# Patient Record
Sex: MALE | NOT HISPANIC OR LATINO | Employment: OTHER | URBAN - METROPOLITAN AREA
[De-identification: names, ages, dates, MRNs, and addresses within clinical notes are randomized per-mention and may not be internally consistent; named-entity substitution may affect disease eponyms.]

---

## 2017-01-02 ENCOUNTER — OFFICE VISIT (OUTPATIENT)
Dept: FAMILY MEDICINE | Facility: CLINIC | Age: 57
End: 2017-01-02
Payer: COMMERCIAL

## 2017-01-02 VITALS
HEART RATE: 56 BPM | BODY MASS INDEX: 26.68 KG/M2 | DIASTOLIC BLOOD PRESSURE: 64 MMHG | SYSTOLIC BLOOD PRESSURE: 97 MMHG | WEIGHT: 170.4 LBS

## 2017-01-02 DIAGNOSIS — M10.071 ACUTE IDIOPATHIC GOUT OF RIGHT FOOT: Primary | ICD-10-CM

## 2017-01-02 LAB — URATE SERPL-MCNC: 4.6 MG/DL (ref 3.5–7.2)

## 2017-01-02 PROCEDURE — 84550 ASSAY OF BLOOD/URIC ACID: CPT | Performed by: INTERNAL MEDICINE

## 2017-01-02 PROCEDURE — 36415 COLL VENOUS BLD VENIPUNCTURE: CPT | Performed by: INTERNAL MEDICINE

## 2017-01-02 PROCEDURE — 99000 SPECIMEN HANDLING OFFICE-LAB: CPT | Performed by: INTERNAL MEDICINE

## 2017-01-02 PROCEDURE — 99214 OFFICE O/P EST MOD 30 MIN: CPT | Performed by: INTERNAL MEDICINE

## 2017-01-02 NOTE — NURSING NOTE
"Chief Complaint   Patient presents with     Arthritis     gout in big toe on right foot. Pain comes and goes but has been getting worse, ongoing for the last couple years. Recovery of stroke (7months) and is having a hard time completing physical therapy due to toe pain.       Initial BP 97/64 mmHg  Pulse 56  Wt 170 lb 6.4 oz (77.293 kg) Estimated body mass index is 26.68 kg/(m^2) as calculated from the following:    Height as of 11/8/16: 5' 7\" (1.702 m).    Weight as of this encounter: 170 lb 6.4 oz (77.293 kg).  BP completed using cuff size: large  Ladonna Schwsamanthaek CMA    "

## 2017-01-02 NOTE — PROGRESS NOTES
SUBJECTIVE:                                                    Sen Watkins is a 56 year old male who presents to clinic today for the following health issues:    Chief Complaint   Patient presents with     Arthritis     gout in big toe on right foot. Pain comes and goes, ongoing for the last couple years.    gout: he is not currently having an episode of gout. He thinks episodes are triggered by soda use.  History of gout x 4 years.  He cannot say how many episodes of gout he has per year but reports more than 3/year.  Episodes typically last 2-3 days.  He treats at home with rest, apple cider vinegar.  Doesn't use any meds b/c he is sensitive to meds.      Current Outpatient Prescriptions   Medication Sig Dispense Refill     atorvastatin (LIPITOR) 40 MG tablet Take 1 tablet (40 mg) by mouth daily 90 tablet 1     aspirin EC 81 MG EC tablet Take 1 tablet (81 mg) by mouth daily       L-ARGININE PO        VITAMIN K PO        SPIRULINA PO        Dacia Root (DACIA PO)        Ascorbic Acid (VITAMIN C PO) Take 250 mg by mouth       Selenium (SELENIMIN-200 PO)        Multiple Vitamins-Iron (STRESS B COMPLEX/IRON) TABS Take 1 tablet by mouth daily 100 tablet 3     DiphenhydrAMINE HCl (BENADRYL PO) Take 25 mg by mouth every 6 hours as needed for itching       albuterol (PROAIR HFA, PROVENTIL HFA, VENTOLIN HFA) 108 (90 BASE) MCG/ACT inhaler Inhale 2 puffs into the lungs every 4 hours as needed for shortness of breath / dyspnea. 3 Inhaler 3     Problem list, Medication list, Allergies, and Medical/Social/Surgical histories reviewed in EPIC and updated as appropriate.    ROS:  Constitutional, HEENT, cardiovascular, pulmonary, gi and gu systems are negative, except as otherwise noted.    OBJECTIVE:                                                    BP 97/64 mmHg  Pulse 56  Wt 170 lb 6.4 oz (77.293 kg)  Body mass index is 26.68 kg/(m^2).  GENERAL APPEARANCE: healthy, alert and no distress  MS: no erythema, redness,  pain, tophi at right great toe    Diagnostic Test Results:  No results found for this or any previous visit (from the past 24 hour(s)).     ASSESSMENT/PLAN:                                                        ICD-10-CM    1. Acute idiopathic gout of right foot M10.071 Uric acid     1. We will check uric acid level today  2. If the uric acid level is > 6, then we should start Allopurinol a gout preventative medication.  3. The most common side effect of allopurinol is a flare-up of gout, as the body gets used to the medication.  We typically use Naproxen 500 mg once daily x 1 month to prevent flare-up of gout. (will not use colchicine due to cost)  4. Repeat Uric Acid level in 2 months, and see your primary care provider to increase the allopurinol, if needed.        Michelle Cardenas, DO  [facility]

## 2017-01-02 NOTE — PATIENT INSTRUCTIONS
1. We will check uric acid level today  2. If the uric acid level is > 6, then we should start Allopurinol a gout preventative medication.  3. The most common side effect of allopurinol is a flare-up of gout, as the body gets used to the medication.  We typically use Naproxen 500 mg once daily x 1 month to prevent flare-up of gout.  4. Repeat Uric Acid level in 2 months, and see your primary care provider to increase the allopurinol, if needed.

## 2017-01-02 NOTE — MR AVS SNAPSHOT
After Visit Summary   1/2/2017    Sen Watkins    MRN: [id number]           Patient Information     Date Of Birth          1960        Visit Information        Provider Department      1/2/2017 10:40 AM Michelle Cardenas, DO [facility]        Today's Diagnoses     Acute idiopathic gout of right foot    -  1       Care Instructions    1. We will check uric acid level today  2. If the uric acid level is > 6, then we should start Allopurinol a gout preventative medication.  3. The most common side effect of allopurinol is a flare-up of gout, as the body gets used to the medication.  We typically use Naproxen 500 mg once daily x 1 month to prevent flare-up of gout.  4. Repeat Uric Acid level in 2 months, and see your primary care provider to increase the allopurinol, if needed.        Follow-ups after your visit        Your next 10 appointments already scheduled     Jan 04, 2017 12:30 PM   Treatment with Nelson Garcia PT   [facility] ([facility])    [address] [place] [address] [phone]            Jan 18, 2017 12:30 PM   Treatment with Nelson Garcia, PT   [facility] ([facility])    [address] [place] [id number] [phone]              Who to contact     If you have questions or need follow up information about today's clinic visit or your schedule please contact [facility] directly at [phone].  Normal or non-critical lab and imaging results will be communicated to you by MyChart, letter or phone within 4 business days after the clinic has received the results. If you do not hear from us within 7 days, please contact the clinic through MyChart or phone. If you have a critical or abnormal lab result, we will notify you by phone as soon as possible.  Submit refill requests through [employer] or call your pharmacy and they will forward the  "refill request to us. Please allow 3 business days for your refill to be completed.          Additional Information About Your Visit        [employer] Information     StowThat lets you send messages to your doctor, view your test results, renew your prescriptions, schedule appointments and more. To sign up, go to [email or web address] . Click on \"Log in\" on the left side of the screen, which will take you to the Welcome page. Then click on \"Sign up Now\" on the right side of the page.     You will be asked to enter the access code listed below, as well as some personal information. Please follow the directions to create your username and password.     Your access code is: [email or web address]  Expires: 2017  5:30 AM     Your access code will  in 90 days. If you need help or a new code, please call your [facility] or [phone].        Your Vitals Were     Pulse                   56            Blood Pressure from Last 3 Encounters:   17 97/64   16 110/72   16 122/86    Weight from Last 3 Encounters:   17 170 lb 6.4 oz (77.293 kg)   16 163 lb (73.936 kg)   16 166 lb 12.8 oz (75.66 kg)              We Performed the Following     Uric acid        Primary Care Provider Office Phone # Fax #    MYRA Craig [facility] [phone]       [address] [address]        Thank you!     Thank you for choosing [facility]  for your care. Our goal is always to provide you with excellent care. Hearing back from our patients is one way we can continue to improve our services. Please take a few minutes to complete the written survey that you may receive in the mail after your visit with us. Thank you!             Your Updated Medication List - Protect others around you: Learn how to safely use, store and throw away your medicines at www.disposemymeds.org.          This list is accurate as of: 17 10:53 AM.  Always use " your most recent med list.                   Brand Name Dispense Instructions for use    albuterol 108 (90 BASE) MCG/ACT Inhaler    PROAIR HFA/PROVENTIL HFA/VENTOLIN HFA    3 Inhaler    Inhale 2 puffs into the lungs every 4 hours as needed for shortness of breath / dyspnea.       aspirin 81 MG EC tablet      Take 1 tablet (81 mg) by mouth daily       atorvastatin 40 MG tablet    LIPITOR    90 tablet    Take 1 tablet (40 mg) by mouth daily       BENADRYL PO      Take 25 mg by mouth every 6 hours as needed for itching       L-ARGININE PO          DACIA PO          SELENIMIN-200 PO          SPIRULINA PO          STRESS B COMPLEX/IRON Tabs     100 tablet    Take 1 tablet by mouth daily       VITAMIN C PO      Take 250 mg by mouth       VITAMIN K PO

## 2017-01-04 ENCOUNTER — TELEPHONE (OUTPATIENT)
Dept: FAMILY MEDICINE | Facility: CLINIC | Age: 57
End: 2017-01-04

## 2017-01-04 ENCOUNTER — HOSPITAL ENCOUNTER (OUTPATIENT)
Dept: PHYSICAL THERAPY | Facility: CLINIC | Age: 57
Setting detail: THERAPIES SERIES
End: 2017-01-04
Attending: PHYSICAL MEDICINE & REHABILITATION
Payer: COMMERCIAL

## 2017-01-04 DIAGNOSIS — M1A.09X0 IDIOPATHIC CHRONIC GOUT OF MULTIPLE SITES WITHOUT TOPHUS: Primary | ICD-10-CM

## 2017-01-04 DIAGNOSIS — I63.81 THALAMIC INFARCT, ACUTE (H): ICD-10-CM

## 2017-01-04 DIAGNOSIS — I63.9 OCCIPITAL STROKE (H): Primary | ICD-10-CM

## 2017-01-04 PROCEDURE — 97110 THERAPEUTIC EXERCISES: CPT | Mod: GP

## 2017-01-04 PROCEDURE — 40000718 ZZHC STATISTIC PT DEPARTMENT ORTHO VISIT

## 2017-01-04 RX ORDER — ATORVASTATIN CALCIUM 40 MG/1
40 TABLET, FILM COATED ORAL DAILY
Qty: 90 TABLET | Refills: 1 | Status: SHIPPED | OUTPATIENT
Start: 2017-01-04 | End: 2017-07-10

## 2017-01-04 NOTE — TELEPHONE ENCOUNTER
Patient states he has been researching on the internet and found that he could have low uric acid and still have gout flares. He is requesting a referral for rheumatologist and is also is requesting to know if there is a urine test that would help diagnosis gout as well (calcium build up). I tried to explain what he is describing is likely the make up of his kidney stones but he states he knows that his gout is caused by his kidney stones.     1. He needs referral for his appt on 1/17- this is pended in   2. He is requesting a standing order for a urine test to go with the uric acid order? I am not sure what this would    Evelyn Barrera RN

## 2017-01-04 NOTE — TELEPHONE ENCOUNTER
It is fine to see Dr. Dejesus if he wishes.  As we discussed at the time of visit, there is questionable utility in checking uric acid when he was just getting over an episode.  I agree that checking uric acid at another date can be helpful.  We can certainly check UA, but not sure what else this would add.    If wants to start the preventative med, he doesn't have to see Dr. Dejesus for that, I can certainly start it.  We did discuss this in the visit.

## 2017-01-04 NOTE — TELEPHONE ENCOUNTER
Reason for Call: Request for an order or referral:    Order or referral being requested: referral    Date needed: as soon as possible    Has the patient been seen by the PCP for this problem? YES    Additional comments: pt calling in stating he will be seeing Dr Dejesus in [place] in two weeks. He needs a referral. He was also wondering if there is another flare up if there is a urine test he can get done.    Phone number Patient can be reached at:  Home number on file [phone] (home)    Best Time:  any    Can we leave a detailed message on this number?  YES    Call taken on 1/4/2017 at 2:27 PM by Gayathri Landers

## 2017-01-04 NOTE — TELEPHONE ENCOUNTER
Atorvastatin     Last Written Prescription Date: 07/8/16  Last Fill Quantity: 90, # refills: 1  Last Office Visit with [facility], [facility] or [facility] prescribing provider: 1/2/17       CHOL      178   6/3/2016  HDL       64   6/3/2016  LDL      103   6/3/2016  TRIG       54   6/3/2016  CHOLHDLRATIO      3.0   4/25/2011

## 2017-01-05 RX ORDER — NAPROXEN 500 MG/1
500 TABLET ORAL DAILY
Qty: 30 TABLET | Refills: 0 | Status: SHIPPED | OUTPATIENT
Start: 2017-01-05 | End: 2017-02-06

## 2017-01-05 RX ORDER — ALLOPURINOL 100 MG/1
100 TABLET ORAL DAILY
Qty: 90 TABLET | Refills: 1 | Status: SHIPPED | OUTPATIENT
Start: 2017-01-05 | End: 2017-07-10

## 2017-01-05 NOTE — TELEPHONE ENCOUNTER
"S; see note below was seen recently and requested referral to rheum.     B:  I reached him and  Advised per Dr Cardenas's note below:    \"well, they told me that they wouldn't do the preventative medicine when they called me about the labs\"   (see result notes below)     Notes Recorded by Sierra Judd CMA on 1/3/2017 at 3:40 PM  Spoke with patient regarding results with provider's instructions.  ------  Notes Recorded by Michelle Cardenas DO on 1/2/2017 at 3:27 PM  Uric acid is very low, would not recommend starting Allopurinol at this time.  It could be reconsidered if he continues to have frequent flare up of gout.  He should be seen at the time of next episode of gout, to ensure it truly is gout and not something else.    \"I still have pain in my foot, and a lump, it is like a crystallization buildup on my foot. \"  Has had foot pain for a couple of years. \"I didn't have insurance to be seen, I had an xray in [facility] and they saw gout then, \"  \"I couldn't afford to take off work, and I still can't afford all this. \"  \"If she will order the prevention medicine I wouldn't need to see Dr Dejesus, but they told me when they called she wouldn't order it, \" (as per result notes on uric acid )     A; believes he has gout even though the uric was low, and is interested in preventative medication  R: note to Dr Cardenas, what to recommend?   Rowan Toney RNC      "

## 2017-01-05 NOTE — TELEPHONE ENCOUNTER
Ok to start allopurinol 100 mg once daily.  Start naproxen for gout flare prevention, as allopurinol increase risk of gout within first few weeks of starting.

## 2017-01-17 ENCOUNTER — OFFICE VISIT (OUTPATIENT)
Dept: RHEUMATOLOGY | Facility: CLINIC | Age: 57
End: 2017-01-17
Payer: COMMERCIAL

## 2017-01-17 VITALS
DIASTOLIC BLOOD PRESSURE: 76 MMHG | SYSTOLIC BLOOD PRESSURE: 117 MMHG | WEIGHT: 175 LBS | HEART RATE: 55 BPM | BODY MASS INDEX: 27.4 KG/M2

## 2017-01-17 DIAGNOSIS — M19.91 PRIMARY OSTEOARTHRITIS, UNSPECIFIED SITE: Primary | ICD-10-CM

## 2017-01-17 PROCEDURE — 99202 OFFICE O/P NEW SF 15 MIN: CPT | Performed by: INTERNAL MEDICINE

## 2017-01-17 NOTE — MR AVS SNAPSHOT
"              After Visit Summary   1/17/2017    Sen Watkins    MRN: [id number]           Patient Information     Date Of Birth          1960        Visit Information        Provider Department      1/17/2017 1:45 PM Nakul Dejesus MD [facility]        Today's Diagnoses     Primary osteoarthritis, unspecified site    -  1        Follow-ups after your visit        Your next 10 appointments already scheduled     Jan 18, 2017 12:30 PM   Treatment with Nelson Garcia PT   [facility] ([facility])    [address] [place] [id number] [phone]              Who to contact     If you have questions or need follow up information about today's clinic visit or your schedule please contact [facility] directly at [phone].  Normal or non-critical lab and imaging results will be communicated to you by MyChart, letter or phone within 4 business days after the clinic has received the results. If you do not hear from us within 7 days, please contact the clinic through MyChart or phone. If you have a critical or abnormal lab result, we will notify you by phone as soon as possible.  Submit refill requests through [employer] or call your pharmacy and they will forward the refill request to us. Please allow 3 business days for your refill to be completed.          Additional Information About Your Visit        MyChart Information     [employer] lets you send messages to your doctor, view your test results, renew your prescriptions, schedule appointments and more. To sign up, go to [email or web address] . Click on \"Log in\" on the left side of the screen, which will take you to the Welcome page. Then click on \"Sign up Now\" on the right side of the page.     You will be asked to enter the access code listed below, as well as some personal information. Please follow the directions to create your username and " password.     Your access code is: [email or web address]  Expires: 2017  5:30 AM     Your access code will  in 90 days. If you need help or a new code, please call your [place] clinic or [phone].        Care EveryWhere ID     This is your Care EveryWhere ID. This could be used by other organizations to access your [place] medical records  [id number]        Your Vitals Were     Pulse                   55            Blood Pressure from Last 3 Encounters:   17 117/76   17 97/64   16 110/72    Weight from Last 3 Encounters:   17 79.379 kg (175 lb)   17 77.293 kg (170 lb 6.4 oz)   16 73.936 kg (163 lb)              Today, you had the following     No orders found for display       Primary Care Provider Office Phone # Fax #    MYRA Craig [facility] [phone]       [place] [address] [place] [id number]        Thank you!     Thank you for choosing [facility]  for your care. Our goal is always to provide you with excellent care. Hearing back from our patients is one way we can continue to improve our services. Please take a few minutes to complete the written survey that you may receive in the mail after your visit with us. Thank you!             Your Updated Medication List - Protect others around you: Learn how to safely use, store and throw away your medicines at www.disposemymeds.org.          This list is accurate as of: 17  3:12 PM.  Always use your most recent med list.                   Brand Name Dispense Instructions for use    albuterol 108 (90 BASE) MCG/ACT Inhaler    PROAIR HFA/PROVENTIL HFA/VENTOLIN HFA    3 Inhaler    Inhale 2 puffs into the lungs every 4 hours as needed for shortness of breath / dyspnea.       allopurinol 100 MG tablet    ZYLOPRIM    90 tablet    Take 1 tablet (100 mg) by mouth daily       aspirin 81 MG EC tablet      Take 1 tablet (81 mg) by mouth daily       atorvastatin 40  MG tablet    LIPITOR    90 tablet    Take 1 tablet (40 mg) by mouth daily       BENADRYL PO      Take 25 mg by mouth every 6 hours as needed for itching       L-ARGININE PO          DACIA PO          naproxen 500 MG tablet    NAPROSYN    30 tablet    Take 1 tablet (500 mg) by mouth daily       SELENIMIN-200 PO          SPIRULINA PO          STRESS B COMPLEX/IRON Tabs     100 tablet    Take 1 tablet by mouth daily       VITAMIN C PO      Take 250 mg by mouth       VITAMIN K PO

## 2017-01-17 NOTE — PROGRESS NOTES
"Chief Complaint   Patient presents with     Consult     ref. Dr. Cardenas    Gout ?      Initial /76 mmHg  Pulse 55  Wt 79.379 kg (175 lb) Estimated body mass index is 27.4 kg/(m^2) as calculated from the following:    Height as of 11/8/16: 1.702 m (5' 7\").    Weight as of this encounter: 79.379 kg (175 lb).      Patient prefers to be contacted via at Home.   Okay to leave detailed message: Yes  Patient uses MyChart: No    Chelle RAMÍREZ LPN      "

## 2017-01-18 ENCOUNTER — HOSPITAL ENCOUNTER (OUTPATIENT)
Dept: PHYSICAL THERAPY | Facility: CLINIC | Age: 57
Setting detail: THERAPIES SERIES
End: 2017-01-18
Attending: PHYSICAL MEDICINE & REHABILITATION
Payer: COMMERCIAL

## 2017-01-18 PROCEDURE — 97110 THERAPEUTIC EXERCISES: CPT | Mod: GP

## 2017-01-18 PROCEDURE — 40000719 ZZHC STATISTIC PT DEPARTMENT NEURO VISIT

## 2017-01-18 NOTE — PROGRESS NOTES
CHIEF COMPLAINT:  Gout.      HISTORY OF PRESENT ILLNESS:  Sen Watkins is a 56-year-old male who presents for evaluation of what essentially is gout.  I am not exactly certain where the diagnosis first came from, the patient is fairly convinced that he has gout.  He is complaining of pain and swelling in the right first MTP joint that is really worse more with prolonged standing or when he attempts to roller skate and such activities.  He says that at times he can become quite swollen but does not appear the pain ever resolves completely.  Most importantly, he recently was given some naproxen which does seem to have helped significantly and uric acid level was checked and was found to be at 4.6.  The patient has had kidney stones, is not clear that they were ever actually urate related.        The patient has no other significant joint pain, swelling, or stiffness.  He does not have a rash or psoriasis.  He does not have a diagnosis of inflammatory bowel disease or chronic diarrhea.  He also does not have problems on his left foot.      PAST MEDICAL HISTORY:  Acute ischemic stroke, intermittent asthma.      MEDICATIONS:   1.  Lipitor 40 mg daily.   2.  Albuterol p.r.n.   3.  Aspirin 81 mg daily.   4.  Naproxen 500 mg daily.   5.  Multivitamin.      DRUG ALLERGIES:  Codeine.      SOCIAL HISTORY:  Former smoker, quit in 2012, does not drink.      FAMILY HISTORY:  Nobody else in the family with gout.      REVIEW OF SYSTEMS:  As noted above.      PHYSICAL EXAMINATION:   VITAL SIGNS:  Blood pressure 117/76, pulse 76, pulse 55, weight 175.     HEENT:  Head is normocephalic, atraumatic.  Sclerae are clear and moist.  Oropharynx without lesions.   NECK:  Supple, without lymphadenopathy.   MUSCULOSKELETAL:  Joint exam there is no obvious tophi in the upper and lower extremities.  There is no synovitis of the wrists, MCPs, or PIPs.  There is no synovitis of the elbows, shoulders, knees or ankles.  What he has at the right  first MTP joint is a bunion and he is developing hallux rigidus, this is not a tophi as far as I can tell, nor does he appear to have anything that would suggest he has active gout.      IMPRESSION:  Osteoarthritis of the right first MTP joint.      RECOMMENDATIONS:     1.  At this point, I discussed that really what I am seeing is osteoarthritis, is not convincing to me that this is gout, especially given his low uric acid level, although if it was drawn at the time of an attack it could be artificially suppressed.  Discussed treatment for osteoarthritis would be wearing inserts, perhaps getting an injection, now and then surgeries, last resort through the use of NSAIDs.  If the Naprosyn is helping I will continue.  I cautioned this is probably going to hurt the more he uses it and with prolonged standing, running, roller blading, etc.   2.  At this point, I would not treat him for gout as I do not see anything that suggests that he actually has gout, if he does have an acute attack that is characteristic of podagra he needs to let me know so I can see him at that point and attempt an aspiration for micro crystal analysis.         ABEL BARKSDALE MD             D: 2017 15:42   T: 2017 06:34   MT: ELIECER#[id number]      Name:     BRONWYN OSORIO   MRN:      [id number]        Account:      [id number]   :      1960           Visit Date:   2017      Document: [id number]

## 2017-01-18 NOTE — DISCHARGE SUMMARY
Outpatient Physical Therapy Discharge Note     Patient: Sen Watkins  : 1960    Beginning/End Dates of Reporting Period:  8/10/16 to 2017    Referring Provider: Magda Goldberg MD      Therapy Diagnosis: Fall risk, gait imairment        Client Self Report: Urologist appt went very well, diet has really helped. Still has R arm pain with stress. Able to rest more. Pt states improvement with skilled PT at 75%. Pt will join [facility] in one month.     Objective Measurements:  Objective Measure: Coordination  Details: Heel/shin test: WNL  Objective Measure: ABC  Details: 80.1% (67.3% at last progress note)  Objective Measure: Hip MMT   Details: IR: R=5, L=5, ER: R=45, L=45; abd: R=4+, L=4+  Objective Measure: TUG  Details: Classic=9.2sec, 9.2 sec  Objective Measure: FGA  Details:  (at last PN )            Outcome Measures (most recent score):  See ABC above    Goals:  Goal Identifier Balance   Goal Description Following PT interventions, pt will perform Functional TUG in 13 sec to be able to decrease fall risk with walking balance   Target Date 16   Date Met  08/15/16   Progress:     Goal Identifier transfers   Goal Description Following PT interventions, pt will increase speed with less pain on RLE heel/shin test to be able to car transfer with less difficulty   Target Date 16   Date Met  17   Progress:     Goal Identifier walk   Goal Description Following PT interventions, pt will increase R hip IR/abd MMT to grade 4/5 to be able to independently progress HEP with walking >4 blocks   Target Date 16   Date Met  16   Progress:     Goal Identifier truck drive   Goal Description Following PT interventions, 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 Pt has met all goals, increased strength, improved balance, decreased fall risk, improved overall body function s/p stoke to occipital lobe and thalamus 6 months prior. Pt is now ready for independently progress HEP and initiate personal strength training goals at local gym. Pt is ready for DC to HEP at this time    Plan:  Discharge from therapy.    Discharge:    Reason for Discharge: Patient has met all goals.    Equipment Issued: T-Bamds    Discharge Plan: Patient to continue home program.

## 2017-02-02 ENCOUNTER — OFFICE VISIT (OUTPATIENT)
Dept: FAMILY MEDICINE | Facility: CLINIC | Age: 57
End: 2017-02-02
Payer: COMMERCIAL

## 2017-02-02 VITALS
OXYGEN SATURATION: 99 % | WEIGHT: 173.2 LBS | BODY MASS INDEX: 27.12 KG/M2 | DIASTOLIC BLOOD PRESSURE: 62 MMHG | TEMPERATURE: 97.3 F | HEART RATE: 62 BPM | SYSTOLIC BLOOD PRESSURE: 122 MMHG

## 2017-02-02 DIAGNOSIS — R73.9 ELEVATED BLOOD SUGAR: ICD-10-CM

## 2017-02-02 DIAGNOSIS — M19.071 PRIMARY OSTEOARTHRITIS OF RIGHT FOOT: ICD-10-CM

## 2017-02-02 DIAGNOSIS — I63.81 THALAMIC INFARCT, ACUTE (H): ICD-10-CM

## 2017-02-02 DIAGNOSIS — I63.9 OCCIPITAL STROKE (H): Primary | ICD-10-CM

## 2017-02-02 LAB
ALBUMIN SERPL-MCNC: 3.8 G/DL (ref 3.4–5)
ALP SERPL-CCNC: 133 U/L (ref 40–150)
ALT SERPL W P-5'-P-CCNC: 58 U/L (ref 0–70)
ANION GAP SERPL CALCULATED.3IONS-SCNC: 6 MMOL/L (ref 3–14)
AST SERPL W P-5'-P-CCNC: 38 U/L (ref 0–45)
BILIRUB SERPL-MCNC: 0.4 MG/DL (ref 0.2–1.3)
BUN SERPL-MCNC: 24 MG/DL (ref 7–30)
CALCIUM SERPL-MCNC: 8.6 MG/DL (ref 8.5–10.1)
CHLORIDE SERPL-SCNC: 110 MMOL/L (ref 94–109)
CHOLEST SERPL-MCNC: 129 MG/DL
CO2 SERPL-SCNC: 27 MMOL/L (ref 20–32)
CREAT SERPL-MCNC: 0.91 MG/DL (ref 0.66–1.25)
ERYTHROCYTE [DISTWIDTH] IN BLOOD BY AUTOMATED COUNT: 14.4 % (ref 10–15)
GFR SERPL CREATININE-BSD FRML MDRD: 86 ML/MIN/1.7M2
GLUCOSE SERPL-MCNC: 95 MG/DL (ref 70–99)
HBA1C MFR BLD: 5.7 % (ref 4.3–6)
HCT VFR BLD AUTO: 43.7 % (ref 40–53)
HDLC SERPL-MCNC: 67 MG/DL
HGB BLD-MCNC: 15 G/DL (ref 13.3–17.7)
LDLC SERPL CALC-MCNC: 38 MG/DL
MCH RBC QN AUTO: 30.9 PG (ref 26.5–33)
MCHC RBC AUTO-ENTMCNC: 34.3 G/DL (ref 31.5–36.5)
MCV RBC AUTO: 90 FL (ref 78–100)
NONHDLC SERPL-MCNC: 62 MG/DL
PLATELET # BLD AUTO: 258 10E9/L (ref 150–450)
POTASSIUM SERPL-SCNC: 4.6 MMOL/L (ref 3.4–5.3)
PROT SERPL-MCNC: 7.4 G/DL (ref 6.8–8.8)
RBC # BLD AUTO: 4.85 10E12/L (ref 4.4–5.9)
SODIUM SERPL-SCNC: 143 MMOL/L (ref 133–144)
TRIGL SERPL-MCNC: 118 MG/DL
WBC # BLD AUTO: 9.4 10E9/L (ref 4–11)

## 2017-02-02 PROCEDURE — 85027 COMPLETE CBC AUTOMATED: CPT | Performed by: NURSE PRACTITIONER

## 2017-02-02 PROCEDURE — 80053 COMPREHEN METABOLIC PANEL: CPT | Performed by: NURSE PRACTITIONER

## 2017-02-02 PROCEDURE — 36415 COLL VENOUS BLD VENIPUNCTURE: CPT | Performed by: NURSE PRACTITIONER

## 2017-02-02 PROCEDURE — 99214 OFFICE O/P EST MOD 30 MIN: CPT | Performed by: NURSE PRACTITIONER

## 2017-02-02 PROCEDURE — 83036 HEMOGLOBIN GLYCOSYLATED A1C: CPT | Performed by: NURSE PRACTITIONER

## 2017-02-02 PROCEDURE — 80061 LIPID PANEL: CPT | Performed by: NURSE PRACTITIONER

## 2017-02-02 NOTE — NURSING NOTE
"Initial /62 mmHg  Pulse 62  Temp(Src) 97.3  F (36.3  C) (Tympanic)  Wt 173 lb 3.2 oz (78.563 kg)  SpO2 99% Estimated body mass index is 27.12 kg/(m^2) as calculated from the following:    Height as of 11/8/16: 5' 7\" (1.702 m).    Weight as of this encounter: 173 lb 3.2 oz (78.563 kg). .      "

## 2017-02-02 NOTE — PATIENT INSTRUCTIONS
Understanding Asthma  Asthma causes swelling and narrowing of the airways in your lungs. No one is exactly sure what causes asthma. It is believed to be a combination of inherited and environmental factors.  Healthy lungs  Inside the lungs there are branching airways made of stretchy tissue. Each airway is wrapped with bands of muscle. The airways are more narrow as they go deeper into the lungs. The smallest airways end in clusters of tiny balloon-like air sacs (alveoli). These clusters are surrounded by blood vessels. When you inhale (breathe in), air enters the lungs. It travels down through the airways until it reaches the air sacs. When you exhale (breathe out), air travels up through the airways and out of the lungs. The airways produce mucus that traps particles you breathe in. Normally, the mucus is then swept out of the lungs, by tiny hairs (cilia) that line the airways, to be swallowed or coughed up.  What the lungs do  The air you inhale contains oxygen. When oxygen reaches the air sacs, it passes into the blood vessels surrounding the sacs. Your blood then delivers oxygen to all of your cells. Carbon dioxide is removed from the body in a similar way, as you exhale.  When you have asthma       Tightened muscle    Swollen lining    Increased mucus   People with asthma have very sensitive airways. This means the airways react to certain things called triggers (such as pollen, dust, or smoke) and become swollen and narrowed. Inflammation makes the airways swollen and narrowed. This is a chronic (long-lasting or recurring) problem. The airways may not always be narrowed enough to notice breathing problems.  Symptoms of chronic inflammation:     Coughing    Chest tightness    Shortness of breath    Wheezing (a whistling noise, especially when breathing out)    Low energy or feeling tired  Over time, chronic mild inflammation can lead to permanent scarring of airways and loss of lung function.  Moderate  flare-ups  When sensitive airways are irritated by a trigger, the muscles around the airways tighten. The lining of the airways swells. Thick, sticky mucus increases and clogs the airways. All of this makes you work harder to keep breathing.  Symptoms of moderate flare-ups:    Coughing, especially at night    Getting tired or out of breath easily    Wheezing    Chest tightness    Faster breathing when at rest  Severe flare-ups   Severe flare-ups are life-threatening. They can cause brain damage or death. In a severe flare-up, the muscle tightening, swelling, and mucus production are even worse. Breathing is extremely difficult. Your body can't get enough oxygen and can't remove carbon dioxide. Waste gas is trapped in the alveoli, and gas exchange can t occur. The body is not getting enough oxygen. Without oxygen, body tissues, especially brain tissue, begin to die. If this goes on for long, it can lead to brain damage or death.  Call 911, or have someone call for you, if you have any of these symptoms and they are not relieved right away by taking your quick-relief medication as prescribed:    Severe difficulty breathing    Being too short of breath to talk or walk    Lips or fingers turning blue    Feeling lightheaded or dizzy, as though you are about to pass out    Peak flow less than 50% of your personal best, if you use peak flow monitoring  Because asthma is a long-term condition, it is important to work with your health care provider to manage it. If you smoke, get help to quit. Know your triggers and figure out how to avoid them. And, it is very important that you take your medications as instructed. That means taking them, even when you feel good.    1366-1730 [employer]. [address] [place] [address], [place] [address]. All rights reserved. This information is not intended as a substitute for professional medical care. Always follow your healthcare professional's instructions.      Atrovent HFA  Inhaler  Medicine: Ipratropium ([id number])  What it does  The medicine in this inhaler relaxes the muscles around the airway and make breathing easier.  Use every day to prevent symptoms, even if you are feeling well. It can be used with a fast-acting inhaler for quick relief. Do not use alone for fast relief.     Test spray (priming)  Spray away from face 2 times if it is new or you have not used it for 3 days. Do not spray into eyes.  How to use this inhaler    Wash and dry your hands well.    Remove the mouthpiece cover. Make sure there aren't any loose parts inside the mouthpiece.    Sit up straight or stand up.    Hold the inhaler so the mouthpiece is at the bottom and the canister is sticking straight up. Fully exhale (breathe out) through your mouth.    Place the mouthpiece between your lips. Make sure that your tongue is flat under the mouthpiece and does not block the opening.    Seal your lips around the mouthpiece.    Close your eyes (to make sure you do not spray medicine into your eyes).    Push the canister all the way down into the plastic sleeve as you slowly take a deep breath through your mouth for 5 seconds.    Hold your breath for 10 seconds. If you cannot hold your breath for 10 seconds, then hold your breath for as long as you can.    Wait about a minute and repeat steps 5 to 10.    Replace the mouthpiece cover after each use. Store in a cool, dry place.  Cleaning  Each week remove the canister and wash the plastic sleeve in warm, running water for 30 seconds. Let air dry.  How to tell if the inhaler is empty  Each inhaler contains 200 puffs. The counter is on the back of the inhaler. At count 40, the color starts to change from green to red. At 20, it will be totally red to remind you to refill. It is empty at 0 (zero).  If you have questions about the use of your inhaler, please ask your pharmacist or provider.  For more information and video demos, go to [email or web address][email or web address]/inhaler.  For  informational purposes only. Not to replace the advice of your health care provider.  Copyright   2013 [facility]. All rights reserved. [employer] [id number] - REV 03/16.    Osteoarthritis: Common Sites  Osteoarthritis (OA) is sometimes called degenerative joint disease or wear-and-tear arthritis. It's the most common type of arthritis. In OA, the cartilage wears away. Cartilage covers the ends of bones and acts as a cushion. If enough cartilage wears away, bone rubs against bone. The joint changes in OA cause pain, stiffness, and trouble moving. OA may occur in any joint. Some joint that are commonly affected are the spin, neck,  hips, knees, fingers and toes.     Neck  Joints between small bones in the neck may wear out. Pain may travel to the shoulder or the base of the skull.  Lumbar Spine  Bony spurs may form on the joints between the vertebrae (spinal bones). And disks (cushions of cartilage between vertebrae) may wear down. Pain may affect the lower back or leg.  Hip  Cartilage damage can occur in the large  ball and socket  joint that connects the pelvis and thighbone. Pain may travel to the groin, buttocks, or knee.  Knee  The cartilage in the knee joint may wear down. Weakness or instability in the knee joint may make walking or climbing stairs difficult.  Fingers  Finger joints may become enlarged and knobby. Grasping objects may be hard, especially if the joint at the base of the thumb is affected.  Toes  Toes may be affected. Arthritis may cause a bunion, a bump at the base of the big toe. Standing or walking may be painful.    9486-6054 [employer]. [address] [place] [address]. All rights reserved. This information is not intended as a substitute for professional medical care. Always follow your healthcare professional's instructions.        Osteoarthritis: Coping with Pain  There are many ways to control your pain. You re making a good start by learning about  osteoarthritis and its treatments. Knowing more about this condition helps you work with your health care provider to find answers to problems. Keeping a positive outlook can help you manage pain from day to day. And making time each day to relax and enjoy yourself may help you control osteoarthritis pain, instead of letting it control you. Try these methods to help you cope with, and even reduce, your pain.  Take control  Relaxing may help relieve muscle aches that result from joint pain. To relax, try these techniques:    Breathe slowly and calmly and think of a peaceful scene.    Meditate by focusing your mind on one word, object, or idea.  Getting plenty of sleep can help reduce pain and let you function better. If pain is making it hard for you to sleep, ask your doctor about ways to control pain and ensure a good night s sleep. Cutting back on caffeine and alcohol can help you sleep better. So can going to bed and getting up at about the same time every day.  Use distraction  Getting your mind off the pain may seem hard to do. But it can actually help reduce pain. When you are in pain, try one of these ways of distracting yourself:    Watch a funny movie with a friend.    Listen to music you enjoy.    Read a novel.    Talk with friends or family.    Go to a museum, park, or other favorite attraction.    Arrange to do a regular activity, such as volunteer work.    0869-1061 [employer]. [address] [place] [address]. All rights reserved. This information is not intended as a substitute for professional medical care. Always follow your healthcare professional's instructions.        Stroke--Self-Care  Performing your routine tasks may be difficult after you ve had a stroke (brain attack). But many people can learn ways to manage their daily activities. In fact, daily activities may help you to regain muscle strength and bring back function to affected limbs.  Bathing and dressing  By  "learning a few new ways of doing things, most people who have had a stroke can bathe and dress themselves. You may want to try the following:    Test water temperature with a hand or foot that was not affected by the stroke.    Use grab bars, a shower seat, a hand-held shower, and a long-handled brush.    Dress while sitting, starting with the affected side or limb.    Put on shirts that pull over the head, and pants or skirts with elastic waistbands.    Use zippers with loops attached to them.    Visit the hair salon weekly, or change to a \"wash and wear\" hairstyle to avoid using blow dryers and curling irons.    Use electric lindsey instead of razors to avoid injuries.    Review grooming with your occupational therapist.  Managing bladder and bowel problems  After your stroke, you may not be able to control your bladder and bowels. Nurses will work closely with you to set up a new routine.    You may be taken to the toilet on a schedule -- perhaps every 2 hours to 3 hours. Making a bathroom stop before going out may also work well.    A time may be set to empty the bowel. This will help train your bladder and bowels to go at specific intervals.    Absorbent briefs or a condom catheter (a small bag that fits over the penis) may be used.    You can use adult diapers if you need to.    Drink fluids (especially caffeine and alcohol) in the daytime and limit them in the evening to avoid having to use the bathroom at night.    7250-9228 [employer]. [address] [place] [address], [place] [address]. All rights reserved. This information is not intended as a substitute for professional medical care. Always follow your healthcare professional's instructions.        "

## 2017-02-02 NOTE — MR AVS SNAPSHOT
After Visit Summary   2/2/2017    Sen Watkins    MRN: [id number]           Patient Information     Date Of Birth          1960        Visit Information        Provider Department      2/2/2017 2:40 PM Maria Dolores Krishnamurthy APRN [facility]        Today's Diagnoses     Occipital stroke (H)    -  1     Thalamic infarct, acute (H)         Primary osteoarthritis of right foot         Elevated blood sugar           Care Instructions      Understanding Asthma  Asthma causes swelling and narrowing of the airways in your lungs. No one is exactly sure what causes asthma. It is believed to be a combination of inherited and environmental factors.  Healthy lungs  Inside the lungs there are branching airways made of stretchy tissue. Each airway is wrapped with bands of muscle. The airways are more narrow as they go deeper into the lungs. The smallest airways end in clusters of tiny balloon-like air sacs (alveoli). These clusters are surrounded by blood vessels. When you inhale (breathe in), air enters the lungs. It travels down through the airways until it reaches the air sacs. When you exhale (breathe out), air travels up through the airways and out of the lungs. The airways produce mucus that traps particles you breathe in. Normally, the mucus is then swept out of the lungs, by tiny hairs (cilia) that line the airways, to be swallowed or coughed up.  What the lungs do  The air you inhale contains oxygen. When oxygen reaches the air sacs, it passes into the blood vessels surrounding the sacs. Your blood then delivers oxygen to all of your cells. Carbon dioxide is removed from the body in a similar way, as you exhale.  When you have asthma       Tightened muscle    Swollen lining    Increased mucus   People with asthma have very sensitive airways. This means the airways react to certain things called triggers (such as pollen, dust, or smoke) and become swollen and narrowed.  Inflammation makes the airways swollen and narrowed. This is a chronic (long-lasting or recurring) problem. The airways may not always be narrowed enough to notice breathing problems.  Symptoms of chronic inflammation:     Coughing    Chest tightness    Shortness of breath    Wheezing (a whistling noise, especially when breathing out)    Low energy or feeling tired  Over time, chronic mild inflammation can lead to permanent scarring of airways and loss of lung function.  Moderate flare-ups  When sensitive airways are irritated by a trigger, the muscles around the airways tighten. The lining of the airways swells. Thick, sticky mucus increases and clogs the airways. All of this makes you work harder to keep breathing.  Symptoms of moderate flare-ups:    Coughing, especially at night    Getting tired or out of breath easily    Wheezing    Chest tightness    Faster breathing when at rest  Severe flare-ups   Severe flare-ups are life-threatening. They can cause brain damage or death. In a severe flare-up, the muscle tightening, swelling, and mucus production are even worse. Breathing is extremely difficult. Your body can't get enough oxygen and can't remove carbon dioxide. Waste gas is trapped in the alveoli, and gas exchange can t occur. The body is not getting enough oxygen. Without oxygen, body tissues, especially brain tissue, begin to die. If this goes on for long, it can lead to brain damage or death.  Call 911, or have someone call for you, if you have any of these symptoms and they are not relieved right away by taking your quick-relief medication as prescribed:    Severe difficulty breathing    Being too short of breath to talk or walk    Lips or fingers turning blue    Feeling lightheaded or dizzy, as though you are about to pass out    Peak flow less than 50% of your personal best, if you use peak flow monitoring  Because asthma is a long-term condition, it is important to work with your health care provider  to manage it. If you smoke, get help to quit. Know your triggers and figure out how to avoid them. And, it is very important that you take your medications as instructed. That means taking them, even when you feel good.    2552-9651 [employer]. [address] [place] [address]. All rights reserved. This information is not intended as a substitute for professional medical care. Always follow your healthcare professional's instructions.      Atrovent HFA Inhaler  Medicine: Ipratropium ([id number])  What it does  The medicine in this inhaler relaxes the muscles around the airway and make breathing easier.  Use every day to prevent symptoms, even if you are feeling well. It can be used with a fast-acting inhaler for quick relief. Do not use alone for fast relief.     Test spray (priming)  Spray away from face 2 times if it is new or you have not used it for 3 days. Do not spray into eyes.  How to use this inhaler    Wash and dry your hands well.    Remove the mouthpiece cover. Make sure there aren't any loose parts inside the mouthpiece.    Sit up straight or stand up.    Hold the inhaler so the mouthpiece is at the bottom and the canister is sticking straight up. Fully exhale (breathe out) through your mouth.    Place the mouthpiece between your lips. Make sure that your tongue is flat under the mouthpiece and does not block the opening.    Seal your lips around the mouthpiece.    Close your eyes (to make sure you do not spray medicine into your eyes).    Push the canister all the way down into the plastic sleeve as you slowly take a deep breath through your mouth for 5 seconds.    Hold your breath for 10 seconds. If you cannot hold your breath for 10 seconds, then hold your breath for as long as you can.    Wait about a minute and repeat steps 5 to 10.    Replace the mouthpiece cover after each use. Store in a cool, dry place.  Cleaning  Each week remove the canister and wash the plastic  sleeve in warm, running water for 30 seconds. Let air dry.  How to tell if the inhaler is empty  Each inhaler contains 200 puffs. The counter is on the back of the inhaler. At count 40, the color starts to change from green to red. At 20, it will be totally red to remind you to refill. It is empty at 0 (zero).  If you have questions about the use of your inhaler, please ask your pharmacist or provider.  For more information and video demos, go to [employer]/inhaler.  For informational purposes only. Not to replace the advice of your health care provider.  Copyright   2013 [employer]. All rights reserved. [employer] [id number] - REV 03/16.    Osteoarthritis: Common Sites  Osteoarthritis (OA) is sometimes called degenerative joint disease or wear-and-tear arthritis. It's the most common type of arthritis. In OA, the cartilage wears away. Cartilage covers the ends of bones and acts as a cushion. If enough cartilage wears away, bone rubs against bone. The joint changes in OA cause pain, stiffness, and trouble moving. OA may occur in any joint. Some joint that are commonly affected are the spin, neck,  hips, knees, fingers and toes.     Neck  Joints between small bones in the neck may wear out. Pain may travel to the shoulder or the base of the skull.  Lumbar Spine  Bony spurs may form on the joints between the vertebrae (spinal bones). And disks (cushions of cartilage between vertebrae) may wear down. Pain may affect the lower back or leg.  Hip  Cartilage damage can occur in the large  ball and socket  joint that connects the pelvis and thighbone. Pain may travel to the groin, buttocks, or knee.  Knee  The cartilage in the knee joint may wear down. Weakness or instability in the knee joint may make walking or climbing stairs difficult.  Fingers  Finger joints may become enlarged and knobby. Grasping objects may be hard, especially if the joint at the base of the thumb is affected.  Toes  Toes may be  affected. Arthritis may cause a bunion, a bump at the base of the big toe. Standing or walking may be painful.    8724-2978 [employer]. [address] [place] [address], [place] [address]. All rights reserved. This information is not intended as a substitute for professional medical care. Always follow your healthcare professional's instructions.        Osteoarthritis: Coping with Pain  There are many ways to control your pain. You re making a good start by learning about osteoarthritis and its treatments. Knowing more about this condition helps you work with your health care provider to find answers to problems. Keeping a positive outlook can help you manage pain from day to day. And making time each day to relax and enjoy yourself may help you control osteoarthritis pain, instead of letting it control you. Try these methods to help you cope with, and even reduce, your pain.  Take control  Relaxing may help relieve muscle aches that result from joint pain. To relax, try these techniques:    Breathe slowly and calmly and think of a peaceful scene.    Meditate by focusing your mind on one word, object, or idea.  Getting plenty of sleep can help reduce pain and let you function better. If pain is making it hard for you to sleep, ask your doctor about ways to control pain and ensure a good night s sleep. Cutting back on caffeine and alcohol can help you sleep better. So can going to bed and getting up at about the same time every day.  Use distraction  Getting your mind off the pain may seem hard to do. But it can actually help reduce pain. When you are in pain, try one of these ways of distracting yourself:    Watch a funny movie with a friend.    Listen to music you enjoy.    Read a novel.    Talk with friends or family.    Go to a museum, park, or other favorite attraction.    Arrange to do a regular activity, such as volunteer work.    4908-4101 [employer]. [address] [place] [address], [place], [place]  "[address]. All rights reserved. This information is not intended as a substitute for professional medical care. Always follow your healthcare professional's instructions.        Stroke--Self-Care  Performing your routine tasks may be difficult after you ve had a stroke (brain attack). But many people can learn ways to manage their daily activities. In fact, daily activities may help you to regain muscle strength and bring back function to affected limbs.  Bathing and dressing  By learning a few new ways of doing things, most people who have had a stroke can bathe and dress themselves. You may want to try the following:    Test water temperature with a hand or foot that was not affected by the stroke.    Use grab bars, a shower seat, a hand-held shower, and a long-handled brush.    Dress while sitting, starting with the affected side or limb.    Put on shirts that pull over the head, and pants or skirts with elastic waistbands.    Use zippers with loops attached to them.    Visit the hair salon weekly, or change to a \"wash and wear\" hairstyle to avoid using blow dryers and curling irons.    Use electric lindsey instead of razors to avoid injuries.    Review grooming with your occupational therapist.  Managing bladder and bowel problems  After your stroke, you may not be able to control your bladder and bowels. Nurses will work closely with you to set up a new routine.    You may be taken to the toilet on a schedule -- perhaps every 2 hours to 3 hours. Making a bathroom stop before going out may also work well.    A time may be set to empty the bowel. This will help train your bladder and bowels to go at specific intervals.    Absorbent briefs or a condom catheter (a small bag that fits over the penis) may be used.    You can use adult diapers if you need to.    Drink fluids (especially caffeine and alcohol) in the daytime and limit them in the evening to avoid having to use the bathroom at night.    1582-6960 The " "[employer]. [address] [place] [address]. All rights reserved. This information is not intended as a substitute for professional medical care. Always follow your healthcare professional's instructions.              Follow-ups after your visit        Who to contact     If you have questions or need follow up information about today's clinic visit or your schedule please contact [facility] directly at [phone].  Normal or non-critical lab and imaging results will be communicated to you by [employer], letter or phone within 4 business days after the clinic has received the results. If you do not hear from us within 7 days, please contact the clinic through [employer] or phone. If you have a critical or abnormal lab result, we will notify you by phone as soon as possible.  Submit refill requests through [employer] or call your pharmacy and they will forward the refill request to us. Please allow 3 business days for your refill to be completed.          Additional Information About Your Visit        [employer] Information     CONWE[employer]ER lets you send messages to your doctor, view your test results, renew your prescriptions, schedule appointments and more. To sign up, go to [email or web address] . Click on \"Log in\" on the left side of the screen, which will take you to the Welcome page. Then click on \"Sign up Now\" on the right side of the page.     You will be asked to enter the access code listed below, as well as some personal information. Please follow the directions to create your username and password.     Your access code is: [email or web address]  Expires: 5/3/2017  3:15 PM     Your access code will  in 90 days. If you need help or a new code, please call your [facility] clinic or [phone].        Care EveryWhere ID     This is your Care EveryWhere ID. This could be used by other organizations to access your [facility] medical records  [id number]        Your Vitals Were     Pulse " Temperature Pulse Oximetry             62 97.3  F (36.3  C) (Tympanic) 99%          Blood Pressure from Last 3 Encounters:   02/02/17 122/62   01/17/17 117/76   01/02/17 97/64    Weight from Last 3 Encounters:   02/02/17 173 lb 3.2 oz (78.563 kg)   01/17/17 175 lb (79.379 kg)   01/02/17 170 lb 6.4 oz (77.293 kg)              We Performed the Following     Asthma Action Plan (AAP)     CBC with platelets     Comprehensive metabolic panel     Hemoglobin A1c     Lipid Profile with reflex to direct LDL        Primary Care Provider Office Phone # Fax #    Maria Dolores Paredes Raghu, MYRA [facility] [phone]       [facility] [address] [place] [address]        Thank you!     Thank you for choosing [facility]  for your care. Our goal is always to provide you with excellent care. Hearing back from our patients is one way we can continue to improve our services. Please take a few minutes to complete the written survey that you may receive in the mail after your visit with us. Thank you!             Your Updated Medication List - Protect others around you: Learn how to safely use, store and throw away your medicines at www.disposemymeds.org.          This list is accurate as of: 2/2/17  3:15 PM.  Always use your most recent med list.                   Brand Name Dispense Instructions for use    albuterol 108 (90 BASE) MCG/ACT Inhaler    PROAIR HFA/PROVENTIL HFA/VENTOLIN HFA    3 Inhaler    Inhale 2 puffs into the lungs every 4 hours as needed for shortness of breath / dyspnea.       allopurinol 100 MG tablet    ZYLOPRIM    90 tablet    Take 1 tablet (100 mg) by mouth daily       aspirin 81 MG EC tablet      Take 1 tablet (81 mg) by mouth daily       atorvastatin 40 MG tablet    LIPITOR    90 tablet    Take 1 tablet (40 mg) by mouth daily       BENADRYL PO      Take 25 mg by mouth every 6 hours as needed for itching       L-ARGININE PO          DACIA PO          naproxen 500 MG tablet     NAPROSYN    30 tablet    Take 1 tablet (500 mg) by mouth daily       SELENIMIN-200 PO          SPIRULINA PO          STRESS B COMPLEX/IRON Tabs     100 tablet    Take 1 tablet by mouth daily       VITAMIN C PO      Take 250 mg by mouth       VITAMIN K PO

## 2017-02-02 NOTE — PROGRESS NOTES
SUBJECTIVE:                                                    Sen Watkins is a 56 year old male who presents to clinic today for the following health issues:      LIPID Follow-Up      Rate your low fat/cholesterol diet?: good    Taking statin?  Yes, no muscle aches from statin    Other lipid medications/supplements?:  none       Amount of exercise or physical activity: None    Problems taking medications regularly: No    Medication side effects: none    Diet: regular (no restrictions)      RIGHT SIDE IS STILL PAINFUL AND WEAK  WORKING 1 day a week for 3-4 hours return home exhausted for 3 days.  Taking naproxyn and allopurinol for gout.  Feels this combination is making right great toe pain better and swelling go down.   Feels bump is starting to resolve.   Got on disability now. Working some to have something to look forward to.   -------------------------------------    Problem list and histories reviewed & adjusted, as indicated.  Additional history: as documented      BP Readings from Last 3 Encounters:   02/02/17 122/62   01/17/17 117/76   01/02/17 97/64    Wt Readings from Last 3 Encounters:   02/02/17 173 lb 3.2 oz (78.563 kg)   01/17/17 175 lb (79.379 kg)   01/02/17 170 lb 6.4 oz (77.293 kg)                  Labs reviewed in EPIC  Problem list, Medication list, Allergies, and Medical/Social/Surgical histories reviewed in [facility] and updated as appropriate.    ROS:   ROS: 10 point ROS neg other than the symptoms noted above in the HPI.      OBJECTIVE:                                                    /62 mmHg  Pulse 62  Temp(Src) 97.3  F (36.3  C) (Tympanic)  Wt 173 lb 3.2 oz (78.563 kg)  SpO2 99%  Body mass index is 27.12 kg/(m^2).   GENERAL: healthy, alert, well nourished, well hydrated, no distress  HENT: ear canals- normal; TMs- normal; Nose- normal; Mouth- no ulcers, no lesions  NECK: no tenderness, no adenopathy, no asymmetry, no masses, no stiffness; thyroid- normal to  palpation  RESP: lungs clear to auscultation - no rales, no rhonchi, no wheezes  CV: regular rates and rhythm, normal S1 S2, no S3 or S4 and no murmur, no click or rub -  ABDOMEN: soft, no tenderness, no  hepatosplenomegaly, no masses, normal bowel sounds  MS: extremities- no gross deformities noted, no edema pain with palpation over the lateral ankle. No ligament laxity. Pedal pulses 2+    Diagnostic test results:  Results for orders placed or performed in visit on 02/02/17   Lipid Profile with reflex to direct LDL   Result Value Ref Range    Cholesterol 129 <200 mg/dL    Triglycerides 118 <150 mg/dL    HDL Cholesterol 67 >39 mg/dL    LDL Cholesterol Calculated 38 <100 mg/dL    Non HDL Cholesterol 62 <130 mg/dL   CBC with platelets   Result Value Ref Range    WBC 9.4 4.0 - 11.0 10e9/L    RBC Count 4.85 4.4 - 5.9 10e12/L    Hemoglobin 15.0 13.3 - 17.7 g/dL    Hematocrit 43.7 40.0 - 53.0 %    MCV 90 78 - 100 fl    MCH 30.9 26.5 - 33.0 pg    MCHC 34.3 31.5 - 36.5 g/dL    RDW 14.4 10.0 - 15.0 %    Platelet Count 258 150 - 450 10e9/L   Comprehensive metabolic panel   Result Value Ref Range    Sodium 143 133 - 144 mmol/L    Potassium 4.6 3.4 - 5.3 mmol/L    Chloride 110 (H) 94 - 109 mmol/L    Carbon Dioxide 27 20 - 32 mmol/L    Anion Gap 6 3 - 14 mmol/L    Glucose 95 70 - 99 mg/dL    Urea Nitrogen 24 7 - 30 mg/dL    Creatinine 0.91 0.66 - 1.25 mg/dL    GFR Estimate 86 >60 mL/min/1.7m2    GFR Estimate If Black >90   GFR Calc   >60 mL/min/1.7m2    Calcium 8.6 8.5 - 10.1 mg/dL    Bilirubin Total 0.4 0.2 - 1.3 mg/dL    Albumin 3.8 3.4 - 5.0 g/dL    Protein Total 7.4 6.8 - 8.8 g/dL    Alkaline Phosphatase 133 40 - 150 U/L    ALT 58 0 - 70 U/L    AST 38 0 - 45 U/L   Hemoglobin A1c   Result Value Ref Range    Hemoglobin A1C 5.7 4.3 - 6.0 %     No results found for this or any previous visit (from the past 744 hour(s)).       ASSESSMENT/PLAN:                                                    1. Occipital stroke  (H)  Follow-up with neurology as planned   Prevention strategies reviewed  - Lipid Profile with reflex to direct LDL  - CBC with platelets  - Comprehensive metabolic panel    2. Thalamic infarct, acute (H)  Follow-up with neurology as planned    3. Primary osteoarthritis of right foot  Symptomatic care strategies reviewed    4. Elevated blood sugar  Repeat fasting  - Hemoglobin A1c      Follow up with Provider - Call or return to the clinic with any worsening of symptoms or no resolution. Patient/Parent verbalized understanding and is in agreement. Medication side effects reviewed.   Current Outpatient Prescriptions   Medication Sig Dispense Refill     L-ARGININE PO        VITAMIN K PO        SPIRULINA PO        Dacia Root (DACIA PO)        Ascorbic Acid (VITAMIN C PO) Take 250 mg by mouth       Selenium (SELENIMIN-200 PO)        Multiple Vitamins-Iron (STRESS B COMPLEX/IRON) TABS Take 1 tablet by mouth daily 100 tablet 3     aspirin EC 81 MG EC tablet Take 1 tablet (81 mg) by mouth daily       DiphenhydrAMINE HCl (BENADRYL PO) Take 25 mg by mouth every 6 hours as needed for itching       allopurinol (ZYLOPRIM) 100 MG tablet Take 1 tablet (100 mg) by mouth daily 90 tablet 1     atorvastatin (LIPITOR) 40 MG tablet Take 1 tablet (40 mg) by mouth daily 90 tablet 1     albuterol (PROAIR HFA, PROVENTIL HFA, VENTOLIN HFA) 108 (90 BASE) MCG/ACT inhaler Inhale 2 puffs into the lungs every 4 hours as needed for shortness of breath / dyspnea. 3 Inhaler 3        See Patient Instructions    MYRA [facility]

## 2017-02-06 DIAGNOSIS — M1A.09X0 IDIOPATHIC CHRONIC GOUT OF MULTIPLE SITES WITHOUT TOPHUS: Primary | ICD-10-CM

## 2017-02-06 NOTE — TELEPHONE ENCOUNTER
Per epic was last taking for gout.  Called patient he felt he was to take the naproxen for 2 months after starting the allopurinol.  Denies any symptoms of gout.  Please advise.    Brenda Lobato RN

## 2017-02-06 NOTE — TELEPHONE ENCOUNTER
Patient is requesting a refill of Naproxen which is not on the current med list.  Last Filled 01/05/17  Last Qty 30  Last Office Visit 02/02/17    Thanks,  Carin Dunlap  Certified Pharmacy Technician  [facility]  [phone]

## 2017-02-07 RX ORDER — NAPROXEN 500 MG/1
500 TABLET ORAL DAILY
Qty: 30 TABLET | Refills: 0 | Status: SHIPPED | OUTPATIENT
Start: 2017-02-07 | End: 2017-03-21

## 2017-02-08 ASSESSMENT — ASTHMA QUESTIONNAIRES: ACT_TOTALSCORE: 25

## 2017-03-17 DIAGNOSIS — M1A.09X0 IDIOPATHIC CHRONIC GOUT OF MULTIPLE SITES WITHOUT TOPHUS: ICD-10-CM

## 2017-03-17 RX ORDER — NAPROXEN 500 MG/1
500 TABLET ORAL DAILY
Qty: 30 TABLET | Refills: 0 | Status: CANCELLED | OUTPATIENT
Start: 2017-03-17

## 2017-03-21 ENCOUNTER — OFFICE VISIT (OUTPATIENT)
Dept: FAMILY MEDICINE | Facility: CLINIC | Age: 57
End: 2017-03-21
Payer: MEDICAID

## 2017-03-21 ENCOUNTER — HOSPITAL ENCOUNTER (OUTPATIENT)
Dept: ULTRASOUND IMAGING | Facility: CLINIC | Age: 57
Discharge: HOME | End: 2017-03-21
Attending: NURSE PRACTITIONER | Admitting: NURSE PRACTITIONER
Payer: MEDICAID

## 2017-03-21 VITALS
HEART RATE: 67 BPM | WEIGHT: 163.4 LBS | OXYGEN SATURATION: 97 % | BODY MASS INDEX: 25.65 KG/M2 | HEIGHT: 67 IN | TEMPERATURE: 98.3 F | RESPIRATION RATE: 24 BRPM | DIASTOLIC BLOOD PRESSURE: 64 MMHG | SYSTOLIC BLOOD PRESSURE: 122 MMHG

## 2017-03-21 DIAGNOSIS — M79.89 SWELLING OF RIGHT UPPER EXTREMITY: ICD-10-CM

## 2017-03-21 DIAGNOSIS — G89.4 CHRONIC PAIN SYNDROME: Primary | ICD-10-CM

## 2017-03-21 DIAGNOSIS — F32.89 OTHER DEPRESSION: ICD-10-CM

## 2017-03-21 DIAGNOSIS — Z71.3 NUTRITIONAL COUNSELING: ICD-10-CM

## 2017-03-21 PROCEDURE — 93971 EXTREMITY STUDY: CPT | Mod: RT

## 2017-03-21 PROCEDURE — 99214 OFFICE O/P EST MOD 30 MIN: CPT | Performed by: NURSE PRACTITIONER

## 2017-03-21 ASSESSMENT — PAIN SCALES - GENERAL: PAINLEVEL: SEVERE PAIN (7)

## 2017-03-21 NOTE — NURSING NOTE
"Chief Complaint   Patient presents with     Musculoskeletal Problem       Initial There were no vitals taken for this visit. Estimated body mass index is 27.13 kg/(m^2) as calculated from the following:    Height as of 11/8/16: 5' 7\" (1.702 m).    Weight as of 2/2/17: 173 lb 3.2 oz (78.6 kg).  Medication Reconciliation: complete  "

## 2017-03-21 NOTE — MR AVS SNAPSHOT
After Visit Summary   3/21/2017    Sen Watkins    MRN: [id number]           Patient Information     Date Of Birth          1960        Visit Information        Provider Department      3/21/2017 2:20 PM Nika Barbosa APRN [facility]        Today's Diagnoses     Chronic pain syndrome    -  1    Nutritional counseling        Other depression        Swelling of right upper extremity          Care Instructions    [facility] will call you for intake.    You can take the Diclofenac up to three times a day as needed.    Do not take Ibuprofen, Naproxen or other NSAIDS like Aleve as you're taking the Diclofenac.    I'd like to have you get a Doppler ultrasound of your right arm.        Follow-ups after your visit        Additional Services     MENTAL HEALTH REFERRAL       Your provider has referred you to: [facility] Providers Intake Scheduling [phone]   [email or web address]    All scheduling is subject to the client's specific insurance plan & benefits, provider/location availability, and provider clinical specialities.  Please arrive 15 minutes early for your first appointment and bring your completed paperwork.    Please be aware that coverage of these services is subject to the terms and limitations of your health insurance plan.  Call member services at your health plan with any benefit or coverage questions.            NUTRITION REFERRAL       Your provider has referred you to: [facility] [place]: [facility] (on call location) - [place] [phone]   [email or web address]    Please be aware that coverage of these services is subject to the terms and limitations of your health insurance plan.  Call member services at your health plan with any benefit or coverage questions.      Please bring the following with you to your appointment:    (1) This referral request  (2) Any documents given to you regarding  "this referral  (3) Any specific questions you have about diet and/or food choices                  Future tests that were ordered for you today     Open Future Orders        Priority Expected Expires Ordered    US Lower Extremity Venous Mapping Right Routine  3/21/2018 3/21/2017            Who to contact     If you have questions or need follow up information about today's clinic visit or your schedule please contact [facility] directly at [phone].  Normal or non-critical lab and imaging results will be communicated to you by Moneyspyderhart, letter or phone within 4 business days after the clinic has received the results. If you do not hear from us within 7 days, please contact the clinic through Moneyspyderhart or phone. If you have a critical or abnormal lab result, we will notify you by phone as soon as possible.  Submit refill requests through Imaginatik or call your pharmacy and they will forward the refill request to us. Please allow 3 business days for your refill to be completed.          Additional Information About Your Visit        MoneyspyderharUserVoice Information     Imaginatik lets you send messages to your doctor, view your test results, renew your prescriptions, schedule appointments and more. To sign up, go to [email or web address] . Click on \"Log in\" on the left side of the screen, which will take you to the Welcome page. Then click on \"Sign up Now\" on the right side of the page.     You will be asked to enter the access code listed below, as well as some personal information. Please follow the directions to create your username and password.     Your access code is: [email or web address]  Expires: 5/3/2017  4:15 PM     Your access code will  in 90 days. If you need help or a new code, please call your [place] clinic or [phone].        Care EveryWhere ID     This is your Care EveryWhere ID. This could be used by other organizations to access your [place] medical records  [id number]        Your Vitals " "Were     Pulse Temperature Respirations Height Pulse Oximetry BMI (Body Mass Index)    67 98.3  F (36.8  C) (Tympanic) 24 5' 6.5\" (1.689 m) 97% 25.98 kg/m2       Blood Pressure from Last 3 Encounters:   03/21/17 122/64   02/02/17 122/62   01/17/17 117/76    Weight from Last 3 Encounters:   03/21/17 163 lb 6.4 oz (74.1 kg)   02/02/17 173 lb 3.2 oz (78.6 kg)   01/17/17 175 lb (79.4 kg)              We Performed the Following     MENTAL HEALTH REFERRAL     NUTRITION REFERRAL          Today's Medication Changes          These changes are accurate as of: 3/21/17  3:45 PM.  If you have any questions, ask your nurse or doctor.               Start taking these medicines.        Dose/Directions    diclofenac 50 MG EC tablet   Commonly known as:  VOLTAREN   Used for:  Chronic pain syndrome   Started by:  Nika Barbosa APRN CNP        Dose:  50 mg   Take 1 tablet (50 mg) by mouth 3 times daily as needed for moderate pain   Quantity:  60 tablet   Refills:  1         Stop taking these medicines if you haven't already. Please contact your care team if you have questions.     DACIA PO   Stopped by:  Nika Barbosa APRN CNP           SPIRULINA PO   Stopped by:  Nika Barbosa APRN CNP                Where to get your medicines      These medications were sent to [facility] [address]  [address] [facility] [address]     Phone:  [phone]     diclofenac 50 MG EC tablet                Primary Care Provider Office Phone # Fax #    MYRA Craig [phone]       [facility] [address] [facility] [address]        Thank you!     Thank you for choosing [facility]  for your care. Our goal is always to provide you with excellent care. Hearing back from our patients is one way we can continue to improve our services. Please take a few minutes to complete the written survey that you may receive in the mail after " your visit with us. Thank you!             Your Updated Medication List - Protect others around you: Learn how to safely use, store and throw away your medicines at www.disposemymeds.org.          This list is accurate as of: 3/21/17  3:45 PM.  Always use your most recent med list.                   Brand Name Dispense Instructions for use    albuterol 108 (90 BASE) MCG/ACT Inhaler    PROAIR HFA/PROVENTIL HFA/VENTOLIN HFA    3 Inhaler    Inhale 2 puffs into the lungs every 4 hours as needed for shortness of breath / dyspnea.       allopurinol 100 MG tablet    ZYLOPRIM    90 tablet    Take 1 tablet (100 mg) by mouth daily       aspirin 81 MG EC tablet      Take 1 tablet (81 mg) by mouth daily       atorvastatin 40 MG tablet    LIPITOR    90 tablet    Take 1 tablet (40 mg) by mouth daily       BENADRYL PO      Take 25 mg by mouth every 6 hours as needed for itching       diclofenac 50 MG EC tablet    VOLTAREN    60 tablet    Take 1 tablet (50 mg) by mouth 3 times daily as needed for moderate pain       HERBALS          L-ARGININE PO          SELENIMIN-200 PO          STRESS B COMPLEX/IRON Tabs     100 tablet    Take 1 tablet by mouth daily       VITAMIN C PO      Take 250 mg by mouth       VITAMIN K PO

## 2017-03-21 NOTE — PROGRESS NOTES
SUBJECTIVE:                                                    Sen Watkins is a 56 year old male who presents to clinic today for the following health issues:      Musculoskeletal problem/pain      Duration: worse 3 days but bothersome for 2 weeks    Description  Location: right shoulder, arm, right upper chest and right side abdomen,     Intensity:  severe    Accompanying signs and symptoms: radiation of pain to right arm With some swelling    History  Previous similar problem: no   Did have a stroke 8 months ago  Previous evaluation:  none    Precipitating or alleviating factors:  Trauma or overuse: no   Aggravating factors include: writing and testing is worse    Therapies tried and outcome: nothing     - Past week and a half. No injury. Raking a few months ago.   - Hasn't worked out due to pain. Lots of weakness on right.   - No need for Naproxen.   - Was participating in Physical therapy Following CVA of approximately  8 months ago. Review of notes indicates that he has had right arm pain since the time of the stroke. Physical therapy discharge note From January Indicates that he still has right arm pain with stress.  physical therapy doesn't want him walking more than 4 blocks as feels worse the next day.   - physical therapy gave him home program. Not been able to do exercises due ot home situation- hard to find a place.     Depression:  Crying more.   Inactivity is depressing.   History of depression. Tough after break up of 13 year marriage.  Anger flares easily.   Doesn't want to bother family or worry them.   Doesn't like being inactive.   Was on antidepressant in the past. Bad experience.       Has had headaches as well- taking ASA. Not totally focussed.     Requests to see nutritionist    Problem list and histories reviewed & adjusted, as indicated.  Additional history: as documented      Reviewed and updated as needed this visit by clinical staff       Reviewed and updated as needed this  "visit by Provider         ROS:  Constitutional, HEENT, cardiovascular, pulmonary, gi and gu systems are negative, except as otherwise noted.    OBJECTIVE:                                                    /64 (BP Location: Left arm, Patient Position: Chair, Cuff Size: Adult Regular)  Pulse 67  Temp 98.3  F (36.8  C) (Tympanic)  Resp 24  Ht 5' 6.5\" (1.689 m)  Wt 163 lb 6.4 oz (74.1 kg)  SpO2 97%  BMI 25.98 kg/m2  Body mass index is 25.98 kg/(m^2).  GENERAL: healthy, alert and no distress  EYES: Eyes grossly normal to inspection and conjunctivae and sclerae normal  NECK: no adenopathy, no asymmetry, masses, or scars and thyroid normal to palpation  RESP: lungs clear to auscultation - no rales, rhonchi or wheezes  CV: regular rate and rhythm, normal S1 S2, no S3 or S4, no murmur, click or rub, no peripheral edema and peripheral pulses strong  MS: no gross musculoskeletal defects noted, no edema  BACK: no CVA tenderness, no paralumbar tenderness  PSYCH: mentation appears normal and affect flat    Diagnostic Test Results:       ASSESSMENT/PLAN:                                                      1. Chronic pain syndrome  Most likely related to his CVA.  Will try additional therapy and diclofenac  - diclofenac (VOLTAREN) 50 MG EC tablet; Take 1 tablet (50 mg) by mouth 3 times daily as needed for moderate pain  Dispense: 60 tablet; Refill: 1  - PHYSICAL THERAPY REFERRAL    2. Nutritional counseling  At patient's request, will refer to dietitian.  He is interested in overall better nutrition  - NUTRITION REFERRAL    3. Other depression  Not interested in starting an  Antidepressant at this time.  He is agreeable to counseling  - MENTAL HEALTH REFERRAL    4. Swelling of right upper extremity  Will rule out DVT.  If ultrasound negative, then will refer back to physical therapy  - PHYSICAL THERAPY REFERRAL  - US Extremity Upper Venous rt; Future    Patient Instructions   [facility] will call you " for intake.    You can take the Diclofenac up to three times a day as needed.    Do not take Ibuprofen, Naproxen or other NSAIDS like Aleve as you're taking the Diclofenac.    I'd like to have you get a Doppler ultrasound of your right arm.      Nika Barbosa, NP, APRN [facility]  Swelling and pain in right upper arm

## 2017-03-21 NOTE — PATIENT INSTRUCTIONS
[facility] will call you for intake.    You can take the Diclofenac up to three times a day as needed.    Do not take Ibuprofen, Naproxen or other NSAIDS like Aleve as you're taking the Diclofenac.    I'd like to have you get a Doppler ultrasound of your right arm.

## 2017-03-23 NOTE — TELEPHONE ENCOUNTER
Per OV note from yesterday patient is not taking the naproxen any longer.  Patient was started on diclofenac at OV yesterday, so should not take additional naproxen.    Brenda Lobato RN

## 2017-04-27 DIAGNOSIS — G89.4 CHRONIC PAIN SYNDROME: ICD-10-CM

## 2017-04-27 NOTE — TELEPHONE ENCOUNTER
v      Last Written Prescription Date: 3/21/17  Last Quantity: 60, # refills: 1  Last Office Visit with [facility], [facility] or [facility] prescribing provider: 3/21/17       Creatinine   Date Value Ref Range Status   02/02/2017 0.91 0.66 - 1.25 mg/dL Final     Lab Results   Component Value Date    AST 38 02/02/2017     Lab Results   Component Value Date    ALT 58 02/02/2017     BP Readings from Last 3 Encounters:   03/21/17 122/64   02/02/17 122/62   01/17/17 117/76

## 2017-04-28 NOTE — TELEPHONE ENCOUNTER
If he is continuing to have significant pain, he should return to clinic for a visit. Nika Barbosa RNC, NP  [facility]

## 2017-05-01 ENCOUNTER — OFFICE VISIT (OUTPATIENT)
Dept: FAMILY MEDICINE | Facility: CLINIC | Age: 57
End: 2017-05-01
Payer: COMMERCIAL

## 2017-05-01 VITALS
SYSTOLIC BLOOD PRESSURE: 120 MMHG | TEMPERATURE: 97.2 F | BODY MASS INDEX: 27 KG/M2 | WEIGHT: 172 LBS | DIASTOLIC BLOOD PRESSURE: 78 MMHG | HEART RATE: 54 BPM | HEIGHT: 67 IN

## 2017-05-01 DIAGNOSIS — K21.00 GASTROESOPHAGEAL REFLUX DISEASE WITH ESOPHAGITIS: ICD-10-CM

## 2017-05-01 DIAGNOSIS — R68.83 CHILLS (WITHOUT FEVER): ICD-10-CM

## 2017-05-01 DIAGNOSIS — M10.071 ACUTE IDIOPATHIC GOUT OF RIGHT FOOT: Primary | ICD-10-CM

## 2017-05-01 DIAGNOSIS — R10.11 ABDOMINAL PAIN, RIGHT UPPER QUADRANT: ICD-10-CM

## 2017-05-01 LAB
ALBUMIN SERPL-MCNC: 3.7 G/DL (ref 3.4–5)
ALP SERPL-CCNC: 137 U/L (ref 40–150)
ALT SERPL W P-5'-P-CCNC: 59 U/L (ref 0–70)
AMYLASE SERPL-CCNC: 60 U/L (ref 30–110)
AST SERPL W P-5'-P-CCNC: 26 U/L (ref 0–45)
BILIRUB DIRECT SERPL-MCNC: 0.1 MG/DL (ref 0–0.2)
BILIRUB SERPL-MCNC: 0.6 MG/DL (ref 0.2–1.3)
LIPASE SERPL-CCNC: 133 U/L (ref 73–393)
PROT SERPL-MCNC: 7.2 G/DL (ref 6.8–8.8)
TSH SERPL DL<=0.005 MIU/L-ACNC: 1.71 MU/L (ref 0.4–4)

## 2017-05-01 PROCEDURE — 83690 ASSAY OF LIPASE: CPT | Performed by: NURSE PRACTITIONER

## 2017-05-01 PROCEDURE — 36415 COLL VENOUS BLD VENIPUNCTURE: CPT | Performed by: NURSE PRACTITIONER

## 2017-05-01 PROCEDURE — 82150 ASSAY OF AMYLASE: CPT | Performed by: NURSE PRACTITIONER

## 2017-05-01 PROCEDURE — 84443 ASSAY THYROID STIM HORMONE: CPT | Performed by: NURSE PRACTITIONER

## 2017-05-01 PROCEDURE — 99214 OFFICE O/P EST MOD 30 MIN: CPT | Performed by: NURSE PRACTITIONER

## 2017-05-01 PROCEDURE — 80076 HEPATIC FUNCTION PANEL: CPT | Performed by: NURSE PRACTITIONER

## 2017-05-01 NOTE — NURSING NOTE
"Chief Complaint   Patient presents with     Abdominal Pain       Initial /78 (Cuff Size: Adult Regular)  Pulse 54  Temp 97.2  F (36.2  C) (Tympanic)  Ht 5' 6.5\" (1.689 m)  Wt 172 lb (78 kg)  BMI 27.35 kg/m2 Estimated body mass index is 27.35 kg/(m^2) as calculated from the following:    Height as of this encounter: 5' 6.5\" (1.689 m).    Weight as of this encounter: 172 lb (78 kg).  Medication Reconciliation: complete    "

## 2017-05-01 NOTE — PROGRESS NOTES
SUBJECTIVE:                                                    Sen Watkins is a 56 year old male who presents to clinic today for the following health issues:      ABDOMINAL PAIN     Onset: 10 months     Description:   Character: Sharp and throbbing   Location: right upper quadrant  Radiation: None    Intensity: moderate    Progression of Symptoms:  worsening    Accompanying Signs & Symptoms:  Fever/Chills?: YES- occasionally   Gas/Bloating: no   Nausea: YES  Vomitting: YES  Diarrhea?: no   Constipation:no   Dysuria or Hematuria: no   Swelling in the area    History:   Trauma: no   Previous similar pain: YES - gall stones in past   Previous tests done: none    Precipitating factors:   Does the pain change with:     Food: no - lactose intolerance     BM: no     Urination: no     Alleviating factors:  None     Therapies Tried and outcome: vinegar     LMP:  not applicable     RUQ pain   Taking pain meds with the pain. Diclofenac gives him relief.   Helps the foot and side pain. H/o gout  Not as emotional as before.   Fever and chills when this is flared.     GERD relieved with vinegar most often.   Takes 1 tsp 3 times in an hour if needed.   Uses this rememdy 1-2 times a month    No abdominal surgery. RUQ pain   Intermittent chills  H/o kidney stone.   No problems with urination or stooling    Nauseated and vomitting throughout his whole life.  Vomits 3-4 times a week.   Oily greasy food makes this worse.   Feels like he is never full.   No constipation. Drinking a lot of water  No etoh use.               -------------------------------------    Problem list and histories reviewed & adjusted, as indicated.  Additional history: as documented    Patient Active Problem List   Diagnosis     CARDIOVASCULAR SCREENING; LDL GOAL LESS THAN 130     Intermittent asthma     Nephrolithiasis     Advanced directives, counseling/discussion     Acute ischemic stroke (H)     Acute idiopathic gout of right foot     Screening  "for prostate cancer     Occipital stroke (H)     Thalamic infarct, acute (H)     Situational anxiety     Osteoarthritis of right foot, unspecified osteoarthritis type     History reviewed. No pertinent surgical history.    Social History   Substance Use Topics     Smoking status: Former Smoker     Types: Cigarettes     Quit date: 4/26/2012     Smokeless tobacco: Former User     Alcohol use No     Family History   Problem Relation Age of Onset     Coronary Artery Disease Mother      CEREBROVASCULAR DISEASE Brother      Psychotic Disorder Sister      Psychotic Disorder Brother            Reviewed and updated as needed this visit by clinical staff       Reviewed and updated as needed this visit by Provider         ROS:   ROS: 10 point ROS neg other than the symptoms noted above in the HPI.      OBJECTIVE:                                                    /78 (Cuff Size: Adult Regular)  Pulse 54  Temp 97.2  F (36.2  C) (Tympanic)  Ht 5' 6.5\" (1.689 m)  Wt 172 lb (78 kg)  BMI 27.35 kg/m2  Body mass index is 27.35 kg/(m^2).   GENERAL: healthy, alert, well nourished, well hydrated, no distress  HENT: ear canals- normal; TMs- normal; Nose- normal; Mouth- no ulcers, no lesions  NECK: no tenderness, no adenopathy, no asymmetry, no masses, no stiffness; thyroid- normal to palpation  RESP: lungs clear to auscultation - no rales, no rhonchi, no wheezes  CV: regular rates and rhythm, normal S1 S2, no S3 or S4 and no murmur, no click or rub -  ABDOMEN: soft, no tenderness, no  hepatosplenomegaly, no masses, normal bowel sounds  MS: extremities- no gross deformities noted,  Mild erythema and  Edema right great toe     Diagnostic test results:  Results for orders placed or performed in visit on 05/01/17   Amylase   Result Value Ref Range    Amylase 60 30 - 110 U/L   Lipase   Result Value Ref Range    Lipase 133 73 - 393 U/L   TSH with free T4 reflex   Result Value Ref Range    TSH 1.71 0.40 - 4.00 mU/L   Hepatic panel " (Albumin, ALT, AST, Bili, Alk Phos, TP)   Result Value Ref Range    Bilirubin Direct 0.1 0.0 - 0.2 mg/dL    Bilirubin Total 0.6 0.2 - 1.3 mg/dL    Albumin 3.7 3.4 - 5.0 g/dL    Protein Total 7.2 6.8 - 8.8 g/dL    Alkaline Phosphatase 137 40 - 150 U/L    ALT 59 0 - 70 U/L    AST 26 0 - 45 U/L        ASSESSMENT/PLAN:                                                    1. Acute idiopathic gout of right foot  Continue   - diclofenac (VOLTAREN) 50 MG EC tablet; Take 1 tablet (50 mg) by mouth 3 times daily as needed for moderate pain  Dispense: 90 tablet; Refill: 3  Continue allpurinol.     2. Gastroesophageal reflux disease with esophagitis  Symptomatic care strategies reviewed.   To add in ES tums before meals and at HS as needed  Zantac 300 mg at HS with ongoing symptoms.     3. Abdominal pain, right upper quadrant  - US Abdomen Complete; Future  - Amylase  - Lipase  - Hepatic panel (Albumin, ALT, AST, Bili, Alk Phos, TP)    4. Chills (without fever)  - TSH with free T4 reflex      Follow up with Provider - Neurology s/p stroke as planned.  Call or return to the clinic with any worsening of symptoms or no resolution. Patient/Parent verbalized understanding and is in agreement. Medication side effects reviewed.   Current Outpatient Prescriptions   Medication Sig Dispense Refill     diclofenac (VOLTAREN) 50 MG EC tablet Take 1 tablet (50 mg) by mouth 3 times daily as needed for moderate pain 90 tablet 3     HERBALS        allopurinol (ZYLOPRIM) 100 MG tablet Take 1 tablet (100 mg) by mouth daily 90 tablet 1     atorvastatin (LIPITOR) 40 MG tablet Take 1 tablet (40 mg) by mouth daily 90 tablet 1     Multiple Vitamins-Iron (STRESS B COMPLEX/IRON) TABS Take 1 tablet by mouth daily 100 tablet 3     aspirin EC 81 MG EC tablet Take 1 tablet (81 mg) by mouth daily       DiphenhydrAMINE HCl (BENADRYL PO) Take 25 mg by mouth every 6 hours as needed for itching       albuterol (PROAIR HFA, PROVENTIL HFA, VENTOLIN HFA) 108 (90 BASE)  MCG/ACT inhaler Inhale 2 puffs into the lungs every 4 hours as needed for shortness of breath / dyspnea. 3 Inhaler 3        See Patient Instructions    MYRA Craig [facility]

## 2017-05-01 NOTE — MR AVS SNAPSHOT
After Visit Summary   5/1/2017    Sen Watkins    MRN: [id number]           Patient Information     Date Of Birth          1960        Visit Information        Provider Department      5/1/2017 12:40 PM Maria Dolores Krishnamurthy APRN [facility]        Today's Diagnoses     Acute idiopathic gout of right foot    -  1    Gastroesophageal reflux disease with esophagitis        Abdominal pain, right upper quadrant        Chills (without fever)          Care Instructions      * Abdominal Pain,Uncertain Cause [Male]  Based on your visit today, the exact cause of your abdominal (stomach) pain is not clear. Your exam and tests do not indicate a dangerous cause at this time. However, the signs of a serious problem may take more time to appear. Although your exam was reassuring today, sometimes early in the course of many conditions, exam and lab tests can appear normal. Therefore, it is important for you to watch for any new symptoms or worsening of your condition.  Causes:  It may not be obvious what caused your symptoms. Pay attention to things that do seem to make your symptoms worse or better and discuss this with your doctor when you follow up.  Diagnosis:  The evaluation of abdominal pain in the emergency department may only require an exam by the doctor or it may include blood, urine or imaging studies, depending on many factors. Sometimes exams and tests can identify a cause but in many cases, a clear cause is not found. Further testing at follow up visits may help to suggest a clear diagnosis.  Home Care:    Rest as much as possible until your next exam.    Try to avoid any medications (unless otherwise directed by your doctor), foods, activities, or other factors that you may have contributed to your symptoms.    Try to eat foods that you know that you have tolerated well in the past. Certain diets may be recommended for some conditions that cause abdominal pain.  However, since the cause of your symptoms may not be clear, discuss your diet more with your primary care provider or specialist for further recommendations.     Eating several small meals per day as opposed to 2 or 3 larger meals may help.    Monitor closely for anything that may make your symptoms worse or better. Pay close attention to symptoms below that may indicate worsening of your condition.  Follow Up And Precautions:  See your doctor or this facility as instructed (or sooner, if your symptoms are not improving). In some cases, you may need more testing.  Contact Your Doctor Or Seek Medical Attention  if any of the following occur:    Pain is becoming worse    You are unable to take your medications because of too much vomiting    Swelling of the abdomen    Fever of 101 F (38.3 C) or higher, or as directed by your health care provider    Blood in vomit or bowel movements (dark red or black color)    Jaundice (yellow color of eyes and skin)    New onset of weakness, dizziness or fainting    New onset of chest, arm, back, neck or jaw pain    5988-8172 [place] [address]. All rights reserved. This information is not intended as a substitute for professional medical care. Always follow your healthcare professional's instructions.        Gout Diet  Gout is a painful condition caused by an excess of uric acid, a waste product made by the body. Uric acid forms crystals that collect in the joints. This brings on symptoms of joint pain and swelling. This is called a gout attack. Often, medications and diet changes are combined to manage gout. Below are some guidelines for changing your diet to help you manage gout and prevent attacks. Your health care provider will help you determine the best eating plan for you.     Limiting or avoiding certain foods can help prevent gout attacks.   Eating to manage gout  Weight loss for those who are overweight may help reduce gout  attacks.  Eat less of these foods  Eating too many foods containing purines may raise the levels of uric acid in your body. This raises your risk for a gout attack. Try to limit these foods and drinks:    Alcohol, such as beer and red wine. You may be told to avoid alcohol completely.    Soft drinks that contain sugar or high fructose corn syrup    Certain fish, including anchovies, sardines, fish eggs, and herring    Certain meats, such as red meat, hot dogs, luncheon meats, and turkey    Organ meats, such as liver, kidneys, and sweetbreads    Legumes, such as dried beans and peas    Mushrooms, spinach, asparagus, and cauliflower    Other high fat foods such as gravy, whole milk, and high fat cheeses  Eat more of these foods  Other foods may be helpful for people with gout. Add some of these foods to your diet:    Dark berries, such as blueberries, blackberries, and cherries. These contain chemicals that may lower uric acid.    Tofu, a source of protein made from soy. Studies have shown that it may be a better choice than meat for people with gout.    Omega fatty acids. These are found in some fatty fish such as salmon, certain oils (flax, olive, or nut), and nuts themselves. Omega fatty acids may help prevent inflammation due to gout.    Dairy products that are low-fat or fat-free, such as cheese and yogurt    Complex carbohydrate foods, including whole grains, brown rice, oats, and beans    Coffee, in moderation  Follow-up care  Follow up with your health care provider, or as advised.  When to seek medical advice  Call your health care provider right away if any of these occur:    Return of gout symptoms, usually at night:    Severe pain, swelling, and heat in a joint, especially the base of the big toe    Affected joint is hard to move    Skin of the affected joint is purple or red    Fever of 100.4 F (38 C) or higher    Pain that doesn't get better even with prescribed medicine     3054-8135 [employer]. [address] [place] [address], [place] [address]. All rights reserved. This information is not intended as a substitute for professional medical care. Always follow your healthcare professional's instructions.        Preventing Kidney Stones  If you ve had a kidney stone, you may worry that you ll have another. Removing or passing your stone doesn t prevent future stones. With your doctor s help, though, you can reduce your risk of forming new stones. Follow up with your doctor to help detect new stones. You may need follow-up every 3 months to a year for a lifetime.    Drink lots of water  Staying well-hydrated is the best way to reduce your risk of future stones. Drink 8 12-ounce glasses of water daily. Have 2 with each meal and 2 between meals. Try keeping a pitcher of water nearby during the day and at night.  Take medications if needed  Medications, including vitamins and minerals, may be prescribed for certain types of stones. You may want to write your doses and medication times on a calendar. Some medications decrease stone-forming chemicals in your blood. Others help prevent those chemicals from crystallizing in urine. Still others help keep a normal acid balance in your urine.  Follow your prescribed diet  Your doctor will tell you which foods contain the chemicals you should avoid. Your doctor may also suggest talking to a dietitian. He or she can help you plan meals you ll enjoy. These meals won t put you at risk for future stones. You may be told to limit certain foods, depending on which type of stones you ve had. You should limit the amount of salt in your food to about 2 grams a day. This will help prevent most types of kidney stones. Make sure you get an adequate amount of calcium in your diet.  For calcium oxalate stones: Limit animal protein, such as meat, eggs, and fish. Limit grapefruit juice and alcohol. Limit high-oxalate foods (such as cola, tea, chocolate, spinach, rhubarb, wheat  "bran, and peanuts).  For uric acid stones: Limit high-purine foods, such as mushrooms, peas, beans, anchovies, meat, poultry, shellfish, and organ meats. These foods increase uric acid production.  For cystine stones: Limit high-methionine foods (fish is the most common, but eggs and meats, also). These foods increase production of cystine.    1368-7315 [employer]. [address] [place] [address]. All rights reserved. This information is not intended as a substitute for professional medical care. Always follow your healthcare professional's instructions.              Follow-ups after your visit        Future tests that were ordered for you today     Open Future Orders        Priority Expected Expires Ordered    US Abdomen Complete Routine 7/30/2017 5/1/2018 5/1/2017            Who to contact     If you have questions or need follow up information about today's clinic visit or your schedule please contact [facility] directly at [phone].  Normal or non-critical lab and imaging results will be communicated to you by [employer], letter or phone within 4 business days after the clinic has received the results. If you do not hear from us within 7 days, please contact the clinic through [employer] or phone. If you have a critical or abnormal lab result, we will notify you by phone as soon as possible.  Submit refill requests through [employer] or call your pharmacy and they will forward the refill request to us. Please allow 3 business days for your refill to be completed.          Additional Information About Your Visit        YouHelp Information     YouHelp lets you send messages to your doctor, view your test results, renew your prescriptions, schedule appointments and more. To sign up, go to [email or web address] . Click on \"Log in\" on the left side of the screen, which will take you to the Welcome page. Then click on \"Sign up Now\" on the right side of the page.     You will be " "asked to enter the access code listed below, as well as some personal information. Please follow the directions to create your username and password.     Your access code is: [email or web address]  Expires: 5/3/2017  4:15 PM     Your access code will  in 90 days. If you need help or a new code, please call your [facility] clinic or [phone].        Care EveryWhere ID     This is your Care EveryWhere ID. This could be used by other organizations to access your [facility] medical records  [id number]        Your Vitals Were     Pulse Temperature Height BMI (Body Mass Index)          54 97.2  F (36.2  C) (Tympanic) 5' 6.5\" (1.689 m) 27.35 kg/m2         Blood Pressure from Last 3 Encounters:   17 120/78   17 122/64   17 122/62    Weight from Last 3 Encounters:   17 172 lb (78 kg)   17 163 lb 6.4 oz (74.1 kg)   17 173 lb 3.2 oz (78.6 kg)              We Performed the Following     Amylase     Hepatic panel (Albumin, ALT, AST, Bili, Alk Phos, TP)     Lipase     TSH with free T4 reflex          Today's Medication Changes          These changes are accurate as of: 17  1:26 PM.  If you have any questions, ask your nurse or doctor.               Stop taking these medicines if you haven't already. Please contact your care team if you have questions.     L-ARGININE PO   Stopped by:  Maria Dolores Krishnamurthy APRN CNP           SELENIMIN-200 PO   Stopped by:  Maria Dolores Krishnamurthy APRN CNP           VITAMIN C PO   Stopped by:  Maria Dolores Krishnamurthy APRN CNP           VITAMIN K PO   Stopped by:  Maria Dolores Krishnamurthy APRN CNP                Where to get your medicines      These medications were sent to [facility] [place], [place] - [address]  [address] [facility] [address]     Phone:  [phone]     diclofenac 50 MG EC tablet                Primary Care Provider Office Phone # Fax #    MYRA Craig [phone]       [facility]" [facility] [address] [facility] [address]        Thank you!     Thank you for choosing [facility]  for your care. Our goal is always to provide you with excellent care. Hearing back from our patients is one way we can continue to improve our services. Please take a few minutes to complete the written survey that you may receive in the mail after your visit with us. Thank you!             Your Updated Medication List - Protect others around you: Learn how to safely use, store and throw away your medicines at www.disposemymeds.org.          This list is accurate as of: 5/1/17  1:26 PM.  Always use your most recent med list.                   Brand Name Dispense Instructions for use    albuterol 108 (90 BASE) MCG/ACT Inhaler    PROAIR HFA/PROVENTIL HFA/VENTOLIN HFA    3 Inhaler    Inhale 2 puffs into the lungs every 4 hours as needed for shortness of breath / dyspnea.       allopurinol 100 MG tablet    ZYLOPRIM    90 tablet    Take 1 tablet (100 mg) by mouth daily       aspirin 81 MG EC tablet      Take 1 tablet (81 mg) by mouth daily       atorvastatin 40 MG tablet    LIPITOR    90 tablet    Take 1 tablet (40 mg) by mouth daily       BENADRYL PO      Take 25 mg by mouth every 6 hours as needed for itching       diclofenac 50 MG EC tablet    VOLTAREN    90 tablet    Take 1 tablet (50 mg) by mouth 3 times daily as needed for moderate pain       HERBALS          STRESS B COMPLEX/IRON Tabs     100 tablet    Take 1 tablet by mouth daily

## 2017-05-01 NOTE — PATIENT INSTRUCTIONS
* Abdominal Pain,Uncertain Cause [Male]  Based on your visit today, the exact cause of your abdominal (stomach) pain is not clear. Your exam and tests do not indicate a dangerous cause at this time. However, the signs of a serious problem may take more time to appear. Although your exam was reassuring today, sometimes early in the course of many conditions, exam and lab tests can appear normal. Therefore, it is important for you to watch for any new symptoms or worsening of your condition.  Causes:  It may not be obvious what caused your symptoms. Pay attention to things that do seem to make your symptoms worse or better and discuss this with your doctor when you follow up.  Diagnosis:  The evaluation of abdominal pain in the emergency department may only require an exam by the doctor or it may include blood, urine or imaging studies, depending on many factors. Sometimes exams and tests can identify a cause but in many cases, a clear cause is not found. Further testing at follow up visits may help to suggest a clear diagnosis.  Home Care:    Rest as much as possible until your next exam.    Try to avoid any medications (unless otherwise directed by your doctor), foods, activities, or other factors that you may have contributed to your symptoms.    Try to eat foods that you know that you have tolerated well in the past. Certain diets may be recommended for some conditions that cause abdominal pain. However, since the cause of your symptoms may not be clear, discuss your diet more with your primary care provider or specialist for further recommendations.     Eating several small meals per day as opposed to 2 or 3 larger meals may help.    Monitor closely for anything that may make your symptoms worse or better. Pay close attention to symptoms below that may indicate worsening of your condition.  Follow Up And Precautions:  See your doctor or this facility as instructed (or sooner, if your symptoms are not  improving). In some cases, you may need more testing.  Contact Your Doctor Or Seek Medical Attention  if any of the following occur:    Pain is becoming worse    You are unable to take your medications because of too much vomiting    Swelling of the abdomen    Fever of 101 F (38.3 C) or higher, or as directed by your health care provider    Blood in vomit or bowel movements (dark red or black color)    Jaundice (yellow color of eyes and skin)    New onset of weakness, dizziness or fainting    New onset of chest, arm, back, neck or jaw pain    0607-8709 [address] [address]. All rights reserved. This information is not intended as a substitute for professional medical care. Always follow your healthcare professional's instructions.        Gout Diet  Gout is a painful condition caused by an excess of uric acid, a waste product made by the body. Uric acid forms crystals that collect in the joints. This brings on symptoms of joint pain and swelling. This is called a gout attack. Often, medications and diet changes are combined to manage gout. Below are some guidelines for changing your diet to help you manage gout and prevent attacks. Your health care provider will help you determine the best eating plan for you.     Limiting or avoiding certain foods can help prevent gout attacks.   Eating to manage gout  Weight loss for those who are overweight may help reduce gout attacks.  Eat less of these foods  Eating too many foods containing purines may raise the levels of uric acid in your body. This raises your risk for a gout attack. Try to limit these foods and drinks:    Alcohol, such as beer and red wine. You may be told to avoid alcohol completely.    Soft drinks that contain sugar or high fructose corn syrup    Certain fish, including anchovies, sardines, fish eggs, and herring    Certain meats, such as red meat, hot dogs, luncheon meats, and turkey    Organ meats, such as liver,  kidneys, and sweetbreads    Legumes, such as dried beans and peas    Mushrooms, spinach, asparagus, and cauliflower    Other high fat foods such as gravy, whole milk, and high fat cheeses  Eat more of these foods  Other foods may be helpful for people with gout. Add some of these foods to your diet:    Dark berries, such as blueberries, blackberries, and cherries. These contain chemicals that may lower uric acid.    Tofu, a source of protein made from soy. Studies have shown that it may be a better choice than meat for people with gout.    Omega fatty acids. These are found in some fatty fish such as salmon, certain oils (flax, olive, or nut), and nuts themselves. Omega fatty acids may help prevent inflammation due to gout.    Dairy products that are low-fat or fat-free, such as cheese and yogurt    Complex carbohydrate foods, including whole grains, brown rice, oats, and beans    Coffee, in moderation  Follow-up care  Follow up with your health care provider, or as advised.  When to seek medical advice  Call your health care provider right away if any of these occur:    Return of gout symptoms, usually at night:    Severe pain, swelling, and heat in a joint, especially the base of the big toe    Affected joint is hard to move    Skin of the affected joint is purple or red    Fever of 100.4 F (38 C) or higher    Pain that doesn't get better even with prescribed medicine     0746-0376 [employer]. [address] [place] [address]. All rights reserved. This information is not intended as a substitute for professional medical care. Always follow your healthcare professional's instructions.        Preventing Kidney Stones  If you ve had a kidney stone, you may worry that you ll have another. Removing or passing your stone doesn t prevent future stones. With your doctor s help, though, you can reduce your risk of forming new stones. Follow up with your doctor to help detect new stones. You may need  follow-up every 3 months to a year for a lifetime.    Drink lots of water  Staying well-hydrated is the best way to reduce your risk of future stones. Drink 8 12-ounce glasses of water daily. Have 2 with each meal and 2 between meals. Try keeping a pitcher of water nearby during the day and at night.  Take medications if needed  Medications, including vitamins and minerals, may be prescribed for certain types of stones. You may want to write your doses and medication times on a calendar. Some medications decrease stone-forming chemicals in your blood. Others help prevent those chemicals from crystallizing in urine. Still others help keep a normal acid balance in your urine.  Follow your prescribed diet  Your doctor will tell you which foods contain the chemicals you should avoid. Your doctor may also suggest talking to a dietitian. He or she can help you plan meals you ll enjoy. These meals won t put you at risk for future stones. You may be told to limit certain foods, depending on which type of stones you ve had. You should limit the amount of salt in your food to about 2 grams a day. This will help prevent most types of kidney stones. Make sure you get an adequate amount of calcium in your diet.  For calcium oxalate stones: Limit animal protein, such as meat, eggs, and fish. Limit grapefruit juice and alcohol. Limit high-oxalate foods (such as cola, tea, chocolate, spinach, rhubarb, wheat bran, and peanuts).  For uric acid stones: Limit high-purine foods, such as mushrooms, peas, beans, anchovies, meat, poultry, shellfish, and organ meats. These foods increase uric acid production.  For cystine stones: Limit high-methionine foods (fish is the most common, but eggs and meats, also). These foods increase production of cystine.    2841-8185 [employer]. [address] [place] [address], [place] [address]. All rights reserved. This information is not intended as a substitute for professional medical care. Always  follow your healthcare professional's instructions.

## 2017-05-01 NOTE — LETTER
[facility]  [address] [facility] [place] [address]  Phone: [phone]    May 3, 2017    Sen Jose Watkins  [address]   [place] [address]              Dear Mr. Watkins,      The results of your recent lab tests were within normal limits. Enclosed is a copy of these results.  If you have any further questions or problems, please contact our office.          Sincerely,      Maria Dolores Krishnamurthy FNP-BC / ac      Liver Panel       Latest Ref Rng & Units 5/1/2017   Bilirubin Direct      0.0 - 0.2 mg/dL 0.1   Bilirubin Total      0.2 - 1.3 mg/dL 0.6   Albumin      3.4 - 5.0 g/dL 3.7   Protein Total      6.8 - 8.8 g/dL 7.2   Alkaline Phosphatase      40 - 150 U/L 137   ALT      0 - 70 U/L 59   AST      0 - 45 U/L 26                     tww        Component      Latest Ref Rng & Units 5/1/2017   TSH      0.40 - 4.00 mU/L 1.71       Component      Latest Ref Rng & Units 5/1/2017   Lipase      73 - 393 U/L 133     Component      Latest Ref Rng & Units 5/1/2017   Amylase      30 - 110 U/L 60

## 2017-05-04 ENCOUNTER — HOSPITAL ENCOUNTER (OUTPATIENT)
Dept: ULTRASOUND IMAGING | Facility: CLINIC | Age: 57
Discharge: HOME | End: 2017-05-04
Attending: NURSE PRACTITIONER | Admitting: NURSE PRACTITIONER
Payer: COMMERCIAL

## 2017-05-04 DIAGNOSIS — R10.11 ABDOMINAL PAIN, RIGHT UPPER QUADRANT: ICD-10-CM

## 2017-05-04 PROCEDURE — 76700 US EXAM ABDOM COMPLETE: CPT

## 2017-07-10 DIAGNOSIS — M1A.09X0 IDIOPATHIC CHRONIC GOUT OF MULTIPLE SITES WITHOUT TOPHUS: ICD-10-CM

## 2017-07-10 NOTE — TELEPHONE ENCOUNTER
ALLOPURINOL 100MG TABS       Last Written Prescription Date: 1/5/2017  Last Fill Quantity: 90, # refills: 1  Last Office Visit with [facility] or [facility] prescribing provider:  5/1/2017        Uric Acid   Date Value Ref Range Status   01/02/2017 4.6 3.5 - 7.2 mg/dL Final   ]  Creatinine   Date Value Ref Range Status   02/02/2017 0.91 0.66 - 1.25 mg/dL Final   ]  Lab Results   Component Value Date    WBC 9.4 02/02/2017     Lab Results   Component Value Date    RBC 4.85 02/02/2017     Lab Results   Component Value Date    HGB 15.0 02/02/2017     Lab Results   Component Value Date    HCT 43.7 02/02/2017     No components found for: MCT  Lab Results   Component Value Date    MCV 90 02/02/2017     Lab Results   Component Value Date    MCH 30.9 02/02/2017     Lab Results   Component Value Date    MCHC 34.3 02/02/2017     Lab Results   Component Value Date    RDW 14.4 02/02/2017     Lab Results   Component Value Date     02/02/2017     Lab Results   Component Value Date    AST 26 05/01/2017     Lab Results   Component Value Date    ALT 59 05/01/2017

## 2017-07-11 RX ORDER — ALLOPURINOL 100 MG/1
TABLET ORAL
Qty: 90 TABLET | Refills: 1 | Status: SHIPPED | OUTPATIENT
Start: 2017-07-11 | End: 2018-01-11

## 2017-07-12 ENCOUNTER — NURSE TRIAGE (OUTPATIENT)
Dept: NURSING | Facility: CLINIC | Age: 57
End: 2017-07-12

## 2017-07-12 NOTE — TELEPHONE ENCOUNTER
"  Reason for Disposition    Numbness (i.e., loss of sensation) in hand or fingers     Not loss of sensation, but is a \"strange, hot sensation in both forearms\"    Additional Information    Negative: Shock suspected (e.g., cold/pale/clammy skin, too weak to stand, low BP, rapid pulse)    Negative: [1] Similar pain previously AND [2] it was from \"heart attack\"    Negative: [1] Similar pain previously AND [2] it was from \"angina\" AND [3] not relieved by nitroglycerin    Negative: Sounds like a life-threatening emergency to the triager    Negative: Followed an arm injury    Negative: Chest pain    Negative: Pain, redness, or swelling at intravenous (IV) site or along course of vein    Negative: Wound looks infected    Negative: Elbow pain is main symptom    Negative: Wrist pain is main symptom    Negative: Difficulty breathing or unusual sweating (e.g., sweating without exertion)    Negative: [1] Age > 40 AND [2] associated chest or jaw pain AND [3] pain lasts > 5 minutes    Negative: [1] Age > 40 AND [2] no obvious cause AND [3] pain even when not moving the arm    (Exception: pain is clearly made worse by moving arm or bending neck)    Negative: [1] SEVERE pain AND [2] not improved 2 hours after pain medicine    Negative: [1] Red area or streak AND [2] fever    Negative: [1] Swollen joint AND [2] fever    Negative: Patient sounds very sick or weak to the triager    Negative: [1] Red area or streak AND [2] large (> 2 in. or 5 cm)    Negative: Entire arm is swollen    Negative: [1] Cast on wrist or arm AND [2] now increased pain    Negative: Weakness (i.e., loss of strength) in hand or fingers     (Exception: not truly weak; hand feels weak because of pain)    Negative: [1] Arm pains with exertion (e.g., walking) AND [2] pain goes away on resting AND [3] not present now    Negative: [1] Painful rash AND [2] multiple small blisters grouped together (i.e., dermatomal distribution or \"band\" or \"stripe\")    Negative: Looks " "like a boil, infected sore, deep ulcer or other infected rash (spreading redness, pus)    Negative: [1] Localized rash is very painful AND [2] no fever    Negative: Localized pain, redness or hard lump along vein    Answer Assessment - Initial Assessment Questions  1. ONSET: \"When did the pain start?\"      3 weeks ago  2. LOCATION: \"Where is the pain located?\"      Bilateral forearms  3. PAIN: \"How bad is the pain?\" (Scale 1-10; or mild, moderate, severe)    - MILD (1-3): doesn't interfere with normal activities    - MODERATE (4-7): interferes with normal activities (e.g., work or school) or awakens from sleep    - SEVERE (8-10): excruciating pain, unable to do any normal activities, unable to hold a cup of water      No pain, he gets a \"hot sensation\"  4. WORK OR EXERCISE: \"Has there been any recent work or exercise that involved this part of the body?\"      no  5. CAUSE: \"What do you think is causing the arm pain?\"      Not sure  6. OTHER SYMPTOMS: \"Do you have any other symptoms?\" (e.g., neck pain, swelling, rash, fever, numbness, weakness)      no  7. PREGNANCY: \"Is there any chance you are pregnant?\" \"When was your last menstrual period?\"      no    Protocols used: ARM PAIN-ADULT-AH    "

## 2017-09-26 ENCOUNTER — OFFICE VISIT (OUTPATIENT)
Dept: FAMILY MEDICINE | Facility: CLINIC | Age: 57
End: 2017-09-26
Payer: COMMERCIAL

## 2017-09-26 VITALS
SYSTOLIC BLOOD PRESSURE: 120 MMHG | BODY MASS INDEX: 25.44 KG/M2 | TEMPERATURE: 97.9 F | DIASTOLIC BLOOD PRESSURE: 68 MMHG | WEIGHT: 160 LBS | OXYGEN SATURATION: 97 % | HEART RATE: 65 BPM

## 2017-09-26 DIAGNOSIS — K90.49 FOOD INTOLERANCE IN ADULT: Primary | ICD-10-CM

## 2017-09-26 DIAGNOSIS — J45.20 INTERMITTENT ASTHMA, UNCOMPLICATED: ICD-10-CM

## 2017-09-26 DIAGNOSIS — M10.071 ACUTE IDIOPATHIC GOUT OF RIGHT FOOT: ICD-10-CM

## 2017-09-26 PROCEDURE — 99214 OFFICE O/P EST MOD 30 MIN: CPT | Performed by: NURSE PRACTITIONER

## 2017-09-26 NOTE — MR AVS SNAPSHOT
After Visit Summary   9/26/2017    Sen Watkins    MRN: [id number]           Patient Information     Date Of Birth          1960        Visit Information        Provider Department      9/26/2017 10:20 AM Maria Dolores Krishnamurthy APRN [facility]        Today's Diagnoses     Food intolerance in adult    -  1    Acute idiopathic gout of right foot        Intermittent asthma, uncomplicated          Care Instructions    Asthma Medicines  Controllers and relievers  Controller medicines   Medicines that help control asthma and prevent symptoms are called controllers. They work over time--they will not relieve symptoms quickly. Some people take more than one controller.   Names of the controllers you take:   ________________________________________  ________________________________________  How do they work?  Controllers help prevent asthma attacks. They take down swelling, relax airway muscles and reduce mucus over time. This keeps your airways open. They also block your body's response to asthma triggers (things that cause asthma symptoms).  How do I take them?  Some controllers are taken by mouth. Others are breathed in through an inhaler.  Your doctor will tell you how to take your medicine. Be sure to take it every day, at the same times each day.  Reliever medicines   Medicines that relieve an asthma flare-up quickly are called relievers. Some people take more than one reliever.   Names of the relievers you use:   ________________________________________  ________________________________________  How do they work?  Relievers relax the muscles around the airways to open them up and make breathing easier. They bring fast relief from asthma symptoms.  How do I take them?  Relievers are breathed in through an inhaler or a nebulizer. Take them at the first sign of an asthma flare-up.  For informational purposes only. Not to replace the advice of your health care provider.    Copyright   2006 [facility]. All rights reserved. [employer] 606137 - REV 2/17.    Eating to Prevent Gout  Gout is a painful form of arthritis caused by an excess of uric acid. This is a waste product made by the body. It builds up in the body and forms crystals that collect in the joints, bringing on a gout attack. Alcohol and certain foods can trigger a gout attack. Below are some guidelines for changing your diet to help you manage gout. Your healthcare provider can work with you to determine the best eating plan for you. Know that diet is only one part of managing gout. Take your medicines as prescribed and follow the other guidelines your healthcare provider has given you.  Foods to limit  Eating too many foods containing purines may increase the levels of uric acid in your body and increase your risk for a gout attack. It may be best to limit these high-purine foods:    Alcohol (beer, red wine). You may be told to avoid alcohol completely.    Certain fish (anchovies, sardines, fish roes, herring, tuna, mussels, codfish, scallops, trout, and lon)    Certain meats (red meat, processed meat, feng, turkey, wild game, and goose)    Sauces and gravies made with meat    Organ meats (such as liver, kidneys, sweetbreads, and tripe)    Legumes (such as dried beans, peas)    Mushrooms, spinach, asparagus, and cauliflower    Yeast and yeast extract supplements  Foods to try  Some foods may be helpful for people with gout. You may want to try adding some of the following foods to your diet:    Dark berries: These include blueberries, blackberries, and cherries. These berries contain chemicals that may lower uric acid.    Tofu: Tofu, which is made from soy, is a good source of protein. Studies have shown that it may be a better choice than meat for people with gout.    Omega fatty acids: These acids are found in fatty fish (such as salmon), certain oils (such as flax, olive, or nut oils), or nuts. They  may help prevent inflammation due to gout.  The following guidelines are recommended by the American Medical Association for people with gout. Your diet should be:    High in fiber, whole grains, fruits, and vegetables.    Low in protein (15% of calories should come from protein. Choose lean sources such as soy, lean meats, and poultry).    Low in fat (no more than 30% of calories should come from fat, with only 10% coming from animal fat).   Date Last Reviewed: 6/17/2015 2000-2017 [employer]. [address] [place] [address], [place] [address]. All rights reserved. This information is not intended as a substitute for professional medical care. Always follow your healthcare professional's instructions.        Treating Gout Attacks     Raising the joint above the level of your heart can help reduce gout symptoms.     Gout is a disease that affects the joints. It is caused by excess uric acid in your blood stream that may lead to crystals forming in your joints. Left untreated, it can lead to painful foot and joint deformities and even kidney problems. But, by treating gout early, you can relieve pain and help prevent future problems. Gout can usually be treated with medication and proper diet. In severe cases, surgery may be needed.  Gout attacks are painful and often happen more than once. Taking medications may reduce pain and prevent attacks in the future. There are also some things you can do at home to relieve symptoms.  Medications for gout  Your healthcare provider may prescribe a daily medication to reduce levels of uric acid. Reducing your uric acid levels may help prevent gout attacks. Allopurinol is one commonly used medication taken daily to reduce uric acid levels. Other medications can help relieve pain and swelling during an acute attack. Medicines such as NSAIDs (nonsteroidal anti-inflammatory medicines), steroids, and colchicine may be prescribed for intermittent use to relieve an acute gout  attack. Be sure to take your medication as directed.  What you can do  Below are some things you can do at home to relieve gout symptoms. Your healthcare provider may have other tips.    Rest the painful joint as much as you can.    Raise the painful joint so it is at a level higher than your heart.    Use ice for 10 minutes every 1-2 hours as possible.  How can I prevent gout?  With a little effort, you may be able to prevent gout attacks in the future. Here are some things you can do:    Avoid foods high in purines    Certain meats (red meat, processed meat, turkey)    Organ meats (kidney, liver, sweetbread)    Shellfish (lobster, crab, shrimp, scallop, mussel)    Certain fish (anchovy, sardine, herring, mackerel)    Take any medications prescribed by your healthcare provider.    Lose weight if you need to.    Reduce high fructose corn syrup in meals and drinks.    Reduce or eliminate consumption of alcohol, particularly beer, but also red wine and spirits.    Control blood pressure, diabetes, and cholesterol.    Drink plenty of water to help flush uric acid from your body.  Date Last Reviewed: 2/1/2016 2000-2017 [employer]. [address] [place] [address]. All rights reserved. This information is not intended as a substitute for professional medical care. Always follow your healthcare professional's instructions.        Learning About Serving and Portion Sizes     A good rule of thumb: Devote half your plate to vegetables and green salad. Split the other half between protein and starchy carbohydrates. Fruit makes a good dessert.     Servings and portions. What s the difference? These terms can be very confusing. But learning to measure serving sizes can help you figure out how many carbohydrates ( carbs ) and other foods you eat each day. They are also powerful tools for managing your weight.  Servings and portions  Many different words are used to describe amounts of food. If your  health care provider uses a term you re not sure of, don t be afraid to ask. It helps to know the difference between servings and portions:    A serving size is a fixed size. Food producers use this term to describe their products. For example, the label on a cereal box could say that 1 cup of dry cereal = 1 serving.    A portion (also called a  helping ) is how much you eat or how much you put on your plate at a meal. For example, you might eat 2 cups of cereal at breakfast.  Using serving information  The portion you choose to eat (such as 2 cups of cereal) may be more than one serving as listed on the food label (such as 1 cup of cereal). That s why it helps to measure or weigh the food you eat. Because the food label values are based on servings, you ll need to know how many servings you eat at one sitting.     Ounces: 2 to 3 ounces is about the size of your palm.       1 Cup: 1 cup (or a medium-sized piece) is about the size of your fist.       1/2 Cup: 1/2 cup is about the size of your cupped hand.      One tablespoon is about the size of your thumb.  One teaspoon is about the size of the tip of your thumb.  Keeping track of serving sizes  When you re planning for a snack or a meal, keep servings in mind. If you don t have measuring cups or a scale handy, there are ways to  eyeball  serving sizes, such as comparing your food to the size of your hand (see pictures above).  Managing portion sizes  If your weight is a concern, reducing your portions can help. You can eat more than one serving of a food at once. But to keep from eating too much at one meal, learn how to manage your portions. A portion is the amount of each type of food on your plate. See the plate diagram for an example of balanced portions.  Date Last Reviewed: 3/1/2016    2009-3230 [employer]. [address] [place] [address], [place] [address]. All rights reserved. This information is not intended as a substitute for professional medical  "care. Always follow your healthcare professional's instructions.                Follow-ups after your visit        Additional Services     NUTRITION REFERRAL       Your provider has referred you to: [facility]: [facility] [phone]   [email or web address]    Please be aware that coverage of these services is subject to the terms and limitations of your health insurance plan.  Call member services at your health plan with any benefit or coverage questions.      Please bring the following with you to your appointment:    (1) This referral request  (2) Any documents given to you regarding this referral  (3) Any specific questions you have about diet and/or food choices                  Who to contact     If you have questions or need follow up information about today's clinic visit or your schedule please contact [facility] directly at [phone].  Normal or non-critical lab and imaging results will be communicated to you by MyChart, letter or phone within 4 business days after the clinic has received the results. If you do not hear from us within 7 days, please contact the clinic through MyChart or phone. If you have a critical or abnormal lab result, we will notify you by phone as soon as possible.  Submit refill requests through [employer] or call your pharmacy and they will forward the refill request to us. Please allow 3 business days for your refill to be completed.          Additional Information About Your Visit        MyChart Information     [employer] lets you send messages to your doctor, view your test results, renew your prescriptions, schedule appointments and more. To sign up, go to [email or web address] . Click on \"Log in\" on the left side of the screen, which will take you to the Welcome page. Then click on \"Sign up Now\" on the right side of the page.     You will be asked to enter the access code listed below, as well as some personal information. " Please follow the directions to create your username and password.     Your access code is: [email or web address]  Expires: 2017 11:19 AM     Your access code will  in 90 days. If you need help or a new code, please call your [place] clinic or [phone].        Care EveryWhere ID     This is your Care EveryWhere ID. This could be used by other organizations to access your [place] medical records  [id number]        Your Vitals Were     Pulse Temperature Pulse Oximetry BMI (Body Mass Index)          65 97.9  F (36.6  C) (Tympanic) 97% 25.44 kg/m2         Blood Pressure from Last 3 Encounters:   17 120/68   17 120/78   17 122/64    Weight from Last 3 Encounters:   17 160 lb (72.6 kg)   17 172 lb (78 kg)   17 163 lb 6.4 oz (74.1 kg)              We Performed the Following     NUTRITION REFERRAL          Today's Medication Changes          These changes are accurate as of: 17 11:21 AM.  If you have any questions, ask your nurse or doctor.               These medicines have changed or have updated prescriptions.        Dose/Directions    diclofenac 50 MG EC tablet   Commonly known as:  VOLTAREN   This may have changed:  See the new instructions.   Used for:  Acute idiopathic gout of right foot   Changed by:  Maria Dolores Krishnamurthy APRN CNP        TAKE ONE TABLET BY MOUTH THREE TIMES A DAY AS NEEDED FOR MODERATE PAIN   Quantity:  90 tablet   Refills:  3            Where to get your medicines      These medications were sent to [facility] [address]  [address] [facility] [address]     Phone:  [phone]     diclofenac 50 MG EC tablet                Primary Care Provider Office Phone # Fax #    MYRA Craig [phone]       [address] [place] [address]        Equal Access to Services     AGUEDA GASTELUM AH: Pepe conroy Soalexa, waaxda luqadaha, qaybta kaalsanaz padron  keven tamrarene charleschristopher frey ah. So [facility] [phone].    ATENCIÓN: Si beltranla berny, tiene a cordova disposición servicios gratuitos de asistencia lingüística. Tanisha breaux [phone].    We comply with applicable federal civil rights laws and [place] laws. We do not discriminate on the basis of race, color, national origin, age, disability sex, sexual orientation or gender identity.            Thank you!     Thank you for choosing [facility]  for your care. Our goal is always to provide you with excellent care. Hearing back from our patients is one way we can continue to improve our services. Please take a few minutes to complete the written survey that you may receive in the mail after your visit with us. Thank you!             Your Updated Medication List - Protect others around you: Learn how to safely use, store and throw away your medicines at www.disposemymeds.org.          This list is accurate as of: 9/26/17 11:21 AM.  Always use your most recent med list.                   Brand Name Dispense Instructions for use Diagnosis    albuterol 108 (90 BASE) MCG/ACT Inhaler    PROAIR HFA/PROVENTIL HFA/VENTOLIN HFA    3 Inhaler    Inhale 2 puffs into the lungs every 4 hours as needed for shortness of breath / dyspnea.    Intermittent asthma       allopurinol 100 MG tablet    ZYLOPRIM    90 tablet    TAKE ONE TABLET BY MOUTH EVERY DAY    Idiopathic chronic gout of multiple sites without tophus       aspirin 81 MG EC tablet      Take 1 tablet (81 mg) by mouth daily    Cerebrovascular accident (CVA), unspecified mechanism (H)       atorvastatin 40 MG tablet    LIPITOR    90 tablet    TAKE ONE TABLET BY MOUTH EVERY DAY    Occipital stroke (H), Thalamic infarct, acute (H)       BENADRYL PO      Take 25 mg by mouth every 6 hours as needed for itching        diclofenac 50 MG EC tablet    VOLTAREN    90 tablet    TAKE ONE TABLET BY MOUTH THREE TIMES A DAY AS NEEDED FOR MODERATE PAIN    Acute idiopathic gout of  right foot       HERBALS           STRESS B COMPLEX/IRON Tabs     100 tablet    Take 1 tablet by mouth daily

## 2017-09-26 NOTE — PROGRESS NOTES
SUBJECTIVE:   Sen Watkins is a 56 year old male who presents to clinic today for the following health issues:      Gout follow up.   Chronic muscle pain since previous stroke.        Type / Location of Pain: arm, shoulder and right side  Analgesia/pain control:       Recent changes:  improved      Overall control: adequate pain control  Activity level/function:      Daily activities:  Able to do light housework, cooking    Work:  Unable to work  Adverse effects:  No  Adherance    Taking medication as directed?  Yes    Participating in other treatments: not applicable  Risk Factors:    Sleep:  Good    Mood/anxiety:  When can't go outside and do things, mood gets worse     Recent family or social stressors:  none noted    Other aggravating factors: none  PHQ-9 SCORE 6/7/2012   Total Score 5     LADY-7 SCORE 6/7/2012   Total Score 4     Encounter-Level CSA:     There are no encounter-level csa.            Amount of exercise or physical activity: couple hours at a time at work     Problems taking medications regularly: No    Medication side effects: none  Diet: would like to talk about getting some diet information. Doesn't feel like getting enough nutritional supplement during the day     Right arm and shoulder pain and right anterior chest wall lower pain  Makes life bearable.   Still having some weakness on the right side.   Fine finger or writing becomes a challenge.   Can lift but fine finger dexterity is hard.   Hard to rake.   Not able to handle the hot weather yet due to increased fatigue.     Moods are better when eating healthy.   Never felt calm in his life. Diet has really improved.. Processed foods avoidance.   Avoids greasy foods. quiona felt nourished. Vomiting better with this.    2 previous nutritionist referrals generated this year. Patient has not had follow up yet as requested.       Taking magnesium, potassium and zinc in the AM.  Asthma markedly better. No recent flare.     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     Problem list and histories reviewed & adjusted, as indicated.  Additional history: as documented    Patient Active Problem List   Diagnosis     CARDIOVASCULAR SCREENING; LDL GOAL LESS THAN 130     Intermittent asthma     Nephrolithiasis     Advanced directives, counseling/discussion     Acute ischemic stroke (H)     Acute idiopathic gout of right foot     Screening for prostate cancer     Occipital stroke (H)     Thalamic infarct, acute (H)     Situational anxiety     Osteoarthritis of right foot, unspecified osteoarthritis type     History reviewed. No pertinent surgical history.    Social History   Substance Use Topics     Smoking status: Former Smoker     Types: Cigarettes     Quit date: 4/26/2012     Smokeless tobacco: Former User     Alcohol use No     Family History   Problem Relation Age of Onset     Coronary Artery Disease Mother      CEREBROVASCULAR DISEASE Brother      Psychotic Disorder Sister      Psychotic Disorder Brother          Labs reviewed in EPIC      Reviewed and updated as needed this visit by clinical staffTobacco  Allergies  Med Hx  Surg Hx  Fam Hx  Soc Hx      Reviewed and updated as needed this visit by Provider         ROS:   ROS: 10 point ROS neg other than the symptoms noted above in the HPI.      OBJECTIVE:                                                    /68 (BP Location: Left arm)  Pulse 65  Temp 97.9  F (36.6  C) (Tympanic)  Wt 160 lb (72.6 kg)  SpO2 97%  BMI 25.44 kg/m2  Body mass index is 25.44 kg/(m^2).   GENERAL: healthy, alert, well nourished, well hydrated, no distress  HENT: ear canals- normal; TMs- normal; Nose- normal; Mouth- no ulcers, no lesions  NECK: no tenderness, no adenopathy, no asymmetry, no masses, no stiffness; thyroid- normal to palpation  RESP: lungs clear to auscultation - no rales, no rhonchi, no wheezes  CV: regular rates and rhythm, normal S1 S2, no S3 or S4 and no murmur, no click or rub  -  ABDOMEN: soft, no tenderness, no  hepatosplenomegaly, no masses, normal bowel sounds  MS: extremities- no gross deformities noted, no edema  NEURO: strength and tone- normal, sensory exam- grossly normal, mentation- intact, speech- normal, reflexes- symmetric   PSYCH: Alert and oriented times 3; speech- coherent , normal rate and volume; able to articulate logical thoughts, able to abstract reason, no tangential thoughts, no hallucinations or delusions, affect- normal    Diagnostic test results:  Results for orders placed or performed during the hospital encounter of 05/04/17   US Abdomen Complete    Narrative    US ABDOMEN COMPLETE 5/4/2017 7:44 AM    HISTORY: Right upper quadrant pain, worse after eating greasy food.    TECHNIQUE: Routine assessed structures include the gallbladder, bile  ducts, liver, pancreas, kidneys, and spleen size. Also assessed are  the abdominal aorta and hepatic portion of the IVC.    COMPARISON: None.    FINDINGS: No gallstones, gallbladder wall thickening, or biliary  dilatation are demonstrated. The liver shows no focal finding.  Portions of the tail of the pancreas are obscured by overlying bowel  gas. The spleen is not enlarged. The kidneys are within normal limits.  The abdominal aorta is not aneurysmal.        Impression    IMPRESSION:  The abdominal ultrasound shows no gallstones or other  specific reason for pain.        RACHEL CHRISTINE MD          ASSESSMENT/PLAN:                                                    1. Acute idiopathic gout of right foot  Continue NSAID as needed for pain.  Continue allopurinol  Uric acid level due January 2018  - diclofenac (VOLTAREN) 50 MG EC tablet; TAKE ONE TABLET BY MOUTH THREE TIMES A DAY AS NEEDED FOR MODERATE PAIN  Dispense: 90 tablet; Refill: 3    2. Food intolerance in adult  Discussed various options.  Again requests referral to nutritionist.   Phone number given.   - NUTRITION REFERRAL    3. Intermittent asthma, uncomplicated  Stable  ACT reviewed.   albuteral 2 puffs Q 4 hours prn wheeze, cough, Shortness of breath.       Follow up with Provider - Call or return to the clinic with any worsening of symptoms or no resolution. Patient/Parent verbalized understanding and is in agreement. Medication side effects reviewed.   Current Outpatient Prescriptions   Medication Sig Dispense Refill     diclofenac (VOLTAREN) 50 MG EC tablet TAKE ONE TABLET BY MOUTH THREE TIMES A DAY AS NEEDED FOR MODERATE PAIN 90 tablet 3     allopurinol (ZYLOPRIM) 100 MG tablet TAKE ONE TABLET BY MOUTH EVERY DAY 90 tablet 1     atorvastatin (LIPITOR) 40 MG tablet TAKE ONE TABLET BY MOUTH EVERY DAY 90 tablet 2     HERBALS        Multiple Vitamins-Iron (STRESS B COMPLEX/IRON) TABS Take 1 tablet by mouth daily 100 tablet 3     aspirin EC 81 MG EC tablet Take 1 tablet (81 mg) by mouth daily       DiphenhydrAMINE HCl (BENADRYL PO) Take 25 mg by mouth every 6 hours as needed for itching       [DISCONTINUED] diclofenac (VOLTAREN) 50 MG EC tablet TAKE ONE TABLET BY MOUTH THREE TIMES A DAY AS NEEDED FOR MODERATE PAIN 90 tablet 0     albuterol (PROAIR HFA, PROVENTIL HFA, VENTOLIN HFA) 108 (90 BASE) MCG/ACT inhaler Inhale 2 puffs into the lungs every 4 hours as needed for shortness of breath / dyspnea. (Patient not taking: Reported on 9/26/2017) 3 Inhaler 3        See Patient Instructions    [facility]

## 2017-09-26 NOTE — PATIENT INSTRUCTIONS
Asthma Medicines  Controllers and relievers  Controller medicines   Medicines that help control asthma and prevent symptoms are called controllers. They work over time--they will not relieve symptoms quickly. Some people take more than one controller.   Names of the controllers you take:   ________________________________________  ________________________________________  How do they work?  Controllers help prevent asthma attacks. They take down swelling, relax airway muscles and reduce mucus over time. This keeps your airways open. They also block your body's response to asthma triggers (things that cause asthma symptoms).  How do I take them?  Some controllers are taken by mouth. Others are breathed in through an inhaler.  Your doctor will tell you how to take your medicine. Be sure to take it every day, at the same times each day.  Reliever medicines   Medicines that relieve an asthma flare-up quickly are called relievers. Some people take more than one reliever.   Names of the relievers you use:   ________________________________________  ________________________________________  How do they work?  Relievers relax the muscles around the airways to open them up and make breathing easier. They bring fast relief from asthma symptoms.  How do I take them?  Relievers are breathed in through an inhaler or a nebulizer. Take them at the first sign of an asthma flare-up.  For informational purposes only. Not to replace the advice of your health care provider.   Copyright   2006 [employer] [employer] [facility]. All rights reserved. CSID [id number] - REV 2/17.    Eating to Prevent Gout  Gout is a painful form of arthritis caused by an excess of uric acid. This is a waste product made by the body. It builds up in the body and forms crystals that collect in the joints, bringing on a gout attack. Alcohol and certain foods can trigger a gout attack. Below are some guidelines for changing your diet to help you manage gout. Your  healthcare provider can work with you to determine the best eating plan for you. Know that diet is only one part of managing gout. Take your medicines as prescribed and follow the other guidelines your healthcare provider has given you.  Foods to limit  Eating too many foods containing purines may increase the levels of uric acid in your body and increase your risk for a gout attack. It may be best to limit these high-purine foods:    Alcohol (beer, red wine). You may be told to avoid alcohol completely.    Certain fish (anchovies, sardines, fish roes, herring, tuna, mussels, codfish, scallops, trout, and lon)    Certain meats (red meat, processed meat, feng, turkey, wild game, and goose)    Sauces and gravies made with meat    Organ meats (such as liver, kidneys, sweetbreads, and tripe)    Legumes (such as dried beans, peas)    Mushrooms, spinach, asparagus, and cauliflower    Yeast and yeast extract supplements  Foods to try  Some foods may be helpful for people with gout. You may want to try adding some of the following foods to your diet:    Dark berries: These include blueberries, blackberries, and cherries. These berries contain chemicals that may lower uric acid.    Tofu: Tofu, which is made from soy, is a good source of protein. Studies have shown that it may be a better choice than meat for people with gout.    Omega fatty acids: These acids are found in fatty fish (such as salmon), certain oils (such as flax, olive, or nut oils), or nuts. They may help prevent inflammation due to gout.  The following guidelines are recommended by the American Medical Association for people with gout. Your diet should be:    High in fiber, whole grains, fruits, and vegetables.    Low in protein (15% of calories should come from protein. Choose lean sources such as soy, lean meats, and poultry).    Low in fat (no more than 30% of calories should come from fat, with only 10% coming from animal fat).   Date Last Reviewed:  6/17/2015 2000-2017 [employer]. [address] [place] [address], [place] [address]. All rights reserved. This information is not intended as a substitute for professional medical care. Always follow your healthcare professional's instructions.        Treating Gout Attacks     Raising the joint above the level of your heart can help reduce gout symptoms.     Gout is a disease that affects the joints. It is caused by excess uric acid in your blood stream that may lead to crystals forming in your joints. Left untreated, it can lead to painful foot and joint deformities and even kidney problems. But, by treating gout early, you can relieve pain and help prevent future problems. Gout can usually be treated with medication and proper diet. In severe cases, surgery may be needed.  Gout attacks are painful and often happen more than once. Taking medications may reduce pain and prevent attacks in the future. There are also some things you can do at home to relieve symptoms.  Medications for gout  Your healthcare provider may prescribe a daily medication to reduce levels of uric acid. Reducing your uric acid levels may help prevent gout attacks. Allopurinol is one commonly used medication taken daily to reduce uric acid levels. Other medications can help relieve pain and swelling during an acute attack. Medicines such as NSAIDs (nonsteroidal anti-inflammatory medicines), steroids, and colchicine may be prescribed for intermittent use to relieve an acute gout attack. Be sure to take your medication as directed.  What you can do  Below are some things you can do at home to relieve gout symptoms. Your healthcare provider may have other tips.    Rest the painful joint as much as you can.    Raise the painful joint so it is at a level higher than your heart.    Use ice for 10 minutes every 1-2 hours as possible.  How can I prevent gout?  With a little effort, you may be able to prevent gout attacks in the future. Here are  some things you can do:    Avoid foods high in purines    Certain meats (red meat, processed meat, turkey)    Organ meats (kidney, liver, sweetbread)    Shellfish (lobster, crab, shrimp, scallop, mussel)    Certain fish (anchovy, sardine, herring, mackerel)    Take any medications prescribed by your healthcare provider.    Lose weight if you need to.    Reduce high fructose corn syrup in meals and drinks.    Reduce or eliminate consumption of alcohol, particularly beer, but also red wine and spirits.    Control blood pressure, diabetes, and cholesterol.    Drink plenty of water to help flush uric acid from your body.  Date Last Reviewed: 2/1/2016 2000-2017 [employer]. [address] [place] [address], [place] [address]. All rights reserved. This information is not intended as a substitute for professional medical care. Always follow your healthcare professional's instructions.        Learning About Serving and Portion Sizes     A good rule of thumb: Devote half your plate to vegetables and green salad. Split the other half between protein and starchy carbohydrates. Fruit makes a good dessert.     Servings and portions. What s the difference? These terms can be very confusing. But learning to measure serving sizes can help you figure out how many carbohydrates ( carbs ) and other foods you eat each day. They are also powerful tools for managing your weight.  Servings and portions  Many different words are used to describe amounts of food. If your health care provider uses a term you re not sure of, don t be afraid to ask. It helps to know the difference between servings and portions:    A serving size is a fixed size. Food producers use this term to describe their products. For example, the label on a cereal box could say that 1 cup of dry cereal = 1 serving.    A portion (also called a  helping ) is how much you eat or how much you put on your plate at a meal. For example, you might eat 2 cups of cereal at  breakfast.  Using serving information  The portion you choose to eat (such as 2 cups of cereal) may be more than one serving as listed on the food label (such as 1 cup of cereal). That s why it helps to measure or weigh the food you eat. Because the food label values are based on servings, you ll need to know how many servings you eat at one sitting.     Ounces: 2 to 3 ounces is about the size of your palm.       1 Cup: 1 cup (or a medium-sized piece) is about the size of your fist.       1/2 Cup: 1/2 cup is about the size of your cupped hand.      One tablespoon is about the size of your thumb.  One teaspoon is about the size of the tip of your thumb.  Keeping track of serving sizes  When you re planning for a snack or a meal, keep servings in mind. If you don t have measuring cups or a scale handy, there are ways to  eyeball  serving sizes, such as comparing your food to the size of your hand (see pictures above).  Managing portion sizes  If your weight is a concern, reducing your portions can help. You can eat more than one serving of a food at once. But to keep from eating too much at one meal, learn how to manage your portions. A portion is the amount of each type of food on your plate. See the plate diagram for an example of balanced portions.  Date Last Reviewed: 3/1/2016    3397-6874 [employer]. [address] [place] [address], [place] [address]. All rights reserved. This information is not intended as a substitute for professional medical care. Always follow your healthcare professional's instructions.

## 2017-09-26 NOTE — NURSING NOTE
"Chief Complaint   Patient presents with     Pain     med refill        Initial /68 (BP Location: Left arm)  Pulse 65  Temp 97.9  F (36.6  C) (Tympanic)  Wt 160 lb (72.6 kg)  SpO2 97%  BMI 25.44 kg/m2 Estimated body mass index is 25.44 kg/(m^2) as calculated from the following:    Height as of 5/1/17: 5' 6.5\" (1.689 m).    Weight as of this encounter: 160 lb (72.6 kg).  Medication Reconciliation: complete    "

## 2017-10-04 ASSESSMENT — ASTHMA QUESTIONNAIRES: ACT_TOTALSCORE: 25

## 2017-10-08 DIAGNOSIS — M10.071 ACUTE IDIOPATHIC GOUT OF RIGHT FOOT: ICD-10-CM

## 2017-10-09 NOTE — TELEPHONE ENCOUNTER
diclofenac (VOLTAREN) 50 MG EC tablet      Last Written Prescription Date: 09/26/2017  Last Quantity: 90 tablet, # refills: 3  Last Office Visit with G, P or [facility] prescribing provider: 09/26/2017       Creatinine   Date Value Ref Range Status   02/02/2017 0.91 0.66 - 1.25 mg/dL Final     Lab Results   Component Value Date    AST 26 05/01/2017     Lab Results   Component Value Date    ALT 59 05/01/2017     BP Readings from Last 3 Encounters:   09/26/17 120/68   05/01/17 120/78   03/21/17 122/64

## 2017-10-10 NOTE — TELEPHONE ENCOUNTER
diclofenac (VOLTAREN) 50 MG EC tablet 90 tablet 3 9/26/2017  No      Sig: TAKE ONE TABLET BY MOUTH THREE TIMES A DAY AS NEEDED FOR MODERATE PAIN     Class: E-Prescribe     Order: [id number]     E-Prescribing Status: Receipt confirmed by pharmacy (9/26/2017 11:14 AM CDT)     Filled on 9/26/2017 with 3 refills.   Should have plenty of refills yet.   Please contact the pharmacy to confirm.  Thanks Maria Dolores JEFFERSON-BC

## 2017-10-12 ENCOUNTER — HOSPITAL ENCOUNTER (EMERGENCY)
Facility: CLINIC | Age: 57
Discharge: HOME | End: 2017-10-12
Attending: EMERGENCY MEDICINE | Admitting: EMERGENCY MEDICINE
Payer: COMMERCIAL

## 2017-10-12 ENCOUNTER — TELEPHONE (OUTPATIENT)
Dept: FAMILY MEDICINE | Facility: CLINIC | Age: 57
End: 2017-10-12

## 2017-10-12 ENCOUNTER — APPOINTMENT (OUTPATIENT)
Dept: CT IMAGING | Facility: CLINIC | Age: 57
End: 2017-10-12
Attending: EMERGENCY MEDICINE
Payer: COMMERCIAL

## 2017-10-12 VITALS
SYSTOLIC BLOOD PRESSURE: 121 MMHG | TEMPERATURE: 98.5 F | WEIGHT: 162 LBS | BODY MASS INDEX: 25.76 KG/M2 | DIASTOLIC BLOOD PRESSURE: 85 MMHG | OXYGEN SATURATION: 99 % | HEART RATE: 60 BPM | RESPIRATION RATE: 16 BRPM

## 2017-10-12 DIAGNOSIS — F41.9 ANXIETY: ICD-10-CM

## 2017-10-12 DIAGNOSIS — R50.9 FEVER, UNSPECIFIED FEVER CAUSE: ICD-10-CM

## 2017-10-12 DIAGNOSIS — R10.11 ABDOMINAL PAIN, RIGHT UPPER QUADRANT: ICD-10-CM

## 2017-10-12 LAB
ALBUMIN SERPL-MCNC: 3.6 G/DL (ref 3.4–5)
ALP SERPL-CCNC: 136 U/L (ref 40–150)
ALT SERPL W P-5'-P-CCNC: 92 U/L (ref 0–70)
ANION GAP SERPL CALCULATED.3IONS-SCNC: 6 MMOL/L (ref 3–14)
AST SERPL W P-5'-P-CCNC: 51 U/L (ref 0–45)
BASOPHILS # BLD AUTO: 0 10E9/L (ref 0–0.2)
BASOPHILS NFR BLD AUTO: 0.5 %
BILIRUB SERPL-MCNC: 0.5 MG/DL (ref 0.2–1.3)
BUN SERPL-MCNC: 24 MG/DL (ref 7–30)
CALCIUM SERPL-MCNC: 8.7 MG/DL (ref 8.5–10.1)
CHLORIDE SERPL-SCNC: 109 MMOL/L (ref 94–109)
CO2 SERPL-SCNC: 26 MMOL/L (ref 20–32)
CREAT SERPL-MCNC: 0.95 MG/DL (ref 0.66–1.25)
DIFFERENTIAL METHOD BLD: NORMAL
EOSINOPHIL # BLD AUTO: 0.4 10E9/L (ref 0–0.7)
EOSINOPHIL NFR BLD AUTO: 4.7 %
ERYTHROCYTE [DISTWIDTH] IN BLOOD BY AUTOMATED COUNT: 14.9 % (ref 10–15)
GFR SERPL CREATININE-BSD FRML MDRD: 82 ML/MIN/1.7M2
GLUCOSE SERPL-MCNC: 83 MG/DL (ref 70–99)
HCT VFR BLD AUTO: 44.2 % (ref 40–53)
HGB BLD-MCNC: 15.1 G/DL (ref 13.3–17.7)
IMM GRANULOCYTES # BLD: 0 10E9/L (ref 0–0.4)
IMM GRANULOCYTES NFR BLD: 0.2 %
LIPASE SERPL-CCNC: 129 U/L (ref 73–393)
LYMPHOCYTES # BLD AUTO: 2.4 10E9/L (ref 0.8–5.3)
LYMPHOCYTES NFR BLD AUTO: 30 %
MCH RBC QN AUTO: 30.2 PG (ref 26.5–33)
MCHC RBC AUTO-ENTMCNC: 34.2 G/DL (ref 31.5–36.5)
MCV RBC AUTO: 88 FL (ref 78–100)
MONOCYTES # BLD AUTO: 0.8 10E9/L (ref 0–1.3)
MONOCYTES NFR BLD AUTO: 9.9 %
NEUTROPHILS # BLD AUTO: 4.4 10E9/L (ref 1.6–8.3)
NEUTROPHILS NFR BLD AUTO: 54.7 %
PLATELET # BLD AUTO: 250 10E9/L (ref 150–450)
POTASSIUM SERPL-SCNC: 4 MMOL/L (ref 3.4–5.3)
PROT SERPL-MCNC: 7.2 G/DL (ref 6.8–8.8)
RBC # BLD AUTO: 5 10E12/L (ref 4.4–5.9)
SODIUM SERPL-SCNC: 141 MMOL/L (ref 133–144)
WBC # BLD AUTO: 8.1 10E9/L (ref 4–11)

## 2017-10-12 PROCEDURE — 74177 CT ABD & PELVIS W/CONTRAST: CPT

## 2017-10-12 PROCEDURE — 25000125 ZZHC RX 250: Performed by: EMERGENCY MEDICINE

## 2017-10-12 PROCEDURE — 25000128 H RX IP 250 OP 636: Performed by: EMERGENCY MEDICINE

## 2017-10-12 PROCEDURE — 85025 COMPLETE CBC W/AUTO DIFF WBC: CPT | Performed by: EMERGENCY MEDICINE

## 2017-10-12 PROCEDURE — 80053 COMPREHEN METABOLIC PANEL: CPT | Performed by: EMERGENCY MEDICINE

## 2017-10-12 PROCEDURE — 83690 ASSAY OF LIPASE: CPT | Performed by: EMERGENCY MEDICINE

## 2017-10-12 PROCEDURE — 96374 THER/PROPH/DIAG INJ IV PUSH: CPT | Performed by: EMERGENCY MEDICINE

## 2017-10-12 PROCEDURE — 99285 EMERGENCY DEPT VISIT HI MDM: CPT | Mod: 25 | Performed by: EMERGENCY MEDICINE

## 2017-10-12 PROCEDURE — 99285 EMERGENCY DEPT VISIT HI MDM: CPT | Mod: Z6 | Performed by: EMERGENCY MEDICINE

## 2017-10-12 PROCEDURE — 96361 HYDRATE IV INFUSION ADD-ON: CPT | Performed by: EMERGENCY MEDICINE

## 2017-10-12 RX ORDER — KETOROLAC TROMETHAMINE 30 MG/ML
30 INJECTION, SOLUTION INTRAMUSCULAR; INTRAVENOUS ONCE
Status: COMPLETED | OUTPATIENT
Start: 2017-10-12 | End: 2017-10-12

## 2017-10-12 RX ORDER — SODIUM CHLORIDE 9 MG/ML
1000 INJECTION, SOLUTION INTRAVENOUS CONTINUOUS
Status: DISCONTINUED | OUTPATIENT
Start: 2017-10-12 | End: 2017-10-13 | Stop reason: HOSPADM

## 2017-10-12 RX ORDER — IOPAMIDOL 755 MG/ML
80 INJECTION, SOLUTION INTRAVASCULAR ONCE
Status: COMPLETED | OUTPATIENT
Start: 2017-10-12 | End: 2017-10-12

## 2017-10-12 RX ADMIN — KETOROLAC TROMETHAMINE 30 MG: 30 INJECTION, SOLUTION INTRAMUSCULAR at 20:57

## 2017-10-12 RX ADMIN — SODIUM CHLORIDE 1000 ML: 9 INJECTION, SOLUTION INTRAVENOUS at 20:57

## 2017-10-12 RX ADMIN — IOPAMIDOL 80 ML: 755 INJECTION, SOLUTION INTRAVENOUS at 21:11

## 2017-10-12 RX ADMIN — SODIUM CHLORIDE 60 ML: 9 INJECTION, SOLUTION INTRAVENOUS at 21:12

## 2017-10-12 NOTE — ED AVS SNAPSHOT
[facility] Emergency Department    [address] [address]    Phone:  [phone]    Fax:  [phone]                                       Sen Watkins   MRN: [id number]    Department:  [facility] Emergency Department   Date of Visit:  10/12/2017           After Visit Summary Signature Page     I have received my discharge instructions, and my questions have been answered. I have discussed any challenges I see with this plan with the nurse or doctor.    ..........................................................................................................................................  Patient/Patient Representative Signature      ..........................................................................................................................................  Patient Representative Print Name and Relationship to Patient    ..................................................               ................................................  Date                                            Time    ..........................................................................................................................................  Reviewed by Signature/Title    ...................................................              ..............................................  Date                                                            Time

## 2017-10-12 NOTE — TELEPHONE ENCOUNTER
Reason for call:  Patient reporting a symptom    Symptom or request: Nico says he has been having lower R side back pain for a couple days and abdominal pain that comes and goes. He has not checked temp but says he has felt warm and cold. No difficulty with urination and is drinking a lot of water. He wonders if he should be seen. He feels like he would like to get up and move around but this is preventing him from doing this.   Duration (how long have symptoms been present): A few days    Phone Number patient can be reached at:  Home number on file [phone] (home)    Best Time:  anytime    Can we leave a detailed message on this number:  YES    Call taken on 10/12/2017 at 2:14 PM by Bibiana Sen

## 2017-10-12 NOTE — TELEPHONE ENCOUNTER
Pt c/o headache, chills, lower right side/abdomin pain. States also feels very emotional the last few days.  Tearful on phone. Pt is afraid he is having another stroke. Advised to go to the emergency room to be evaluated. Pt verbalized understanding. Sharmin Royal RN

## 2017-10-12 NOTE — ED AVS SNAPSHOT
[facility]    [address] [facility] [address]    Phone:  [phone]    Fax:  [phone]                                       Sen Watkins   MRN: [id number]    Department:  [facility]   Date of Visit:  10/12/2017           Patient Information     Date Of Birth          1960        Your diagnoses for this visit were:     Abdominal pain, right upper quadrant     Anxiety     Fever, unspecified fever cause        You were seen by Lavelle Leyva MD.        Discharge Instructions       Labs and CT were normal.          Abdominal Pain    Abdominal pain is pain in the stomach or belly area. Everyone has this pain from time to time. In many cases it goes away on its own. But abdominal pain can sometimes be due to a serious problem, such as appendicitis. So it s important to know when to seek help.  Causes of abdominal pain  There are many possible causes of abdominal pain. Common causes in adults include:    Constipation, diarrhea, or gas    Stomach acid flowing back up into the esophagus (acid reflux or heartburn)    Severe acid reflux, called GERD (gastroesophageal reflux disease)    A sore in the lining of the stomach or small intestine (peptic ulcer)    Inflammation of the gallbladder, liver, or pancreas    Gallstones or kidney stones    Appendicitis     Intestinal blockage     An internal organ pushing through a muscle or other tissue (hernia)    Urinary tract infections    In women, menstrual cramps, fibroids, or endometriosis    Inflammation or infection of the intestines  Diagnosing the cause of abdominal pain  Your healthcare provider will do a physical exam help find the cause of your pain. If needed, tests will be ordered. Belly pain has many possible causes. So it can be hard to find the reason for your pain. Giving details about your pain can help. Tell your provider where and when you feel the pain, and what makes it better  or worse. Also let your provider know if you have other symptoms such as:    Fever    Tiredness    Upset stomach (nausea)    Vomiting    Changes in bathroom habits  Treating abdominal pain  Some causes of pain need emergency medical treatment right away. These include appendicitis or a bowel blockage. Other problems can be treated with rest, fluids, or medicines. Your healthcare provider can give you specific instructions for treatment or self-care based on what is causing your pain.  If you have vomiting or diarrhea, sip water or other clear fluids. When you are ready to eat solid foods again, start with small amounts of easy-to-digest, low-fat foods. These include apple sauce, toast, or crackers.   When to seek medical care  Call 911 or go to the hospital right away if you:    Can t pass stool and are vomiting    Are vomiting blood or have bloody diarrhea or black, tarry diarrhea    Have chest, neck, or shoulder pain    Feel like you might pass out    Have pain in your shoulder blades with nausea    Have sudden, severe belly pain    Have new, severe pain unlike any you have felt before    Have a belly that is rigid, hard, and tender to touch  Call your healthcare provider if you have:    Pain for more than 5 days    Bloating for more than 2 days    Diarrhea for more than 5 days    A fever of 100.4 F (38.0 C) or higher, or as directed by your provider    Pain that gets worse    Weight loss for no reason    Continued lack of appetite    Blood in your stool  How to prevent abdominal pain  Here are some tips to help prevent abdominal pain:    Eat smaller amounts of food at one time.    Avoid greasy, fried, or other high-fat foods.    Avoid foods that give you gas.    Exercise regularly.    Drink plenty of fluids.  To help prevent GERD symptoms:    Quit smoking.    Reduce alcohol and certain foods that increase stomach acid.    Avoid aspirin and over-the-counter pain and fever medicines (NSAIDS or nonsteroidal  anti-inflammatory drugs), if possible    Lose extra weight.    Finish eating at least 2 hours before you go to bed or lie down.    Raise the head of your bed.  Date Last Reviewed: 7/1/2016 2000-2017 [employer]. [address] [place] [address], [place] [address]. All rights reserved. This information is not intended as a substitute for professional medical care. Always follow your healthcare professional's instructions.          Future Appointments        Provider Department Dept Phone Center    10/17/2017 10:00 AM Carolina Metzger RD [facility] [phone] [facility]      24 Hour Appointment Hotline       To make an appointment at any [facility] clinic, call [phone] ([phone]). If you don't have a family doctor or clinic, we will help you find one. [facility] clinics are conveniently located to serve the needs of you and your family.             Review of your medicines      Our records show that you are taking the medicines listed below. If these are incorrect, please call your family doctor or clinic.        Dose / Directions Last dose taken    allopurinol 100 MG tablet   Commonly known as:  ZYLOPRIM   Quantity:  90 tablet        TAKE ONE TABLET BY MOUTH EVERY DAY   Refills:  1        aspirin 81 MG EC tablet   Dose:  81 mg        Take 1 tablet (81 mg) by mouth daily   Refills:  0        atorvastatin 40 MG tablet   Commonly known as:  LIPITOR   Quantity:  90 tablet        TAKE ONE TABLET BY MOUTH EVERY DAY   Refills:  2        diclofenac 50 MG EC tablet   Commonly known as:  VOLTAREN   Quantity:  90 tablet        TAKE ONE TABLET BY MOUTH THREE TIMES A DAY AS NEEDED FOR MODERATE PAIN   Refills:  0        HERBALS   Indication:  L-Avsinine        Refills:  0        STRESS B COMPLEX/IRON Tabs   Dose:  1 tablet   Quantity:  100 tablet        Take 1 tablet by mouth daily   Refills:  3                Procedures and tests performed during your visit     CBC with platelets  differential    CT Abdomen Pelvis w Contrast    Comprehensive metabolic panel    Give 20 ounces of water 15 minutes before CT of abdomen    Lipase    Peripheral IV: Standard      Orders Needing Specimen Collection     None      Pending Results     No orders found from 10/10/2017 to 10/13/2017.            Pending Culture Results     No orders found from 10/10/2017 to 10/13/2017.            Pending Results Instructions     If you had any lab results that were not finalized at the time of your Discharge, you can call the ED Lab Result RN at [phone]. You will be contacted by this team for any positive Lab results or changes in treatment. The nurses are available 7 days a week from 10A to 6:30P.  You can leave a message 24 hours per day and they will return your call.        Test Results From Your Hospital Stay        10/12/2017  9:14 PM      Component Results     Component Value Ref Range & Units Status    WBC 8.1 4.0 - 11.0 10e9/L Final    RBC Count 5.00 4.4 - 5.9 10e12/L Final    Hemoglobin 15.1 13.3 - 17.7 g/dL Final    Hematocrit 44.2 40.0 - 53.0 % Final    MCV 88 78 - 100 fl Final    MCH 30.2 26.5 - 33.0 pg Final    MCHC 34.2 31.5 - 36.5 g/dL Final    RDW 14.9 10.0 - 15.0 % Final    Platelet Count 250 150 - 450 10e9/L Final    Diff Method Automated Method  Final    % Neutrophils 54.7 % Final    % Lymphocytes 30.0 % Final    % Monocytes 9.9 % Final    % Eosinophils 4.7 % Final    % Basophils 0.5 % Final    % Immature Granulocytes 0.2 % Final    Absolute Neutrophil 4.4 1.6 - 8.3 10e9/L Final    Absolute Lymphocytes 2.4 0.8 - 5.3 10e9/L Final    Absolute Monocytes 0.8 0.0 - 1.3 10e9/L Final    Absolute Eosinophils 0.4 0.0 - 0.7 10e9/L Final    Absolute Basophils 0.0 0.0 - 0.2 10e9/L Final    Abs Immature Granulocytes 0.0 0 - 0.4 10e9/L Final         10/12/2017  9:28 PM      Component Results     Component Value Ref Range & Units Status    Sodium 141 133 - 144 mmol/L Final    Potassium 4.0 3.4 - 5.3 mmol/L Final     Chloride 109 94 - 109 mmol/L Final    Carbon Dioxide 26 20 - 32 mmol/L Final    Anion Gap 6 3 - 14 mmol/L Final    Glucose 83 70 - 99 mg/dL Final    Urea Nitrogen 24 7 - 30 mg/dL Final    Creatinine 0.95 0.66 - 1.25 mg/dL Final    GFR Estimate 82 >60 mL/min/1.7m2 Final    Non  GFR Calc    GFR Estimate If Black >90 >60 mL/min/1.7m2 Final    African American GFR Calc    Calcium 8.7 8.5 - 10.1 mg/dL Final    Bilirubin Total 0.5 0.2 - 1.3 mg/dL Final    Albumin 3.6 3.4 - 5.0 g/dL Final    Protein Total 7.2 6.8 - 8.8 g/dL Final    Alkaline Phosphatase 136 40 - 150 U/L Final    ALT 92 (H) 0 - 70 U/L Final    AST 51 (H) 0 - 45 U/L Final         10/12/2017  9:26 PM      Component Results     Component Value Ref Range & Units Status    Lipase 129 73 - 393 U/L Final         10/12/2017  9:31 PM      Narrative     CT ABDOMEN PELVIS WITH CONTRAST 10/12/2017 9:15 PM     HISTORY: RLQ abdominal pain.     COMPARISON: CT abdomen pelvis 12/16/2009.    TECHNIQUE: Axial images from the lung bases to the symphysis are  performed with additional coronal reformatted images. 80 mL of Isovue  370 are given intravenously.  Radiation dose for this scan was reduced  using automated exposure control, adjustment of the mA and/or kV  according to patient size, or iterative reconstruction technique.    FINDINGS: The lung bases are clear.    Abdomen: The liver, spleen, gallbladder, pancreas, adrenal glands and  kidneys are unremarkable. No hydronephrosis. No enlarged lymph nodes.  The bowel is normal in caliber without obstruction or diverticulitis.  Fecal debris is scattered throughout the colon possibly indicating  constipation. Appendix is normal.    Pelvis: The bladder and rectum are unremarkable. Rectum is distended  with fecal debris. No enlarged pelvic lymph nodes or free fluid. Bone  window examination is unremarkable. Only mild degenerative spine  changes are present.        Impression     IMPRESSION: No acute changes in  "the abdomen or pelvis to account for  patient's symptoms. No bowel obstruction, diverticulitis or  appendicitis. Possible constipation.     RASHID NELSON MD                Thank you for choosing [facility]       Thank you for choosing [facility] for your care. Our goal is always to provide you with excellent care. Hearing back from our patients is one way we can continue to improve our services. Please take a few minutes to complete the written survey that you may receive in the mail after you visit with us. Thank you!        EVERFANSharGI-View Information     [facility] lets you send messages to your doctor, view your test results, renew your prescriptions, schedule appointments and more. To sign up, go to [email or web address] . Click on \"Log in\" on the left side of the screen, which will take you to the Welcome page. Then click on \"Sign up Now\" on the right side of the page.     You will be asked to enter the access code listed below, as well as some personal information. Please follow the directions to create your username and password.     Your access code is: [email or web address]  Expires: 2017 11:19 AM     Your access code will  in 90 days. If you need help or a new code, please call your [facility] clinic or [phone].        Care EveryWhere ID     This is your Care EveryWhere ID. This could be used by other organizations to access your [facility] medical records  [id number]        Equal Access to Services     AGUEDA GASTELUM : Hadclifford Castorena, waaxda mona, qaybta kaalsanaz padron. So [facility] [phone].    ATENCIÓN: Si habla español, tiene a cordova disposición servicios gratuitos de asistencia lingüística. Tanisha al [phone].    We comply with applicable federal civil rights laws and [place] laws. We do not discriminate on the basis of race, color, national origin, age, disability, sex, sexual orientation, or gender identity.            After Visit Summary     "   This is your record. Keep this with you and show to your community pharmacist(s) and doctor(s) at your next visit.

## 2017-10-13 ASSESSMENT — ENCOUNTER SYMPTOMS
ABDOMINAL PAIN: 1
SHORTNESS OF BREATH: 0
FEVER: 0

## 2017-10-13 NOTE — ED NOTES
Pt resents to ED with complaint of lower right flank pain radiating to URQ. Denies nausea and vomiting and diarrhea. Pain is described as muscular. Denies urinary sx. Hx stroke. Reports fever today but does not know what his temp was. Reports some emotional stress the last few days and feels more depressed than usual the past week.

## 2017-10-13 NOTE — ED PROVIDER NOTES
"  History     Chief Complaint   Patient presents with     Abdominal Pain     RUQ pain that comes and goes. Pt has had a fever and cites \" I am very emotional right now, and I just don't know why\"      HPI  Senrosalinda Watkins is a 56 year old male who has past medical history significant for prior CVA, hyperlipidemia, presenting to the emergency Department with complaints of right upper quadrant abdominal pain that has been intermittent in nature, worsening over the past 1-2 days.  Patient states he has been under increased amounts of stress over the past few days, and has not been sleeping well.  He describes crampy right-sided flank, right lateral abdominal pain over the past 2-3 days.  He reports subjective fever, with no documented elevated temperature, which occurred during the day today.  He has had decreased appetite, and has not had anything to eat since breakfast.  No other ill contacts.  No changes in medications.  Denies any abdominal procedures.  Patient has had some difficulty performing activities since his stroke, and does have chronic right-sided abdominal pain that has been an issue for him recently as well.      Patient Active Problem List   Diagnosis     CARDIOVASCULAR SCREENING; LDL GOAL LESS THAN 130     Intermittent asthma     Nephrolithiasis     Advanced directives, counseling/discussion     Acute ischemic stroke (H)     Acute idiopathic gout of right foot     Screening for prostate cancer     Occipital stroke (H)     Thalamic infarct, acute (H)     Situational anxiety     Osteoarthritis of right foot, unspecified osteoarthritis type         I have reviewed the Medications, Allergies, Past Medical and Surgical History, and Social History in the Epic system.         Review of Systems   Constitutional: Negative for fever.   Respiratory: Negative for shortness of breath.    Cardiovascular: Negative for chest pain.   Gastrointestinal: Positive for abdominal pain.   All other systems reviewed and " are negative.      Physical Exam   BP: 131/82  Pulse: 60  Temp: 98.5  F (36.9  C)  Resp: 16  Weight: 73.5 kg (162 lb)  SpO2: 99 %       Physical Exam  /85  Pulse 60  Temp 98.5  F (36.9  C) (Oral)  Resp 16  Wt 73.5 kg (162 lb)  SpO2 99%  BMI 25.76 kg/m2  General: alert, interactive, in no apparent distress  Head: atraumatic  Nose: no rhinorrhea or epistaxis  Ears: no external auditory canal discharge or bleeding.    Eyes: Sclera nonicteric. Conjunctiva noninjected. PERRL, EOMI  Mouth: no tonsillar erythema, edema, or exudate  Neck: supple, no palp LAD  Lungs: CTAB  CV: RRR, S1/S2; peripheral pulses palpable and symmetric  Abdomen: soft, RUQ minimal ttp, nd, no guarding or rebound. Positive bowel sounds  Extremities: no cyanosis or edema  Skin: no rash or diaphoresis  Neuro: CN II-XII grossly intact, strength 5/5 in UE and LEs bilaterally, sensation intact to light touch in UE and LEs bilaterally;       ED Course     ED Course     Procedures               Critical Care time:  none               Labs Ordered and Resulted from Time of ED Arrival Up to the Time of Departure from the ED   COMPREHENSIVE METABOLIC PANEL - Abnormal; Notable for the following:        Result Value    ALT 92 (*)     AST 51 (*)     All other components within normal limits   CBC WITH PLATELETS DIFFERENTIAL   LIPASE   PERIPHERAL IV CATHETER   FREE WATER     Results for orders placed or performed during the hospital encounter of 10/12/17 (from the past 24 hour(s))   CBC with platelets differential   Result Value Ref Range    WBC 8.1 4.0 - 11.0 10e9/L    RBC Count 5.00 4.4 - 5.9 10e12/L    Hemoglobin 15.1 13.3 - 17.7 g/dL    Hematocrit 44.2 40.0 - 53.0 %    MCV 88 78 - 100 fl    MCH 30.2 26.5 - 33.0 pg    MCHC 34.2 31.5 - 36.5 g/dL    RDW 14.9 10.0 - 15.0 %    Platelet Count 250 150 - 450 10e9/L    Diff Method Automated Method     % Neutrophils 54.7 %    % Lymphocytes 30.0 %    % Monocytes 9.9 %    % Eosinophils 4.7 %    % Basophils 0.5  %    % Immature Granulocytes 0.2 %    Absolute Neutrophil 4.4 1.6 - 8.3 10e9/L    Absolute Lymphocytes 2.4 0.8 - 5.3 10e9/L    Absolute Monocytes 0.8 0.0 - 1.3 10e9/L    Absolute Eosinophils 0.4 0.0 - 0.7 10e9/L    Absolute Basophils 0.0 0.0 - 0.2 10e9/L    Abs Immature Granulocytes 0.0 0 - 0.4 10e9/L   Comprehensive metabolic panel   Result Value Ref Range    Sodium 141 133 - 144 mmol/L    Potassium 4.0 3.4 - 5.3 mmol/L    Chloride 109 94 - 109 mmol/L    Carbon Dioxide 26 20 - 32 mmol/L    Anion Gap 6 3 - 14 mmol/L    Glucose 83 70 - 99 mg/dL    Urea Nitrogen 24 7 - 30 mg/dL    Creatinine 0.95 0.66 - 1.25 mg/dL    GFR Estimate 82 >60 mL/min/1.7m2    GFR Estimate If Black >90 >60 mL/min/1.7m2    Calcium 8.7 8.5 - 10.1 mg/dL    Bilirubin Total 0.5 0.2 - 1.3 mg/dL    Albumin 3.6 3.4 - 5.0 g/dL    Protein Total 7.2 6.8 - 8.8 g/dL    Alkaline Phosphatase 136 40 - 150 U/L    ALT 92 (H) 0 - 70 U/L    AST 51 (H) 0 - 45 U/L   Lipase   Result Value Ref Range    Lipase 129 73 - 393 U/L   CT Abdomen Pelvis w Contrast    Narrative    CT ABDOMEN PELVIS WITH CONTRAST 10/12/2017 9:15 PM     HISTORY: RLQ abdominal pain.     COMPARISON: CT abdomen pelvis 12/16/2009.    TECHNIQUE: Axial images from the lung bases to the symphysis are  performed with additional coronal reformatted images. 80 mL of Isovue  370 are given intravenously.  Radiation dose for this scan was reduced  using automated exposure control, adjustment of the mA and/or kV  according to patient size, or iterative reconstruction technique.    FINDINGS: The lung bases are clear.    Abdomen: The liver, spleen, gallbladder, pancreas, adrenal glands and  kidneys are unremarkable. No hydronephrosis. No enlarged lymph nodes.  The bowel is normal in caliber without obstruction or diverticulitis.  Fecal debris is scattered throughout the colon possibly indicating  constipation. Appendix is normal.    Pelvis: The bladder and rectum are unremarkable. Rectum is distended  with  fecal debris. No enlarged pelvic lymph nodes or free fluid. Bone  window examination is unremarkable. Only mild degenerative spine  changes are present.      Impression    IMPRESSION: No acute changes in the abdomen or pelvis to account for  patient's symptoms. No bowel obstruction, diverticulitis or  appendicitis. Possible constipation.     RASHID NELSON MD          Assessments & Plan (with Medical Decision Making)  56 year old male , with past medical history significant for stroke, presenting to the emergency department with right-sided flank, and right lower quadrant abdominal pain.  Also has had right upper quadrant abdominal pain.  Differential includes renal colic, ureteral colic, hepatitis, cholecystitis, appendicitis, musculoskeletal strain, diverticulitis, viral illness.    IV established, labs drawn.  Patient has difficult historian.  Denies any alcohol use.  He has had pain over the past 1-2 days, with increasing amounts of nausea, and pain during the day today.  Therefore, CT scan is ordered.  CT scan is negative for appendicitis, or other acute cause of patient's symptoms.  Does have increased stool burning, which may be contributing.  Encouraged to follow up with primary care provider, and return if severe worsening of symptoms.       I have reviewed the nursing notes.    I have reviewed the findings, diagnosis, plan and need for follow up with the patient.       Discharge Medication List as of 10/12/2017  9:56 PM          Final diagnoses:   Abdominal pain, right upper quadrant   Anxiety   Fever, unspecified fever cause       10/12/2017   [facility] EMERGENCY DEPARTMENT     Lavelle Leyva MD  10/13/17 0045

## 2017-10-13 NOTE — DISCHARGE INSTRUCTIONS
Labs and CT were normal.          Abdominal Pain    Abdominal pain is pain in the stomach or belly area. Everyone has this pain from time to time. In many cases it goes away on its own. But abdominal pain can sometimes be due to a serious problem, such as appendicitis. So it s important to know when to seek help.  Causes of abdominal pain  There are many possible causes of abdominal pain. Common causes in adults include:    Constipation, diarrhea, or gas    Stomach acid flowing back up into the esophagus (acid reflux or heartburn)    Severe acid reflux, called GERD (gastroesophageal reflux disease)    A sore in the lining of the stomach or small intestine (peptic ulcer)    Inflammation of the gallbladder, liver, or pancreas    Gallstones or kidney stones    Appendicitis     Intestinal blockage     An internal organ pushing through a muscle or other tissue (hernia)    Urinary tract infections    In women, menstrual cramps, fibroids, or endometriosis    Inflammation or infection of the intestines  Diagnosing the cause of abdominal pain  Your healthcare provider will do a physical exam help find the cause of your pain. If needed, tests will be ordered. Belly pain has many possible causes. So it can be hard to find the reason for your pain. Giving details about your pain can help. Tell your provider where and when you feel the pain, and what makes it better or worse. Also let your provider know if you have other symptoms such as:    Fever    Tiredness    Upset stomach (nausea)    Vomiting    Changes in bathroom habits  Treating abdominal pain  Some causes of pain need emergency medical treatment right away. These include appendicitis or a bowel blockage. Other problems can be treated with rest, fluids, or medicines. Your healthcare provider can give you specific instructions for treatment or self-care based on what is causing your pain.  If you have vomiting or diarrhea, sip water or other clear fluids. When you are  ready to eat solid foods again, start with small amounts of easy-to-digest, low-fat foods. These include apple sauce, toast, or crackers.   When to seek medical care  Call 911 or go to the hospital right away if you:    Can t pass stool and are vomiting    Are vomiting blood or have bloody diarrhea or black, tarry diarrhea    Have chest, neck, or shoulder pain    Feel like you might pass out    Have pain in your shoulder blades with nausea    Have sudden, severe belly pain    Have new, severe pain unlike any you have felt before    Have a belly that is rigid, hard, and tender to touch  Call your healthcare provider if you have:    Pain for more than 5 days    Bloating for more than 2 days    Diarrhea for more than 5 days    A fever of 100.4 F (38.0 C) or higher, or as directed by your provider    Pain that gets worse    Weight loss for no reason    Continued lack of appetite    Blood in your stool  How to prevent abdominal pain  Here are some tips to help prevent abdominal pain:    Eat smaller amounts of food at one time.    Avoid greasy, fried, or other high-fat foods.    Avoid foods that give you gas.    Exercise regularly.    Drink plenty of fluids.  To help prevent GERD symptoms:    Quit smoking.    Reduce alcohol and certain foods that increase stomach acid.    Avoid aspirin and over-the-counter pain and fever medicines (NSAIDS or nonsteroidal anti-inflammatory drugs), if possible    Lose extra weight.    Finish eating at least 2 hours before you go to bed or lie down.    Raise the head of your bed.  Date Last Reviewed: 7/1/2016 2000-2017 [employer]. [address] [place] [address], [place] [address]. All rights reserved. This information is not intended as a substitute for professional medical care. Always follow your healthcare professional's instructions.

## 2017-12-09 DIAGNOSIS — M10.071 ACUTE IDIOPATHIC GOUT OF RIGHT FOOT: ICD-10-CM

## 2017-12-10 NOTE — TELEPHONE ENCOUNTER
Requested Prescriptions   Pending Prescriptions Disp Refills     diclofenac (VOLTAREN) 50 MG EC tablet       Last Written Prescription Date: 10/9/17  Last Fill Quantity: 90,  # refills: 0   Last Office Visit with G, P or [facility] prescribing provider: 9/26/17                                              90 tablet 0     Sig: TAKE ONE TABLET BY MOUTH THREE TIMES A DAY AS NEEDED FOR MODERATE PAIN    NSAID Medications Passed    12/9/2017 11:48 AM       Passed - Blood pressure under 140/90    BP Readings from Last 3 Encounters:   10/12/17 121/85   09/26/17 120/68   05/01/17 120/78                Passed - Normal ALT on file in past 12 months    Recent Labs   Lab Test  10/12/17   2100   ALT  92*            Passed - Normal AST on file in past 12 months    Recent Labs   Lab Test  10/12/17   2100   AST  51*            Passed - Recent or future visit with authorizing provider's specialty    Patient had office visit in the last year or has a visit in the next 30 days with authorizing provider.  See chart review.              Passed - Patient is age 6-64 years       Passed - Normal CBC on file in past 12 months    Recent Labs   Lab Test  10/12/17   2100   WBC  8.1   RBC  5.00   HGB  15.1   HCT  44.2   PLT  250            Passed - Normal serum creatinine on file in past 12 months    Recent Labs   Lab Test  10/12/17   2100   CR  0.95

## 2017-12-11 NOTE — TELEPHONE ENCOUNTER
Routing refill request to provider for review/approval because:  Labs out of range:  See below    Rosa M Joe RN

## 2018-01-11 DIAGNOSIS — M1A.09X0 IDIOPATHIC CHRONIC GOUT OF MULTIPLE SITES WITHOUT TOPHUS: ICD-10-CM

## 2018-01-11 DIAGNOSIS — M10.071 ACUTE IDIOPATHIC GOUT OF RIGHT FOOT: ICD-10-CM

## 2018-01-11 RX ORDER — ALLOPURINOL 100 MG/1
TABLET ORAL
Qty: 90 TABLET | Refills: 1 | Status: SHIPPED | OUTPATIENT
Start: 2018-01-11 | End: 2018-02-15

## 2018-01-11 NOTE — TELEPHONE ENCOUNTER
"Maria Dolores:  Please advise on allopurinol refill as pt's labs are outside of range.  Aleisha DUBON RN    Requested Prescriptions   Pending Prescriptions Disp Refills     allopurinol (ZYLOPRIM) 100 MG tablet [Pharmacy Med Name: ALLOPURINOL 100MG TABS] 90 tablet 1     Sig: TAKE ONE TABLET BY MOUTH EVERY DAY    Gout Agents Protocol Failed    1/11/2018  3:06 PM       Failed - Uric acid greater than or equal to 6 on file in past 12 months    Recent Labs   Lab Test  01/02/17   1059   URIC  4.6     If level is 6mg/dL or greater, ok to refill one time and refer to provider.          Failed - Recent or future visit with authorizing provider's specialty    Patient had office visit in the last year or has a visit in the next 30 days with authorizing provider.  See \"Patient Info\" tab in inbasket, or \"Choose Columns\" in Meds & Orders section of the refill encounter.              Passed - CBC on file in past 12 months    Recent Labs   Lab Test  10/12/17   2100   WBC  8.1   RBC  5.00   HGB  15.1   HCT  44.2   PLT  250            Passed - ALT on file in past 12 months    Recent Labs   Lab Test  10/12/17   2100   ALT  92*            Passed - Patient is age 18 or older       Passed - Normal serum creatinine on file in the past 12 months    Recent Labs   Lab Test  10/12/17   2100   CR  0.95               "

## 2018-02-05 ENCOUNTER — OFFICE VISIT (OUTPATIENT)
Dept: PODIATRY | Facility: CLINIC | Age: 58
End: 2018-02-05
Payer: COMMERCIAL

## 2018-02-05 VITALS — WEIGHT: 171.1 LBS | HEART RATE: 64 BPM | HEIGHT: 67 IN | BODY MASS INDEX: 26.85 KG/M2

## 2018-02-05 DIAGNOSIS — L60.0 INGROWING LEFT GREAT TOENAIL: Primary | ICD-10-CM

## 2018-02-05 PROCEDURE — 11750 EXCISION NAIL&NAIL MATRIX: CPT | Mod: TA | Performed by: PODIATRIST

## 2018-02-05 NOTE — NURSING NOTE
"Chief Complaint   Patient presents with     Procedure     Bilateral ingrown toenails, great toenails.        Initial Pulse 64  Ht 5' 6.5\" (1.689 m)  Wt 171 lb 1.6 oz (77.6 kg)  BMI 27.2 kg/m2 Estimated body mass index is 27.2 kg/(m^2) as calculated from the following:    Height as of this encounter: 5' 6.5\" (1.689 m).    Weight as of this encounter: 171 lb 1.6 oz (77.6 kg).  Medication Reconciliation: complete     Francia Jones MA        "

## 2018-02-05 NOTE — MR AVS SNAPSHOT
"              After Visit Summary   2/5/2018    Sen Watkins    MRN: [id number]           Patient Information     Date Of Birth          1960        Visit Information        Provider Department      2/5/2018 2:00 PM Nelson Johnson DPM [facility]        Today's Diagnoses     Ingrowing left great toenail    -  1      Care Instructions    Post toenail procedure instructions:    1.  Keep bandage on the rest of the day; take off in the morning.  2.  Soak the toe in warm water with Epsom's Salt or Dreft detergent for 20 min.  3.  Clean out any scab tissue from the treated area with a toothpick or Q-tip.  4.  Apply a topical antibiotic to the treated area and bandage with a Band-Aid.  5.  Repeat morning and night for two weeks            Follow-ups after your visit        Who to contact     If you have questions or need follow up information about today's clinic visit or your schedule please contact [facility] directly at [phone].  Normal or non-critical lab and imaging results will be communicated to you by [employer], letter or phone within 4 business days after the clinic has received the results. If you do not hear from us within 7 days, please contact the clinic through [employer] or phone. If you have a critical or abnormal lab result, we will notify you by phone as soon as possible.  Submit refill requests through [employer] or call your pharmacy and they will forward the refill request to us. Please allow 3 business days for your refill to be completed.          Additional Information About Your Visit        DGSE Information     DGSE lets you send messages to your doctor, view your test results, renew your prescriptions, schedule appointments and more. To sign up, go to [email or web address] . Click on \"Log in\" on the left side of the screen, which will take you to the Welcome page. Then click on \"Sign up Now\" on the right " "side of the page.     You will be asked to enter the access code listed below, as well as some personal information. Please follow the directions to create your username and password.     Your access code is: [email or web address]  Expires: 2018  2:51 PM     Your access code will  in 90 days. If you need help or a new code, please call your [place] clinic or [phone].        Care EveryWhere ID     This is your Care EveryWhere ID. This could be used by other organizations to access your [place] medical records  [id number]        Your Vitals Were     Pulse Height BMI (Body Mass Index)             64 5' 6.5\" (1.689 m) 27.2 kg/m2          Blood Pressure from Last 3 Encounters:   10/12/17 121/85   17 120/68   17 120/78    Weight from Last 3 Encounters:   18 171 lb 1.6 oz (77.6 kg)   10/12/17 162 lb (73.5 kg)   17 160 lb (72.6 kg)              We Performed the Following     REMOVAL NAIL/NAIL BED, PARTIAL OR COMPLETE        Primary Care Provider Office Phone # Fax #    Maria Doloresjustin MonkMYRA He [facility] [phone]       [address] [place] [id number]        Equal Access to Services     AGUEDA GASTELUM AH: Hadii aad ku hadasho Soomaali, waaxda luqadaha, qaybta kaalmada adeegyada, sanaz sharma haysilke frey . So [facility] [phone].    ATENCIÓN: Si habla español, tiene a cordova disposición servicios gratuitos de asistencia lingüística. Llame al [phone].    We comply with applicable federal civil rights laws and [place] laws. We do not discriminate on the basis of race, color, national origin, age, disability, sex, sexual orientation, or gender identity.            Thank you!     Thank you for choosing [facility]  for your care. Our goal is always to provide you with excellent care. Hearing back from our patients is one way we can continue to improve our services. Please take a few minutes to complete the written survey that you may " receive in the mail after your visit with us. Thank you!             Your Updated Medication List - Protect others around you: Learn how to safely use, store and throw away your medicines at www.disposemymeds.org.          This list is accurate as of 2/5/18  2:51 PM.  Always use your most recent med list.                   Brand Name Dispense Instructions for use Diagnosis    allopurinol 100 MG tablet    ZYLOPRIM    90 tablet    TAKE ONE TABLET BY MOUTH EVERY DAY    Idiopathic chronic gout of multiple sites without tophus       aspirin 81 MG EC tablet      Take 1 tablet (81 mg) by mouth daily    Cerebrovascular accident (CVA), unspecified mechanism (H)       atorvastatin 40 MG tablet    LIPITOR    90 tablet    TAKE ONE TABLET BY MOUTH EVERY DAY    Occipital stroke (H), Thalamic infarct, acute (H)       diclofenac 50 MG EC tablet    VOLTAREN    90 tablet    TAKE ONE TABLET BY MOUTH THREE TIMES A DAY AS NEEDED FOR MODERATE PAIN    Acute idiopathic gout of right foot       HERBALS           STRESS B COMPLEX/IRON Tabs     100 tablet    Take 1 tablet by mouth daily

## 2018-02-05 NOTE — LETTER
"    2/5/2018         RE: Sen Watkins  [address]   [facility] [address]        Dear Colleague,    Thank you for referring your patient, Sen Watkins, to the [facility] [place]. Please see a copy of my visit note below.    Sen Watkins is a 57 year old male who presents with a chief complain of a painful ingrown toenail to the left great toe.  The patient relates the ingrown nail was first noticed several weeks ago and has steadily become worse.  The patient relates the medial nail border is inflamed and sore to the touch.  The patient relates no bloody drainage.  The patient denies any redness extending up the big toe.  The patient has been treating the big toe by soaking it in salt water and antibiotics with no relief.      REVIEW OF SYSTEMS:  Constitutional, HEENT, cardiovascular, pulmonary, GI, , musculoskeletal, neuro, skin, endocrine and psych systems are negative, except as otherwise noted.     PAST MEDICAL HISTORY:   Past Medical History:   Diagnosis Date     Intermittent asthma 4/25/2011    dust, weather changes.  doesn't need/want maintenance inhaler.  long stretches with no use of any inhaler.  albuterol prn.   Came to me with the diagnosis.  Likes \"having albuterol around\", unsure on details on the actual diagnosis.        Nephrolithiasis 4/25/2011    has had 2 episode of stones.  \"calcium stones\"         PAST SURGICAL HISTORY: No past surgical history on file.     MEDICATIONS:   Current Outpatient Prescriptions:      allopurinol (ZYLOPRIM) 100 MG tablet, TAKE ONE TABLET BY MOUTH EVERY DAY, Disp: 90 tablet, Rfl: 1     atorvastatin (LIPITOR) 40 MG tablet, TAKE ONE TABLET BY MOUTH EVERY DAY, Disp: 90 tablet, Rfl: 2     HERBALS, , Disp: , Rfl:      Multiple Vitamins-Iron (STRESS B COMPLEX/IRON) TABS, Take 1 tablet by mouth daily, Disp: 100 tablet, Rfl: 3     aspirin EC 81 MG EC tablet, Take 1 tablet (81 mg) by mouth daily, Disp: , Rfl:      diclofenac " "(VOLTAREN) 50 MG EC tablet, TAKE ONE TABLET BY MOUTH THREE TIMES A DAY AS NEEDED FOR MODERATE PAIN, Disp: 90 tablet, Rfl: 0     ALLERGIES:    Allergies   Allergen Reactions     Bees      Codeine Nausea and Vomiting     Tylenol [Acetaminophen] GI Disturbance     vomitting         SOCIAL HISTORY:   Social History     Social History     Marital status:      Spouse name: N/A     Number of children: N/A     Years of education: N/A     Occupational History     Not on file.     Social History Main Topics     Smoking status: Former Smoker     Types: Cigarettes     Quit date: 4/26/2012     Smokeless tobacco: Former User     Alcohol use No     Drug use: No     Sexual activity: Yes     Partners: Female     Other Topics Concern     Parent/Sibling W/ Cabg, Mi Or Angioplasty Before 65f 55m? No     Social History Narrative        FAMILY HISTORY:   Family History   Problem Relation Age of Onset     Coronary Artery Disease Mother      CEREBROVASCULAR DISEASE Brother      Psychotic Disorder Sister      Psychotic Disorder Brother         EXAM:Vitals: Pulse 64  Ht 5' 6.5\" (1.689 m)  Wt 171 lb 1.6 oz (77.6 kg)  BMI 27.2 kg/m2  BMI= Body mass index is 27.2 kg/(m^2).  Weight management plan: Patient was referred to their PCP to discuss a diet and exercise plan.    General appearance: Patient is alert and fully cooperative with history & exam.  No sign of distress is noted during the visit.     Psychiatric: Affect is pleasant & appropriate.  Patient appears motivated to improve health.     Respiratory: Breathing is regular & unlabored while sitting.     HEENT: Hearing is intact to spoken word.  Speech is clear.  No gross evidence of visual impairment that would impact ambulation.     Dermatologic: Skin is intact to both lower extremities without significant lesions, rash or abrasion.  Noted paronychia.     Vascular: DP & PT pulses are intact & regular bilaterally.  No significant edema or varicosities noted.  CFT and skin " temperature is normal to both lower extremities.     Neurologic: Lower extremity sensation is intact to light touch.  No evidence of weakness or contracture in the lower extremities.  No evidence of neuropathy.     Musculoskeletal: Patient is ambulatory without assistive device or brace.  No gross ankle deformity noted.  No foot or ankle joint effusion is noted.    One notes an inflamed medial nail border of the left great toe.  There is noted erythema and edema located around the medial labial fold and does not extend past the interphalangeal joint.  There is no apparent purulent drainage noted.  There is pain on palpation to the proximal aspect of the medial nail border.      Assessment:  1.  Paranychia to the medial aspect of the left great toe.      Plan:  I have explained to Sen about the condition.  The potential causes and nature of an ingrown toenail were discussed with the patient.  We reviewed the natural history/prognosis of the condition and potential risks if no treatment is provided.      Treatment options discussed included conservative management (oral antibiotics, soaking of foot, adequate width shoes)  as well as surgical management (partial or total nail removal).  The pros and cons of both forms of treatment were reviewed.      After thorough discussion and answering all questions, the patient elected to proceed with surgery.    At this point, I recommended having the offending nail border permanently removed.  I have explained to the patient all of the possible risks, benefits, alternatives to the procedure.  The patient consented to the proposed procedure.  The left hallux was swabbed with alcohol.  Next, approximately 5 cc of 1% lidocaine plain was injected around the left hallux.  The left hallux was then prepped with Betadine ointment.  A tourniquet was applied to the left hallux.  The offending nail border was split using an English anvil back to the matrix.  Next, utilizing a straight  hemostat the offending nail border was avulsed with the attached matrix.  The wound bed was debrided on any remaining nail, matrix and skin.  Next, the offending nail border was treated with 89% phenol using microtip cotton applicators for 30 seconds.  The wound was then irrigated and dressed with Triple antibiotic ointment and a compressive dry sterile dressing.  The tourniquet was removed and a prompt hyperemic response was noted.  The patient tolerated the procedure well with no complications.  The patient was given post procedure instructions for the care of the wound.      Disclaimer: This note consists of symbols derived from keyboarding, dictation and/or voice recognition software. As a result, there may be errors in the script that have gone undetected. Please consider this when interpreting information found in this chart.      DARRELL Bae.P.JESSICA., F.A.C.F.A.S.            Again, thank you for allowing me to participate in the care of your patient.        Sincerely,        Nelson Johnson DPM

## 2018-02-05 NOTE — PROGRESS NOTES
"Sen Watkins is a 57 year old male who presents with a chief complain of a painful ingrown toenail to the left great toe.  The patient relates the ingrown nail was first noticed several weeks ago and has steadily become worse.  The patient relates the medial nail border is inflamed and sore to the touch.  The patient relates no bloody drainage.  The patient denies any redness extending up the big toe.  The patient has been treating the big toe by soaking it in salt water and antibiotics with no relief.      REVIEW OF SYSTEMS:  Constitutional, HEENT, cardiovascular, pulmonary, GI, , musculoskeletal, neuro, skin, endocrine and psych systems are negative, except as otherwise noted.     PAST MEDICAL HISTORY:   Past Medical History:   Diagnosis Date     Intermittent asthma 4/25/2011    dust, weather changes.  doesn't need/want maintenance inhaler.  long stretches with no use of any inhaler.  albuterol prn.   Came to me with the diagnosis.  Likes \"having albuterol around\", unsure on details on the actual diagnosis.        Nephrolithiasis 4/25/2011    has had 2 episode of stones.  \"calcium stones\"         PAST SURGICAL HISTORY: No past surgical history on file.     MEDICATIONS:   Current Outpatient Prescriptions:      allopurinol (ZYLOPRIM) 100 MG tablet, TAKE ONE TABLET BY MOUTH EVERY DAY, Disp: 90 tablet, Rfl: 1     atorvastatin (LIPITOR) 40 MG tablet, TAKE ONE TABLET BY MOUTH EVERY DAY, Disp: 90 tablet, Rfl: 2     HERBALS, , Disp: , Rfl:      Multiple Vitamins-Iron (STRESS B COMPLEX/IRON) TABS, Take 1 tablet by mouth daily, Disp: 100 tablet, Rfl: 3     aspirin EC 81 MG EC tablet, Take 1 tablet (81 mg) by mouth daily, Disp: , Rfl:      diclofenac (VOLTAREN) 50 MG EC tablet, TAKE ONE TABLET BY MOUTH THREE TIMES A DAY AS NEEDED FOR MODERATE PAIN, Disp: 90 tablet, Rfl: 0     ALLERGIES:    Allergies   Allergen Reactions     Bees      Codeine Nausea and Vomiting     Tylenol [Acetaminophen] GI Disturbance     " "vomitting         SOCIAL HISTORY:   Social History     Social History     Marital status:      Spouse name: N/A     Number of children: N/A     Years of education: N/A     Occupational History     Not on file.     Social History Main Topics     Smoking status: Former Smoker     Types: Cigarettes     Quit date: 4/26/2012     Smokeless tobacco: Former User     Alcohol use No     Drug use: No     Sexual activity: Yes     Partners: Female     Other Topics Concern     Parent/Sibling W/ Cabg, Mi Or Angioplasty Before 65f 55m? No     Social History Narrative        FAMILY HISTORY:   Family History   Problem Relation Age of Onset     Coronary Artery Disease Mother      CEREBROVASCULAR DISEASE Brother      Psychotic Disorder Sister      Psychotic Disorder Brother         EXAM:Vitals: Pulse 64  Ht 5' 6.5\" (1.689 m)  Wt 171 lb 1.6 oz (77.6 kg)  BMI 27.2 kg/m2  BMI= Body mass index is 27.2 kg/(m^2).  Weight management plan: Patient was referred to their PCP to discuss a diet and exercise plan.    General appearance: Patient is alert and fully cooperative with history & exam.  No sign of distress is noted during the visit.     Psychiatric: Affect is pleasant & appropriate.  Patient appears motivated to improve health.     Respiratory: Breathing is regular & unlabored while sitting.     HEENT: Hearing is intact to spoken word.  Speech is clear.  No gross evidence of visual impairment that would impact ambulation.     Dermatologic: Skin is intact to both lower extremities without significant lesions, rash or abrasion.  Noted paronychia.     Vascular: DP & PT pulses are intact & regular bilaterally.  No significant edema or varicosities noted.  CFT and skin temperature is normal to both lower extremities.     Neurologic: Lower extremity sensation is intact to light touch.  No evidence of weakness or contracture in the lower extremities.  No evidence of neuropathy.     Musculoskeletal: Patient is ambulatory without " assistive device or brace.  No gross ankle deformity noted.  No foot or ankle joint effusion is noted.    One notes an inflamed medial nail border of the left great toe.  There is noted erythema and edema located around the medial labial fold and does not extend past the interphalangeal joint.  There is no apparent purulent drainage noted.  There is pain on palpation to the proximal aspect of the medial nail border.      Assessment:  1.  Paranychia to the medial aspect of the left great toe.      Plan:  I have explained to Sen about the condition.  The potential causes and nature of an ingrown toenail were discussed with the patient.  We reviewed the natural history/prognosis of the condition and potential risks if no treatment is provided.      Treatment options discussed included conservative management (oral antibiotics, soaking of foot, adequate width shoes)  as well as surgical management (partial or total nail removal).  The pros and cons of both forms of treatment were reviewed.      After thorough discussion and answering all questions, the patient elected to proceed with surgery.    At this point, I recommended having the offending nail border permanently removed.  I have explained to the patient all of the possible risks, benefits, alternatives to the procedure.  The patient consented to the proposed procedure.  The left hallux was swabbed with alcohol.  Next, approximately 5 cc of 1% lidocaine plain was injected around the left hallux.  The left hallux was then prepped with Betadine ointment.  A tourniquet was applied to the left hallux.  The offending nail border was split using an English anvil back to the matrix.  Next, utilizing a straight hemostat the offending nail border was avulsed with the attached matrix.  The wound bed was debrided on any remaining nail, matrix and skin.  Next, the offending nail border was treated with 89% phenol using microtip cotton applicators for 30 seconds.  The  wound was then irrigated and dressed with Triple antibiotic ointment and a compressive dry sterile dressing.  The tourniquet was removed and a prompt hyperemic response was noted.  The patient tolerated the procedure well with no complications.  The patient was given post procedure instructions for the care of the wound.      Disclaimer: This note consists of symbols derived from keyboarding, dictation and/or voice recognition software. As a result, there may be errors in the script that have gone undetected. Please consider this when interpreting information found in this chart.      EMERSON Johnson D.P.M., FDAVID.F.A.S.

## 2018-02-12 DIAGNOSIS — M10.071 ACUTE IDIOPATHIC GOUT OF RIGHT FOOT: ICD-10-CM

## 2018-02-12 NOTE — TELEPHONE ENCOUNTER
"Patient reports he tried to stop taking the medication for a couple days. During this time he experience mood swings and pain. He will make an appointment coming up to speak with Maria Dolores Krishnamurthy about this.    Requested Prescriptions   Pending Prescriptions Disp Refills     diclofenac (VOLTAREN) 50 MG EC tablet [Pharmacy Med Name: DICLOFENAC SODIUM 50MG TBEC]  Last Written Prescription Date:  1-11-18  Last Fill Quantity: 90,  # refills: 0   Last office visit: 9/26/2017 with prescribing provider:     Future Office Visit:   90 tablet 0     Sig: TAKE ONE TABLET BY MOUTH THREE TIMES A DAY AS NEEDED FOR MODERATE PAIN    NSAID Medications Failed    2/12/2018  9:43 AM       Failed - Normal ALT on file in past 12 months    Recent Labs   Lab Test  10/12/17   2100   ALT  92*            Passed - Blood pressure under 140/90    BP Readings from Last 3 Encounters:   10/12/17 121/85   09/26/17 120/68   05/01/17 120/78                Passed - Normal AST on file in past 12 months    Recent Labs   Lab Test  10/12/17   2100   AST  51*            Passed - Recent or future visit with authorizing provider's specialty    Patient had office visit in the last year or has a visit in the next 30 days with authorizing provider.  See \"Patient Info\" tab in inbasket, or \"Choose Columns\" in Meds & Orders section of the refill encounter.            Passed - Patient is age 6-64 years       Passed - Normal CBC on file in past 12 months    Recent Labs   Lab Test  10/12/17   2100   WBC  8.1   RBC  5.00   HGB  15.1   HCT  44.2   PLT  250            Passed - Normal serum creatinine on file in past 12 months    Recent Labs   Lab Test  10/12/17   2100   CR  0.95                 "

## 2018-02-13 NOTE — TELEPHONE ENCOUNTER
Pt has 3 more pills to get through today. Pt is requesting this to be filled today.  Emi [place] Sec

## 2018-02-15 ENCOUNTER — OFFICE VISIT (OUTPATIENT)
Dept: FAMILY MEDICINE | Facility: CLINIC | Age: 58
End: 2018-02-15
Payer: COMMERCIAL

## 2018-02-15 VITALS
WEIGHT: 163 LBS | HEIGHT: 67 IN | TEMPERATURE: 97.3 F | SYSTOLIC BLOOD PRESSURE: 120 MMHG | DIASTOLIC BLOOD PRESSURE: 60 MMHG | RESPIRATION RATE: 14 BRPM | OXYGEN SATURATION: 99 % | HEART RATE: 60 BPM | BODY MASS INDEX: 25.58 KG/M2

## 2018-02-15 DIAGNOSIS — L60.0 INGROWING LEFT GREAT TOENAIL: ICD-10-CM

## 2018-02-15 DIAGNOSIS — Z11.59 ENCOUNTER FOR HEPATITIS C SCREENING TEST FOR LOW RISK PATIENT: ICD-10-CM

## 2018-02-15 DIAGNOSIS — M10.071 ACUTE IDIOPATHIC GOUT OF RIGHT FOOT: ICD-10-CM

## 2018-02-15 DIAGNOSIS — I63.9 OCCIPITAL STROKE (H): ICD-10-CM

## 2018-02-15 DIAGNOSIS — M1A.09X0 IDIOPATHIC CHRONIC GOUT OF MULTIPLE SITES WITHOUT TOPHUS: ICD-10-CM

## 2018-02-15 DIAGNOSIS — I63.81 THALAMIC INFARCT, ACUTE (H): ICD-10-CM

## 2018-02-15 DIAGNOSIS — I63.9 ACUTE ISCHEMIC STROKE (H): Primary | ICD-10-CM

## 2018-02-15 PROCEDURE — 99214 OFFICE O/P EST MOD 30 MIN: CPT | Performed by: NURSE PRACTITIONER

## 2018-02-15 RX ORDER — ATORVASTATIN CALCIUM 40 MG/1
40 TABLET, FILM COATED ORAL DAILY
Qty: 90 TABLET | Refills: 2 | Status: SHIPPED | OUTPATIENT
Start: 2018-02-15 | End: 2018-11-20

## 2018-02-15 RX ORDER — ALLOPURINOL 100 MG/1
100 TABLET ORAL DAILY
Qty: 90 TABLET | Refills: 1 | Status: SHIPPED | OUTPATIENT
Start: 2018-02-15 | End: 2018-09-19

## 2018-02-15 NOTE — LETTER
My Asthma Action Plan  Name: Sen Watkins   YOB: 1960  Date: 2/15/2018   My doctor: MYRA Craig CNP   My clinic: [facility] in [place]        My Control Medicine: None  My Rescue Medicine: none   My Asthma Severity: mild persistent  Avoid your asthma triggers: humidity               GREEN ZONE   Good Control    I feel good    No cough or wheeze    Can work, sleep and play without asthma symptoms       Take your asthma control medicine every day.     1. If exercise triggers your asthma, take your rescue medication    15 minutes before exercise or sports, and    During exercise if you have asthma symptoms  2. Spacer to use with inhaler: If you have a spacer, make sure to use it with your inhaler             YELLOW ZONE Getting Worse  I have ANY of these:    I do not feel good    Cough or wheeze    Chest feels tight    Wake up at night   1. Keep taking your Green Zone medications  2. Start taking your rescue medicine:    every 20 minutes for up to 1 hour. Then every 4 hours for 24-48 hours.  3. If you stay in the Yellow Zone for more than 12-24 hours, contact your doctor.  4. If you do not return to the Green Zone in 12-24 hours or you get worse, start taking your oral steroid medicine if prescribed by your provider.           RED ZONE Medical Alert - Get Help  I have ANY of these:    I feel awful    Medicine is not helping    Breathing getting harder    Trouble walking or talking    Nose opens wide to breathe       1. Take your rescue medicine NOW  2. If your provider has prescribed an oral steroid medicine, start taking it NOW  3. Call your doctor NOW  4. If you are still in the Red Zone after 20 minutes and you have not reached your doctor:    Take your rescue medicine again and    Call 911 or go to the emergency room right away    See your regular doctor within 2 weeks of an Emergency Room or Urgent Care visit for follow-up treatment.        Electronically signed by:  Sharmin Arriola, February 15, 2018    Annual Reminders:  Meet with Asthma Educator,  Flu Shot in the Fall, consider Pneumonia Vaccination for patients with asthma (aged 19 and older).    Pharmacy:    [id number] - [place], [place] - [address] [facility] [place] [address] [facility]                    Asthma Triggers  How To Control Things That Make Your Asthma Worse    Triggers are things that make your asthma worse.  Look at the list below to help you find your triggers and what you can do about them.  You can help prevent asthma flare-ups by staying away from your triggers.      Trigger                                                          What you can do   Cigarette Smoke  Tobacco smoke can make asthma worse. Do not allow smoking in your home, car or around you.  Be sure no one smokes at a child s day care or school.  If you smoke, ask your health care provider for ways to help you quit.  Ask family members to quit too.  Ask your health care provider for a referral to Quit Plan to help you quit smoking, or call 9-673-492-PLAN.     Colds, Flu, Bronchitis  These are common triggers of asthma. Wash your hands often.  Don t touch your eyes, nose or mouth.  Get a flu shot every year.     Dust Mites  These are tiny bugs that live in cloth or carpet. They are too small to see. Wash sheets and blankets in hot water every week.   Encase pillows and mattress in dust mite proof covers.  Avoid having carpet if you can. If you have carpet, vacuum weekly.   Use a dust mask and HEPA vacuum.   Pollen and Outdoor Mold  Some people are allergic to trees, grass, or weed pollen, or molds. Try to keep your windows closed.  Limit time out doors when pollen count is high.   Ask you health care provider about taking medicine during allergy season.     Animal Dander  Some people are allergic to skin flakes, urine or saliva from pets with fur or feathers. Keep pets with fur or feathers out  of your home.    If you can t keep the pet outdoors, then keep the pet out of your bedroom.  Keep the bedroom door closed.  Keep pets off cloth furniture and away from stuffed toys.     Mice, Rats, and Cockroaches  Some people are allergic to the waste from these pests.   Cover food and garbage.  Clean up spills and food crumbs.  Store grease in the refrigerator.   Keep food out of the bedroom.   Indoor Mold  This can be a trigger if your home has high moisture. Fix leaking faucets, pipes, or other sources of water.   Clean moldy surfaces.  Dehumidify basement if it is damp and smelly.   Smoke, Strong Odors, and Sprays  These can reduce air quality. Stay away from strong odors and sprays, such as perfume, powder, hair spray, paints, smoke incense, paint, cleaning products, candles and new carpet.   Exercise or Sports  Some people with asthma have this trigger. Be active!  Ask your doctor about taking medicine before sports or exercise to prevent symptoms.    Warm up for 5-10 minutes before and after sports or exercise.     Other Triggers of Asthma  Cold air:  Cover your nose and mouth with a scarf.  Sometimes laughing or crying can be a trigger.  Some medicines and food can trigger asthma.

## 2018-02-15 NOTE — NURSING NOTE
"Chief Complaint   Patient presents with     Arthritis     recheck medications IFRAH        Initial There were no vitals taken for this visit. Estimated body mass index is 27.2 kg/(m^2) as calculated from the following:    Height as of 2/5/18: 5' 6.5\" (1.689 m).    Weight as of 2/5/18: 171 lb 1.6 oz (77.6 kg).      Health Maintenance that is potentially due pending provider review:  ACT    Possibly completing today per provider review.    Is there anyone who you would like to be able to receive your results? No  If yes have patient fill out MAYRA    "

## 2018-02-15 NOTE — PATIENT INSTRUCTIONS
[email or web address]  Consider consult with  Dr Gomez at [facility] if you would like to look into herbal medicine.         Gout Diet  Gout is a painful condition caused by an excess of uric acid, a waste product made by the body. Uric acid forms crystals that collect in the joints. The immune response to these crystals brings on symptoms of joint pain and swelling. This is called a gout attack. Often, medications and diet changes are combined to manage gout. Below are some guidelines for changing your diet to help you manage gout and prevent attacks. Your health care provider will help you determine the best eating plan for you.     Eating to manage gout  Weight loss for those who are overweight may help reduce gout attacks.  Eat less of these foods  Eating too many foods containing purines may raise the levels of uric acid in your body. This raises your risk for a gout attack. Try to limit these foods and drinks:    Alcohol, such as beer and red wine. You may be told to avoid alcohol completely.    Soft drinks that contain sugar or high fructose corn syrup    Certain fish, including anchovies, sardines, fish eggs, and herring    Shellfish    Certain meats, such as red meat, hot dogs, luncheon meats, and turkey    Organ meats, such as liver, kidneys, and sweetbreads    Legumes, such as dried beans and peas    Other high fat foods such as gravy, whole milk, and high fat cheeses    Vegetables such as asparagus, cauliflower, spinach, and mushrooms used to be thought to contribute to an increased risk for a gout attack, but recent studies show that high purine vegetables don't increase the risk for a gout attack.  Eat more of these foods  Other foods may be helpful for people with gout. Add some of these foods to your diet:    Cherries contain chemicals that may lower uric acid.    Omega fatty acids. These are found in some fatty fish such as salmon, certain oils (flax, olive, or nut), and nuts themselves.  Omega fatty acids may help prevent inflammation due to gout.    Dairy products that are low-fat or fat-free, such as cheese and yogurt    Complex carbohydrate foods, including whole grains, brown rice, oats, and beans    Coffee, in moderation    Water, approximately 64 ounces per day  Follow-up care  Follow up with your healthcare provider as advised.  When to seek medical advice  Call your healthcare provider right away if any of these occur:    Return of gout symptoms, usually at night:    Severe pain, swelling, and heat in a joint, especially the base of the big toe    Affected joint is hard to move    Skin of the affected joint is purple or red    Fever of 100.4 F (38 C) or higher    Pain that doesn't get better even with prescribed medicine   Date Last Reviewed: 1/12/2016 2000-2017 [employer]. [address] [place] [id number]. All rights reserved. This information is not intended as a substitute for professional medical care. Always follow your healthcare professional's instructions.        Low-Cholesterol Diet  Your body needs cholesterol to build new cells and create certain hormones. There are 2 kinds of cholesterol in your blood:       HDL ( good ) cholesterol. This prevents fat deposits (plaque) from building up in your arteries. In this way it protects against heart disease and stroke.    LDL ( bad ) cholesterol. This stays in your body and sticks to artery walls. Over time it may block blood flow to the heart and brain. This can cause a heart attack or stroke.  The cholesterol in your blood comes from 2 sources: cholesterol in food that you eat and cholesterol that your liver makes. You should limit the amount of cholesterol in your diet. But the cholesterol that your body makes has the greatest disease risk. And your body makes more cholesterol when your diet is high in bad fats (saturated and trans fats). There are 2 kinds of fats you can eat:    Good fats, or unsaturated  fats (mono-unsaturated and poly-unsaturated). They raise the level of good cholesterol and lower the level of bad cholesterol. Good fats are found in vegetable oils such as olive, sunflower, corn, and soybean oils, and in nuts and seeds.    Bad fats, or saturated fats (including foods high in cholesterol) and trans fats. These raise your risk of disease. They lower the good cholesterol and raise the level of bad cholesterol. Bad fats are found in animal products, including meat, whole-milk dairy products, and butter. Some plants are also high in bad fats (coconut and palm plants). Trans fats are found in hard (stick) margarines. They are also in many fast foods and commercially baked goods. Soft margarine sold in tubs has fewer trans fats and is safer to use.  High blood cholesterol is usually due to a diet high in saturated fat, along with not being physically active. In some cases, genetics plays a role in causing high cholesterol. The tips below will help you create healthy eating habits that will help lower your blood cholesterol level.  Create a diet high in good fats, low in bad fats (and low in cholesterol)  The following steps will help you create a diet high in good fats and low in bad fats:    Talk with your doctor before starting a low cholesterol diet or weight loss program.    Learn to read nutrition labels and select appropriate portion sizes.    When cooking, use plant-based unsaturated vegetable oils (sunflower, corn, soybean, canola, peanut, and olive oils).    Avoid saturated fats found in animal products such as meat, dairy (whole-milk, cheese and ice cream), poultry skin, and egg yolks. Plants high in saturated oils include coconut oil, palm oil, and palm kernel oil.    If you eat meat, choose smaller portions and lean cuts, such as round, ivonne, sirloin, or loin. Eat more meatless meals.    Replace meat with fish at least 2 times a week. Fish is an important source of the unsaturated fat called  omega-3 fatty acids. This fat has potential to lower the risk of heart disease.    Replace whole-milk dairy products with low-fat or nonfat products. Try soy products. Soy helps to reduce total cholesterol.    Supplement your diet with protective fibers. Eat nuts, seeds, and whole grains rather than white rice and bread. These foods lower both cholesterol and triglyceride levels. (Triglycerides are another fat found in the blood.) Walnuts are one of the best sources of omega-3 fatty acids.    Eat plenty of fresh fruits and vegetables daily.    Avoid fast foods and commercial baked goods. Assume they contain saturated fat unless labeled otherwise.  Date Last Reviewed: 8/1/2016 2000-2017 [employer]. [address] [place] [address], [place] [address]. All rights reserved. This information is not intended as a substitute for professional medical care. Always follow your healthcare professional's instructions.        Low-Fat Diet    A low-fat diet will help you lose weight. It also can lower cholesterol and prevent symptoms of gallbladder disease. The average American diet contains up to 50% fat. This means that 50% of all calories come from fat (about 80 grams to 100 grams of fat per day). Choosing normal portions of foods from the list below can help lower your fat intake. Experts recommend that only 20% to 35% of your daily calories come from fat. The remaining 65% to 80% of calories will come from protein and carbohydrates. This is much healthier for you.  Breads  Ok: Whole-wheat or rye bread, miguel or soda crackers, wade toast, plain rolls, bagels, English muffins  Avoid: Rolls and breads containing whole milk or egg; waffles, pancakes, biscuits, corn bread; cheese crackers, other flavored crackers, pastries, doughnuts  Cereals  Ok: Oatmeal, whole-wheat, bran, multigrain, rice  Avoid: Granola or other cereals that have oil, coconut, or more than 2 grams of fat per serving  Cheese and eggs  Ok: Cheeses  labeled low-fat; 3 whole eggs per week; egg whites and egg substitutes as desired  Avoid: All other cheeses  Desserts  Ok: Gelatin, slushy, shirley food cake, meringues, nonfat yogurt, and puddings or sherbet made with nonfat milk  Avoid: Any other store-bought desserts, or desserts that have fat, whole milk, cream, chocolate, and coconut  Drinks  Ok: Nonfat milk, coffee, tea, fizzy (carbonated) drinks  Avoid: Whole and reduced-fat milk, evaporated and condensed milk, hot chocolate mixes, milk shakes, malts, eggnog  Fats  Ok: You may have up to 3 teaspoons of fat daily. This can be butter, margarine, mayonnaise, or healthy oils (canola or olive)  Avoid: Cream, nondairy creams, cream cheese, gravies, and cream sauces  Fruits  Ok: All fruits made without fat  Avoid: Coconut, olives  Meats, poultry, fish  Ok: Limit meat to 6 ounces daily (broiled, roasted, baked, grilled, or boiled). Buy lean cuts, and trim off the fat. Try beef, fish, lamb, pork, and canned fish packed in water; also chicken and turkey with the skin removed.  Avoid: Fried meats, fish, or poultry; fried eggs, and fish canned in oils; fatty meats such as feng, sausage, corned beef, hot dogs, and lunch meats; meats with gravies and sauces  Potatoes, beans, pasta  Ok: Dried beans, split peas, lentils, potatoes, rice, pasta made without added fat  Avoid: French fries, potato chips, potatoes prepared with butter, refried beans  Soups  Ok: Clear broth soups without fat and with allowed vegetables  Avoid: Cream-based soups  Vegetables  Ok: Fresh, frozen, canned or dried vegetables, all made without added fat  Avoid: Fried vegetables and those prepared with butter, cream, sauces  Miscellaneous  Ok: Salt, sugar, jelly, hard candy, marshmallows, honey, syrup, spices and herbs, mustard, ketchup, lemon, and vinegar. Try to limit sweets and added sugars.  Avoid: Chocolate, nuts, coconut, and cream candies; sunflower, sesame, and other seeds; fried foods; cream  sauces and gravies; pizza  Date Last Reviewed: 8/1/2016 2000-2017 [employer]. [address] [place] [address], [place] [address]. All rights reserved. This information is not intended as a substitute for professional medical care. Always follow your healthcare professional's instructions.        Nutrition and MyPlate: Oils    Oils are fats that are liquid at room temperature. This food group includes oils you cook with, plus foods that are mostly oil, such as mayonnaise and salad dressing. Oils give the body vitamin E and essential fatty acids, which keep cells and tissues healthy and help the body heal. But oils and other fats are high in calories. Eating too much fat leads to weight gain and increased risk of heart disease.  Fat facts  Some fats are liquid. Others are solid. And all of them can be bad for you if you eat too much. Food labels tell you which fats a food contains. Some are healthier than others:    Unsaturated fats are found in some oils (such as olive, peanut, and canola), nuts, seeds, and fish. These are the healthiest fats. They can be good for your heart in moderate amounts.    Saturated fats are found in animal foods such as butter, lard, beef, and high-fat dairy. These are less healthy, and should be limited.    Trans fats are found in some fast food, such as French fries, snack foods (like chips and cookies), and some margarines and shortenings. These are the worst fats for you. Avoid them when you can.  Be smart about fats    Out with the Bad: Check food labels for trans fats. And stay away from foods that have them. Trans fats are mostly found in processed foods. So, choose unprocessed foods more often.    In with the Good: Choose unsaturated fat over saturated when you can. Here's one idea: Use olive or canola oil instead of lard or butter. What else could you  do?  ___________________________________________________________________     ___________________________________________________________________  Date Last Reviewed: 6/25/2015 2000-2017 [employer]. [address] [place] [address], [place] [address]. All rights reserved. This information is not intended as a substitute for professional medical care. Always follow your healthcare professional's instructions.

## 2018-02-15 NOTE — PROGRESS NOTES
SUBJECTIVE:   Sen Watkins is a 57 year old male who presents to clinic today for the following health issues:    Ingrown toenail  Feeling pretty good hurts yet. Hard to get his shoes on yet.     Right leg.. Stiffening up from the back down to the right big toe.   Has gout in the right great toe.. Flared 3-4 days ago.   No recent lifting or bending. Takes 2-3 days to recover from any physical activity   Not napping. Body doesn't copperate the next day. Feels really fatigued.   Reading more but not as active. Having a hard time with concentration.   Sleep 4 am wake up.. Goes to bed at 1030 pm.   Occasional use of melatonin. Quiet meditation in the morning helps.   Plans to return to work as a  someday.   S/p acute ischemic stroke 2015   Physical Therapy for quiet a while. Right side affected. Right leg and right arm stiffness ongoing.       Medication Followup of Voltaren 50 mg     Taking Medication as prescribed: yes    Side Effects:  None    Medication Helping Symptoms:  Yes        Would like to get off this medication     Tried to take him off but was having a lot of side effect with weaning off.  Holliday and fatigue     Pain scale today 7/10     Gout and left shoulder pain     Tried to wean off and couldn't stop being really irritable.        coffee in the morning... Eating healthy   Force  Himself to eat.. Less vomitting. More stress the more problems with vomitting.     Would like to start herbal meds for pain instead   Would like to hold off on labs today. Needs to emotionally prepare for the blood draw.. Would like to come back for that.       has a past medical history of Acute idiopathic gout of right foot (6/2/2016); Acute ischemic stroke (H) (6/2/2016); Intermittent asthma (4/25/2011); Nephrolithiasis (4/25/2011); Occipital stroke (H) (7/7/2016); and Thalamic infarct, acute (H) (7/7/2016).    -------------------------------------    Problem list and histories reviewed & adjusted, as  "indicated.  Additional history: as documented    Labs reviewed in EPIC    Reviewed and updated as needed this visit by clinical staff  Allergies  Meds       Reviewed and updated as needed this visit by Provider         ROS:   ROS: 10 point ROS neg other than the symptoms noted above in the HPI.      OBJECTIVE:                                                    /60  Pulse 60  Temp 97.3  F (36.3  C) (Tympanic)  Resp 14  Ht 5' 6.5\" (1.689 m)  Wt 163 lb (73.9 kg)  SpO2 99%  BMI 25.91 kg/m2  Body mass index is 25.91 kg/(m^2).   GENERAL: healthy, alert, well nourished, well hydrated, no distress  HENT: ear canals- normal; TMs- normal; Nose- normal; Mouth- no ulcers, no lesions  NECK: no tenderness, no adenopathy, no asymmetry, no masses, no stiffness; thyroid- normal to palpation  RESP: lungs clear to auscultation - no rales, no rhonchi, no wheezes  CV: regular rates and rhythm, normal S1 S2, no S3 or S4 and no murmur, no click or rub -  ABDOMEN: soft, no tenderness, no  hepatosplenomegaly, no masses, normal bowel sounds  MS: extremities- no gross deformities noted, no edema  PSYCH: Alert and oriented times 3; speech- coherent , normal rate and volume; able to articulate logical thoughts, able to abstract reason, no tangential thoughts, no hallucinations or delusions, affect- normal    Diagnostic test results:  Labs declined today     ASSESSMENT/PLAN:                                                    1. Acute ischemic stroke (H)-6. Occipital stroke (H)7. Thalamic infarct, acute (H)  Future orders placed for labs.  Neuropsych evaluation recommended.  Follow-up with neurology recommended to help with work recommendations and Occupational Therapy recommendations  Continue statin.  Continue aspirin.    - Lipid panel reflex to direct LDL Fasting; Future  - CBC with platelets; Future  - Comprehensive metabolic panel; Future  - NUTRITION REFERRAL  - NEUROPSYCHOLOGY REFERRAL      2. Acute idiopathic gout of right " foot  - Uric acid; Future  Refilled   - diclofenac (VOLTAREN) 50 MG EC tablet; TAKE ONE TABLET BY MOUTH THREE TIMES A DAY AS NEEDED FOR MODERATE PAIN  Dispense: 90 tablet; Refill: 5  - NUTRITION REFERRAL    3. Ingrowing left great toenail  Symptomatic care strategies reviewed.  Follow-up with podiatry with ongoing symptoms    4. Encounter for hepatitis C screening test for low risk patient  Labs ordered  - **Hepatitis C Screen Reflex to RNA FUTURE anytime; Future    5. Idiopathic chronic gout of multiple sites without tophus  Refilled   - allopurinol (ZYLOPRIM) 100 MG tablet; Take 1 tablet (100 mg) by mouth daily  Dispense: 90 tablet; Refill: 1        Follow up with Provider -return to the clinic for labs.  Orders placed.  Neurology recommended.  Neuropsych evaluation recommended.  If ongoing motor difficulties recommend physical therapy evaluation and treatment  See Patient Instructions    Call or return to the clinic with any worsening of symptoms or no resolution. Patient/Parent verbalized understanding and is in agreement. Medication side effects reviewed.   Current Outpatient Prescriptions   Medication Sig Dispense Refill     diclofenac (VOLTAREN) 50 MG EC tablet TAKE ONE TABLET BY MOUTH THREE TIMES A DAY AS NEEDED FOR MODERATE PAIN 90 tablet 5     allopurinol (ZYLOPRIM) 100 MG tablet Take 1 tablet (100 mg) by mouth daily 90 tablet 1     atorvastatin (LIPITOR) 40 MG tablet Take 1 tablet (40 mg) by mouth daily 90 tablet 2     HERBALS        Multiple Vitamins-Iron (STRESS B COMPLEX/IRON) TABS Take 1 tablet by mouth daily 100 tablet 3     aspirin EC 81 MG EC tablet Take 1 tablet (81 mg) by mouth daily       Chart documentation with Dragon Voice recognition Software. Although reviewed after completion, some words and grammatical errors may remain.      MYRA Craig [facility]

## 2018-02-15 NOTE — MR AVS SNAPSHOT
After Visit Summary   2/15/2018    Sen Watkins    MRN: [id number]           Patient Information     Date Of Birth          1960        Visit Information        Provider Department      2/15/2018 9:00 AM Maria Dolores Krishnamurthy APRN [facility]        Today's Diagnoses     Acute ischemic stroke (H)    -  1    Acute idiopathic gout of right foot        Ingrowing left great toenail        Encounter for hepatitis C screening test for low risk patient        Idiopathic chronic gout of multiple sites without tophus        Occipital stroke (H)        Thalamic infarct, acute (H)          Care Instructions    [email or web address]  Consider consult with  Dr Gomez at [facility] if you would like to look into herbal medicine.         Gout Diet  Gout is a painful condition caused by an excess of uric acid, a waste product made by the body. Uric acid forms crystals that collect in the joints. The immune response to these crystals brings on symptoms of joint pain and swelling. This is called a gout attack. Often, medications and diet changes are combined to manage gout. Below are some guidelines for changing your diet to help you manage gout and prevent attacks. Your health care provider will help you determine the best eating plan for you.     Eating to manage gout  Weight loss for those who are overweight may help reduce gout attacks.  Eat less of these foods  Eating too many foods containing purines may raise the levels of uric acid in your body. This raises your risk for a gout attack. Try to limit these foods and drinks:    Alcohol, such as beer and red wine. You may be told to avoid alcohol completely.    Soft drinks that contain sugar or high fructose corn syrup    Certain fish, including anchovies, sardines, fish eggs, and herring    Shellfish    Certain meats, such as red meat, hot dogs, luncheon meats, and turkey    Organ meats, such as liver, kidneys, and  sweetbreads    Legumes, such as dried beans and peas    Other high fat foods such as gravy, whole milk, and high fat cheeses    Vegetables such as asparagus, cauliflower, spinach, and mushrooms used to be thought to contribute to an increased risk for a gout attack, but recent studies show that high purine vegetables don't increase the risk for a gout attack.  Eat more of these foods  Other foods may be helpful for people with gout. Add some of these foods to your diet:    Cherries contain chemicals that may lower uric acid.    Omega fatty acids. These are found in some fatty fish such as salmon, certain oils (flax, olive, or nut), and nuts themselves. Omega fatty acids may help prevent inflammation due to gout.    Dairy products that are low-fat or fat-free, such as cheese and yogurt    Complex carbohydrate foods, including whole grains, brown rice, oats, and beans    Coffee, in moderation    Water, approximately 64 ounces per day  Follow-up care  Follow up with your healthcare provider as advised.  When to seek medical advice  Call your healthcare provider right away if any of these occur:    Return of gout symptoms, usually at night:    Severe pain, swelling, and heat in a joint, especially the base of the big toe    Affected joint is hard to move    Skin of the affected joint is purple or red    Fever of 100.4 F (38 C) or higher    Pain that doesn't get better even with prescribed medicine   Date Last Reviewed: 1/12/2016 2000-2017 [employer]. [address] [place] [address]. All rights reserved. This information is not intended as a substitute for professional medical care. Always follow your healthcare professional's instructions.        Low-Cholesterol Diet  Your body needs cholesterol to build new cells and create certain hormones. There are 2 kinds of cholesterol in your blood:       HDL ( good ) cholesterol. This prevents fat deposits (plaque) from building up in your arteries.  In this way it protects against heart disease and stroke.    LDL ( bad ) cholesterol. This stays in your body and sticks to artery walls. Over time it may block blood flow to the heart and brain. This can cause a heart attack or stroke.  The cholesterol in your blood comes from 2 sources: cholesterol in food that you eat and cholesterol that your liver makes. You should limit the amount of cholesterol in your diet. But the cholesterol that your body makes has the greatest disease risk. And your body makes more cholesterol when your diet is high in bad fats (saturated and trans fats). There are 2 kinds of fats you can eat:    Good fats, or unsaturated fats (mono-unsaturated and poly-unsaturated). They raise the level of good cholesterol and lower the level of bad cholesterol. Good fats are found in vegetable oils such as olive, sunflower, corn, and soybean oils, and in nuts and seeds.    Bad fats, or saturated fats (including foods high in cholesterol) and trans fats. These raise your risk of disease. They lower the good cholesterol and raise the level of bad cholesterol. Bad fats are found in animal products, including meat, whole-milk dairy products, and butter. Some plants are also high in bad fats (coconut and palm plants). Trans fats are found in hard (stick) margarines. They are also in many fast foods and commercially baked goods. Soft margarine sold in tubs has fewer trans fats and is safer to use.  High blood cholesterol is usually due to a diet high in saturated fat, along with not being physically active. In some cases, genetics plays a role in causing high cholesterol. The tips below will help you create healthy eating habits that will help lower your blood cholesterol level.  Create a diet high in good fats, low in bad fats (and low in cholesterol)  The following steps will help you create a diet high in good fats and low in bad fats:    Talk with your doctor before starting a low cholesterol diet or  weight loss program.    Learn to read nutrition labels and select appropriate portion sizes.    When cooking, use plant-based unsaturated vegetable oils (sunflower, corn, soybean, canola, peanut, and olive oils).    Avoid saturated fats found in animal products such as meat, dairy (whole-milk, cheese and ice cream), poultry skin, and egg yolks. Plants high in saturated oils include coconut oil, palm oil, and palm kernel oil.    If you eat meat, choose smaller portions and lean cuts, such as round, ivonne, sirloin, or loin. Eat more meatless meals.    Replace meat with fish at least 2 times a week. Fish is an important source of the unsaturated fat called omega-3 fatty acids. This fat has potential to lower the risk of heart disease.    Replace whole-milk dairy products with low-fat or nonfat products. Try soy products. Soy helps to reduce total cholesterol.    Supplement your diet with protective fibers. Eat nuts, seeds, and whole grains rather than white rice and bread. These foods lower both cholesterol and triglyceride levels. (Triglycerides are another fat found in the blood.) Walnuts are one of the best sources of omega-3 fatty acids.    Eat plenty of fresh fruits and vegetables daily.    Avoid fast foods and commercial baked goods. Assume they contain saturated fat unless labeled otherwise.  Date Last Reviewed: 8/1/2016 2000-2017 [employer]. [address] [place] [address]. All rights reserved. This information is not intended as a substitute for professional medical care. Always follow your healthcare professional's instructions.        Low-Fat Diet    A low-fat diet will help you lose weight. It also can lower cholesterol and prevent symptoms of gallbladder disease. The average American diet contains up to 50% fat. This means that 50% of all calories come from fat (about 80 grams to 100 grams of fat per day). Choosing normal portions of foods from the list below can help lower your  fat intake. Experts recommend that only 20% to 35% of your daily calories come from fat. The remaining 65% to 80% of calories will come from protein and carbohydrates. This is much healthier for you.  Breads  Ok: Whole-wheat or rye bread, miguel or soda crackers, wade toast, plain rolls, bagels, English muffins  Avoid: Rolls and breads containing whole milk or egg; waffles, pancakes, biscuits, corn bread; cheese crackers, other flavored crackers, pastries, doughnuts  Cereals  Ok: Oatmeal, whole-wheat, bran, multigrain, rice  Avoid: Granola or other cereals that have oil, coconut, or more than 2 grams of fat per serving  Cheese and eggs  Ok: Cheeses labeled low-fat; 3 whole eggs per week; egg whites and egg substitutes as desired  Avoid: All other cheeses  Desserts  Ok: Gelatin, slushy, shirley food cake, meringues, nonfat yogurt, and puddings or sherbet made with nonfat milk  Avoid: Any other store-bought desserts, or desserts that have fat, whole milk, cream, chocolate, and coconut  Drinks  Ok: Nonfat milk, coffee, tea, fizzy (carbonated) drinks  Avoid: Whole and reduced-fat milk, evaporated and condensed milk, hot chocolate mixes, milk shakes, malts, eggnog  Fats  Ok: You may have up to 3 teaspoons of fat daily. This can be butter, margarine, mayonnaise, or healthy oils (canola or olive)  Avoid: Cream, nondairy creams, cream cheese, gravies, and cream sauces  Fruits  Ok: All fruits made without fat  Avoid: Coconut, olives  Meats, poultry, fish  Ok: Limit meat to 6 ounces daily (broiled, roasted, baked, grilled, or boiled). Buy lean cuts, and trim off the fat. Try beef, fish, lamb, pork, and canned fish packed in water; also chicken and turkey with the skin removed.  Avoid: Fried meats, fish, or poultry; fried eggs, and fish canned in oils; fatty meats such as feng, sausage, corned beef, hot dogs, and lunch meats; meats with gravies and sauces  Potatoes, beans, pasta  Ok: Dried beans, split peas, lentils, potatoes,  rice, pasta made without added fat  Avoid: French fries, potato chips, potatoes prepared with butter, refried beans  Soups  Ok: Clear broth soups without fat and with allowed vegetables  Avoid: Cream-based soups  Vegetables  Ok: Fresh, frozen, canned or dried vegetables, all made without added fat  Avoid: Fried vegetables and those prepared with butter, cream, sauces  Miscellaneous  Ok: Salt, sugar, jelly, hard candy, marshmallows, honey, syrup, spices and herbs, mustard, ketchup, lemon, and vinegar. Try to limit sweets and added sugars.  Avoid: Chocolate, nuts, coconut, and cream candies; sunflower, sesame, and other seeds; fried foods; cream sauces and gravies; pizza  Date Last Reviewed: 8/1/2016 2000-2017 [employer]. [address] [place] [address]. All rights reserved. This information is not intended as a substitute for professional medical care. Always follow your healthcare professional's instructions.        Nutrition and MyPlate: Oils    Oils are fats that are liquid at room temperature. This food group includes oils you cook with, plus foods that are mostly oil, such as mayonnaise and salad dressing. Oils give the body vitamin E and essential fatty acids, which keep cells and tissues healthy and help the body heal. But oils and other fats are high in calories. Eating too much fat leads to weight gain and increased risk of heart disease.  Fat facts  Some fats are liquid. Others are solid. And all of them can be bad for you if you eat too much. Food labels tell you which fats a food contains. Some are healthier than others:    Unsaturated fats are found in some oils (such as olive, peanut, and canola), nuts, seeds, and fish. These are the healthiest fats. They can be good for your heart in moderate amounts.    Saturated fats are found in animal foods such as butter, lard, beef, and high-fat dairy. These are less healthy, and should be limited.    Trans fats are found in some fast  food, such as French fries, snack foods (like chips and cookies), and some margarines and shortenings. These are the worst fats for you. Avoid them when you can.  Be smart about fats    Out with the Bad: Check food labels for trans fats. And stay away from foods that have them. Trans fats are mostly found in processed foods. So, choose unprocessed foods more often.    In with the Good: Choose unsaturated fat over saturated when you can. Here's one idea: Use olive or canola oil instead of lard or butter. What else could you do?  ___________________________________________________________________     ___________________________________________________________________  Date Last Reviewed: 6/25/2015 2000-2017 [employer]. [address] [place] [address]. All rights reserved. This information is not intended as a substitute for professional medical care. Always follow your healthcare professional's instructions.                Follow-ups after your visit        Additional Services     NEUROPSYCHOLOGY REFERRAL       Your provider has referred you to:    [facility]: [facility], Raine Frausto [place] [phone]   [email or web address]    All scheduling is subject to the client's specific insurance plan & benefits, provider/location availability, and provider clinical specialities.  Please arrive 15 minutes early for your first appointment and bring your completed paperwork.    Please be aware that coverage of these services is subject to the terms and limitations of your health insurance plan.  Call member services at your health plan with any benefit or coverage questions.    Please bring the following to your appointment:  >>   Any x-rays, CTs or MRIs which have been performed.  Contact the facility where they were done to arrange for  prior to your scheduled appointment.  Any new CT, MRI or other procedures ordered by your specialist must be performed at a  [facility] or coordinated by your clinic's referral office.    >>   List of current medications   >>   This referral request   >>   Any documents/labs given to you for this referral            NUTRITION REFERRAL       Your provider has referred you to: [facility]: [facility] [phone]   [email or web address]    Please be aware that coverage of these services is subject to the terms and limitations of your health insurance plan.  Call member services at your health plan with any benefit or coverage questions.      Please bring the following with you to your appointment:    (1) This referral request  (2) Any documents given to you regarding this referral  (3) Any specific questions you have about diet and/or food choices                  Future tests that were ordered for you today     Open Future Orders        Priority Expected Expires Ordered    Lipid panel reflex to direct LDL Fasting Routine  5/15/2018 2/15/2018    CBC with platelets Routine  5/15/2018 2/15/2018    Comprehensive metabolic panel Routine  5/15/2018 2/15/2018    Uric acid Routine  5/15/2018 2/15/2018    **Hepatitis C Screen Reflex to RNA FUTURE anytime Routine 2/15/2018 2/15/2019 2/15/2018            Who to contact     If you have questions or need follow up information about today's clinic visit or your schedule please contact [facility] directly at [phone].  Normal or non-critical lab and imaging results will be communicated to you by MyChart, letter or phone within 4 business days after the clinic has received the results. If you do not hear from us within 7 days, please contact the clinic through MyChart or phone. If you have a critical or abnormal lab result, we will notify you by phone as soon as possible.  Submit refill requests through [employer] or call your pharmacy and they will forward the refill request to us. Please allow 3 business days for your refill to be completed.     "      Additional Information About Your Visit        MyChart Information     [employer] lets you send messages to your doctor, view your test results, renew your prescriptions, schedule appointments and more. To sign up, go to [email or web address] . Click on \"Log in\" on the left side of the screen, which will take you to the Welcome page. Then click on \"Sign up Now\" on the right side of the page.     You will be asked to enter the access code listed below, as well as some personal information. Please follow the directions to create your username and password.     Your access code is: [email or web address]  Expires: 2018  2:51 PM     Your access code will  in 90 days. If you need help or a new code, please call your [place] clinic or [phone].        Care EveryWhere ID     This is your Care EveryWhere ID. This could be used by other organizations to access your [place] medical records  [id number]        Your Vitals Were     Pulse Temperature Respirations Height Pulse Oximetry BMI (Body Mass Index)    60 97.3  F (36.3  C) (Tympanic) 14 5' 6.5\" (1.689 m) 99% 25.91 kg/m2       Blood Pressure from Last 3 Encounters:   02/15/18 120/60   10/12/17 121/85   17 120/68    Weight from Last 3 Encounters:   02/15/18 163 lb (73.9 kg)   18 171 lb 1.6 oz (77.6 kg)   10/12/17 162 lb (73.5 kg)              We Performed the Following     Asthma Action Plan (AAP)     NEUROPSYCHOLOGY REFERRAL     NUTRITION REFERRAL          Today's Medication Changes          These changes are accurate as of 2/15/18  9:49 AM.  If you have any questions, ask your nurse or doctor.               These medicines have changed or have updated prescriptions.        Dose/Directions    allopurinol 100 MG tablet   Commonly known as:  ZYLOPRIM   This may have changed:  See the new instructions.   Used for:  Idiopathic chronic gout of multiple sites without tophus   Changed by:  Maria Dolores Krishnamurthy APRN CNP        Dose:  100 mg   Take 1 " tablet (100 mg) by mouth daily   Quantity:  90 tablet   Refills:  1       atorvastatin 40 MG tablet   Commonly known as:  LIPITOR   This may have changed:  See the new instructions.   Used for:  Occipital stroke (H), Thalamic infarct, acute (H)   Changed by:  Maria Dolores Krishnamurthy APRN CNP        Dose:  40 mg   Take 1 tablet (40 mg) by mouth daily   Quantity:  90 tablet   Refills:  2       diclofenac 50 MG EC tablet   Commonly known as:  VOLTAREN   This may have changed:  See the new instructions.   Used for:  Acute idiopathic gout of right foot   Changed by:  Maria Dolores Krishnamurthy APRN CNP        TAKE ONE TABLET BY MOUTH THREE TIMES A DAY AS NEEDED FOR MODERATE PAIN   Quantity:  90 tablet   Refills:  5            Where to get your medicines      These medications were sent to [facility] [address] [facility]  [address] [facility] [address]     Phone:  [phone]     allopurinol 100 MG tablet    atorvastatin 40 MG tablet    diclofenac 50 MG EC tablet                Primary Care Provider Office Phone # Fax #    MYRA Craig [phone]       [address] [place] [address]        Equal Access to Services     [facility] Center[facility] : Hadii braulio ku hadasho Soomaali, waaxda luqadaha, qaybta kaalmada adeegyada, sanaz frey . So [facility] [phone].    ATENCIÓN: Si habla español, tiene a cordova disposición servicios gratuitos de asistencia lingüística. [facility] [phone].    We comply with applicable federal civil rights laws and [place] laws. We do not discriminate on the basis of race, color, national origin, age, disability, sex, sexual orientation, or gender identity.            Thank you!     Thank you for choosing [facility][note 3]  for your care. Our goal is always to provide you with excellent care. Hearing back from our patients is one way we can continue to improve our services. Please take a few minutes to  complete the written survey that you may receive in the mail after your visit with us. Thank you!             Your Updated Medication List - Protect others around you: Learn how to safely use, store and throw away your medicines at www.disposemymeds.org.          This list is accurate as of 2/15/18  9:49 AM.  Always use your most recent med list.                   Brand Name Dispense Instructions for use Diagnosis    allopurinol 100 MG tablet    ZYLOPRIM    90 tablet    Take 1 tablet (100 mg) by mouth daily    Idiopathic chronic gout of multiple sites without tophus       aspirin 81 MG EC tablet      Take 1 tablet (81 mg) by mouth daily    Cerebrovascular accident (CVA), unspecified mechanism (H)       atorvastatin 40 MG tablet    LIPITOR    90 tablet    Take 1 tablet (40 mg) by mouth daily    Occipital stroke (H), Thalamic infarct, acute (H)       diclofenac 50 MG EC tablet    VOLTAREN    90 tablet    TAKE ONE TABLET BY MOUTH THREE TIMES A DAY AS NEEDED FOR MODERATE PAIN    Acute idiopathic gout of right foot       HERBALS           STRESS B COMPLEX/IRON Tabs     100 tablet    Take 1 tablet by mouth daily

## 2018-02-15 NOTE — LETTER
[facility]  [address] [facility] [address]  [phone]        May 21, 2018    Sen Watkins  [address]   [facility] [address]              Dear Sen Watkins    This is to remind you that your provider wanted you to return to the clinic for fasting lab test(s),    please fast for 10-12 hours. Morning medications can be taken with water.    You may call our office at [phone] to schedule an appointment.    Please disregard this notice if you have already had your labs drawn or made an appointment.          Sincerely,        Maria Dolores Krishnamurthy CNP

## 2018-03-14 DIAGNOSIS — M10.071 ACUTE IDIOPATHIC GOUT OF RIGHT FOOT: ICD-10-CM

## 2018-03-14 NOTE — TELEPHONE ENCOUNTER
"Requested Prescriptions   Pending Prescriptions Disp Refills     diclofenac (VOLTAREN) 50 MG EC tablet [Pharmacy Med Name: DICLOFENAC SODIUM 50MG TBEC] 90 tablet 0     Sig: TAKE ONE TABLET BY MOUTH THREE TIMES A DAY AS NEEDED FOR MODERATE PAIN    NSAID Medications Failed    3/14/2018  9:47 AM       Failed - Normal ALT on file in past 12 months    Recent Labs   Lab Test  10/12/17   2100   ALT  92*            Passed - Blood pressure under 140/90 in past 12 months    BP Readings from Last 3 Encounters:   02/15/18 120/60   10/12/17 121/85   09/26/17 120/68                Passed - Normal AST on file in past 12 months    Recent Labs   Lab Test  10/12/17   2100   AST  51*            Passed - Recent (12 mo) or future (30 days) visit within the authorizing provider's specialty    Patient had office visit in the last 12 months or has a visit in the next 30 days with authorizing provider or within the authorizing provider's specialty.  See \"Patient Info\" tab in inbasket, or \"Choose Columns\" in Meds & Orders section of the refill encounter.           Passed - Patient is age 6-64 years       Passed - Normal CBC on file in past 12 months    Recent Labs   Lab Test  10/12/17   2100   WBC  8.1   RBC  5.00   HGB  15.1   HCT  44.2   PLT  250            Passed - Normal serum creatinine on file in past 12 months    Recent Labs   Lab Test  10/12/17   2100   CR  0.95             Last Written Prescription Date:  2/15/18  Last Fill Quantity: 60,  # refills: 0   Last office visit: 2/15/2018 with prescribing provider:     Future Office Visit:      "

## 2018-06-27 ENCOUNTER — TELEPHONE (OUTPATIENT)
Dept: FAMILY MEDICINE | Facility: CLINIC | Age: 58
End: 2018-06-27

## 2018-06-27 NOTE — TELEPHONE ENCOUNTER
Patient was told to return for fasting labs, reminder letter was sent to patient on 05/21/2018, patient has still not scheduled an appointment.

## 2018-09-19 DIAGNOSIS — M1A.09X0 IDIOPATHIC CHRONIC GOUT OF MULTIPLE SITES WITHOUT TOPHUS: ICD-10-CM

## 2018-09-19 DIAGNOSIS — M10.071 ACUTE IDIOPATHIC GOUT OF RIGHT FOOT: ICD-10-CM

## 2018-09-19 RX ORDER — ALLOPURINOL 100 MG/1
TABLET ORAL
Qty: 90 TABLET | Refills: 0 | Status: SHIPPED | OUTPATIENT
Start: 2018-09-19 | End: 2018-11-20

## 2018-09-19 NOTE — TELEPHONE ENCOUNTER
"Requested Prescriptions   Pending Prescriptions Disp Refills     allopurinol (ZYLOPRIM) 100 MG tablet [Pharmacy Med Name: ALLOPURINOL 100MG TABS] 90 tablet 1     Sig: TAKE ONE TABLET BY MOUTH EVERY DAY    Gout Agents Protocol Failed    9/19/2018  8:46 AM       Failed - Has Uric Acid on file in past 12 months and value is less than 6    Recent Labs   Lab Test  01/02/17   1059   URIC  4.6     If level is 6mg/dL or greater, ok to refill one time and refer to provider.          Passed - CBC on file in past 12 months    Recent Labs   Lab Test  10/12/17   2100   WBC  8.1   RBC  5.00   HGB  15.1   HCT  44.2   PLT  250       For [facility] ONLY: [id number] = WBC, [id number] = RBC         Passed - ALT on file in past 12 months    Recent Labs   Lab Test  10/12/17   2100   ALT  92*            Passed - Recent (12 mo) or future (30 days) visit within the authorizing provider's specialty    Patient had office visit in the last 12 months or has a visit in the next 30 days with authorizing provider or within the authorizing provider's specialty.  See \"Patient Info\" tab in inbasket, or \"Choose Columns\" in Meds & Orders section of the refill encounter.      Last Written Prescription Date:  2/15/18  Last Fill Quantity: 90,  # refills: 1   Last office visit: 2/15/2018 with prescribing provider:     Future Office Visit:             Passed - Patient is age 18 or older       Passed - Normal serum creatinine on file in the past 12 months    Recent Labs   Lab Test  10/12/17   2100   CR  0.95             diclofenac (VOLTAREN) 50 MG EC tablet [Pharmacy Med Name: DICLOFENAC SODIUM 50MG TBEC] 90 tablet 5     Sig: TAKE ONE TABLET BY MOUTH THREE TIMES A DAY AS NEEDED FOR MODERATE PAIN    NSAID Medications Failed    9/19/2018  8:46 AM       Failed - Normal ALT on file in past 12 months    Recent Labs   Lab Test  10/12/17   2100   ALT  92*            Failed - Normal AST on file in past 12 months    Recent Labs   Lab Test  10/12/17   2100   AST  51*            " "Passed - Blood pressure under 140/90 in past 12 months    BP Readings from Last 3 Encounters:   02/15/18 120/60   10/12/17 121/85   09/26/17 120/68                Passed - Recent (12 mo) or future (30 days) visit within the authorizing provider's specialty    Patient had office visit in the last 12 months or has a visit in the next 30 days with authorizing provider or within the authorizing provider's specialty.  See \"Patient Info\" tab in inbasket, or \"Choose Columns\" in Meds & Orders section of the refill encounter.           Passed - Patient is age 6-64 years       Passed - Normal CBC on file in past 12 months    Recent Labs   Lab Test  10/12/17   2100   WBC  8.1   RBC  5.00   HGB  15.1   HCT  44.2   PLT  250       For [facility] ONLY: [id number] = WBC, [id number] = RBC         Passed - Normal serum creatinine on file in past 12 months    Recent Labs   Lab Test  10/12/17   2100   CR  0.95             Last Written Prescription Date:  3/14/18  Last Fill Quantity: 90,  # refills: 2   Last office visit: 2/15/2018 with prescribing provider:     Future Office Visit:      "

## 2018-11-07 ENCOUNTER — TELEPHONE (OUTPATIENT)
Dept: FAMILY MEDICINE | Facility: CLINIC | Age: 58
End: 2018-11-07

## 2018-11-07 NOTE — TELEPHONE ENCOUNTER
Reason for Call:  Requesting a letter for Jury Duty. Helen- wife is calling saying pt received a letter for Jury Duty. Could Maria Dolores write a letter as he is not able to do this. Pt has Anxiety /gets very Emotional does not feel he can do this. If there is stress in the room he can't handle it. Pt had a Mini stroke and unable to deal with stress since.    Phone Number Patient can be reached at: Other phone number:   Pt can be reached at [phone]    Best Time: Any Time      Can we leave a detailed message on this number? YES    Call taken on 11/7/2018 at 8:17 AM by Emi Atkinson    [phone] Helen

## 2018-11-08 NOTE — TELEPHONE ENCOUNTER
Would like to discuss this with him. OV or TV tomorrow on hold times is fine.  Thanks Maria Dolores Krishnamurthy P-BC

## 2018-11-20 ENCOUNTER — OFFICE VISIT (OUTPATIENT)
Dept: FAMILY MEDICINE | Facility: CLINIC | Age: 58
End: 2018-11-20
Payer: COMMERCIAL

## 2018-11-20 VITALS
HEART RATE: 80 BPM | RESPIRATION RATE: 14 BRPM | OXYGEN SATURATION: 99 % | TEMPERATURE: 97.3 F | SYSTOLIC BLOOD PRESSURE: 120 MMHG | WEIGHT: 165 LBS | DIASTOLIC BLOOD PRESSURE: 62 MMHG | BODY MASS INDEX: 26.23 KG/M2

## 2018-11-20 DIAGNOSIS — I63.9 OCCIPITAL STROKE (H): ICD-10-CM

## 2018-11-20 DIAGNOSIS — M10.071 ACUTE IDIOPATHIC GOUT OF RIGHT FOOT: ICD-10-CM

## 2018-11-20 DIAGNOSIS — I63.81 THALAMIC INFARCT, ACUTE (H): ICD-10-CM

## 2018-11-20 DIAGNOSIS — J45.20 MILD INTERMITTENT ASTHMA WITHOUT COMPLICATION: Primary | ICD-10-CM

## 2018-11-20 DIAGNOSIS — M1A.09X0 IDIOPATHIC CHRONIC GOUT OF MULTIPLE SITES WITHOUT TOPHUS: ICD-10-CM

## 2018-11-20 PROCEDURE — 99214 OFFICE O/P EST MOD 30 MIN: CPT | Performed by: NURSE PRACTITIONER

## 2018-11-20 RX ORDER — ALLOPURINOL 100 MG/1
100 TABLET ORAL DAILY
Qty: 90 TABLET | Refills: 1 | Status: SHIPPED | OUTPATIENT
Start: 2018-11-20 | End: 2019-06-24

## 2018-11-20 RX ORDER — ATORVASTATIN CALCIUM 40 MG/1
40 TABLET, FILM COATED ORAL DAILY
Qty: 90 TABLET | Refills: 2 | Status: SHIPPED | OUTPATIENT
Start: 2018-11-20 | End: 2019-08-02

## 2018-11-20 ASSESSMENT — PAIN SCALES - GENERAL: PAINLEVEL: MODERATE PAIN (4)

## 2018-11-20 NOTE — LETTER
November 20, 2018      Sen Watkins  [address]   [facility] [address]        To Whom It May Concern:    Sen Watkins  was seen on November 20, 2018.    Please excuse him from Jury Duty  due to previous stroke.        Sincerely,        .Maria Dolores Krishnamurthy MSN,[facility]-[address] [address] [phone]

## 2018-11-20 NOTE — PATIENT INSTRUCTIONS
Gout    Gout is an inflammation of a joint due to a build-up of gout crystals in the joint fluid. This occurs when there is an excess of uric acid (a normal waste product) in the body. Uric acid builds up in the body when the kidneys are unable to filter enough of it from the blood. This may occur with age. It is also associated with kidney disease. Gout occurs more often in people with obesity, diabetes, high blood pressure, or high levels of fats in the blood. It may run in families. Gout tends to come and go. A flare up of gout is called an attack. Drinking alcohol or eating certain foods (such as shellfish or foods with additives such as high-fructose corn syrup) may increase uric acid levels in the blood and cause a gout attack.  During a gout attack, the affected joint may become a hot, red, swollen and painful. If you have had one attack of gout, you are likely to have another. An attack of gout can be treated with medicine. If these attacks become frequent, a daily medicine may be prescribed to help the kidneys remove uric acid from the body.  Home care  During a gout attack:    Rest painful joints. If gout affects the joints of your foot or leg, you may want to use crutches for the first few days to keep from bearing weight on the affected joint.    When sitting or lying down, raise the painful joint to a level higher than your heart.    Apply an ice pack (ice cubes in a plastic bag wrapped in a thin towel) over the injured area for 20 minutes every 1 to 2 hours the first day for pain relief. Continue this 3 to 4 times a day for swelling and pain.    Avoid alcohol and foods listed below (see Preventing attacks) during a gout attack. Drink extra fluid to help flush the uric acid through your kidneys.    If you were prescribed a medicine to treat gout, take it as your healthcare provider has instructed. Don't skip doses.    Take anti-inflammatory medicine as directed.     If pain medicines have been  prescribed, take them exactly as directed.    Preventing attacks    Minimize or avoid alcohol use. Excess alcohol intake can cause a gout attack.    Limit these foods and beverages:  ? Organ meats, such as kidneys and liver  ? Certain seafoods (anchovies, sardines, shrimp, scallops, herring, mackerel)  ? Wild game, meat extracts and meat gravies  ? Foods and beverages sweetened with high-fructose corn syrup, such as sodas    Eat a healthy diet including low-fat and nonfat dairy, whole grains, and vegetables.    If you are overweight, talk to your healthcare provider about a weight reduction plan. Avoid fasting or extreme low calorie diets (less than 900 calories per day). This will increase uric acid levels in the body.    If you have diabetes or high blood pressure, work with your doctor to manage these conditions.    Protect the joint from injury. Trauma can trigger a gout attack.  Follow-up care  Follow up with your healthcare provider, or as advised.   When to seek medical advice  Call your healthcare provider if you have any of the following:    Fever over 100.4 F (38. C) with worsening joint pain    Increasing redness around the joint    Pain developing in another joint    Repeated vomiting, abdominal pain, or blood in the vomit or stool (black or red color)  Date Last Reviewed: 3/1/2017    6737-8754 [employer]. [address] [place] [address]. All rights reserved. This information is not intended as a substitute for professional medical care. Always follow your healthcare professional's instructions.        Tips to Control Acid Reflux    To control acid reflux, you ll need to make some basic diet and lifestyle changes. The simple steps outlined below may be all you ll need to ease discomfort.  Watch what you eat    Avoid fatty foods and spicy foods.    Eat fewer acidic foods, such as citrus and tomato-based foods. These can increase symptoms.    Limit drinking alcohol, caffeine, and  fizzy beverages. All increase acid reflux.    Try limiting chocolate, peppermint, and spearmint. These can worsen acid reflux in some people.  Watch when you eat    Avoid lying down for 3 hours after eating.    Do not snack before going to bed.  Raise your head  Raising your head and upper body by 4 to 6 inches helps limit reflux when you re lying down. Put blocks under the head of your bed frame to raise it.  Other changes    Lose weight, if you need to    Don t exercise near bedtime    Avoid tight-fitting clothes    Limit aspirin and ibuprofen    Stop smoking   Date Last Reviewed: 7/1/2016 2000-2018 [employer]. [address] [place] [address], [place] [address]. All rights reserved. This information is not intended as a substitute for professional medical care. Always follow your healthcare professional's instructions.

## 2018-11-20 NOTE — MR AVS SNAPSHOT
After Visit Summary   11/20/2018    Sen Watkins    MRN: [id number]           Patient Information     Date Of Birth          1960        Visit Information        Provider Department      11/20/2018 3:20 PM Maria Dolores Krishnamurthy APRN [facility]        Today's Diagnoses     Acute idiopathic gout of right foot        Occipital stroke (H)        Thalamic infarct, acute (H)        Idiopathic chronic gout of multiple sites without tophus          Care Instructions      Gout    Gout is an inflammation of a joint due to a build-up of gout crystals in the joint fluid. This occurs when there is an excess of uric acid (a normal waste product) in the body. Uric acid builds up in the body when the kidneys are unable to filter enough of it from the blood. This may occur with age. It is also associated with kidney disease. Gout occurs more often in people with obesity, diabetes, high blood pressure, or high levels of fats in the blood. It may run in families. Gout tends to come and go. A flare up of gout is called an attack. Drinking alcohol or eating certain foods (such as shellfish or foods with additives such as high-fructose corn syrup) may increase uric acid levels in the blood and cause a gout attack.  During a gout attack, the affected joint may become a hot, red, swollen and painful. If you have had one attack of gout, you are likely to have another. An attack of gout can be treated with medicine. If these attacks become frequent, a daily medicine may be prescribed to help the kidneys remove uric acid from the body.  Home care  During a gout attack:    Rest painful joints. If gout affects the joints of your foot or leg, you may want to use crutches for the first few days to keep from bearing weight on the affected joint.    When sitting or lying down, raise the painful joint to a level higher than your heart.    Apply an ice pack (ice cubes in a plastic bag wrapped in a  thin towel) over the injured area for 20 minutes every 1 to 2 hours the first day for pain relief. Continue this 3 to 4 times a day for swelling and pain.    Avoid alcohol and foods listed below (see Preventing attacks) during a gout attack. Drink extra fluid to help flush the uric acid through your kidneys.    If you were prescribed a medicine to treat gout, take it as your healthcare provider has instructed. Don't skip doses.    Take anti-inflammatory medicine as directed.     If pain medicines have been prescribed, take them exactly as directed.    Preventing attacks    Minimize or avoid alcohol use. Excess alcohol intake can cause a gout attack.    Limit these foods and beverages:  ? Organ meats, such as kidneys and liver  ? Certain seafoods (anchovies, sardines, shrimp, scallops, herring, mackerel)  ? Wild game, meat extracts and meat gravies  ? Foods and beverages sweetened with high-fructose corn syrup, such as sodas    Eat a healthy diet including low-fat and nonfat dairy, whole grains, and vegetables.    If you are overweight, talk to your healthcare provider about a weight reduction plan. Avoid fasting or extreme low calorie diets (less than 900 calories per day). This will increase uric acid levels in the body.    If you have diabetes or high blood pressure, work with your doctor to manage these conditions.    Protect the joint from injury. Trauma can trigger a gout attack.  Follow-up care  Follow up with your healthcare provider, or as advised.   When to seek medical advice  Call your healthcare provider if you have any of the following:    Fever over 100.4 F (38. C) with worsening joint pain    Increasing redness around the joint    Pain developing in another joint    Repeated vomiting, abdominal pain, or blood in the vomit or stool (black or red color)  Date Last Reviewed: 3/1/2017    [id number] [employer]. [address] [place] [address], [place] [address]. All rights reserved. This information  is not intended as a substitute for professional medical care. Always follow your healthcare professional's instructions.        Tips to Control Acid Reflux    To control acid reflux, you ll need to make some basic diet and lifestyle changes. The simple steps outlined below may be all you ll need to ease discomfort.  Watch what you eat    Avoid fatty foods and spicy foods.    Eat fewer acidic foods, such as citrus and tomato-based foods. These can increase symptoms.    Limit drinking alcohol, caffeine, and fizzy beverages. All increase acid reflux.    Try limiting chocolate, peppermint, and spearmint. These can worsen acid reflux in some people.  Watch when you eat    Avoid lying down for 3 hours after eating.    Do not snack before going to bed.  Raise your head  Raising your head and upper body by 4 to 6 inches helps limit reflux when you re lying down. Put blocks under the head of your bed frame to raise it.  Other changes    Lose weight, if you need to    Don t exercise near bedtime    Avoid tight-fitting clothes    Limit aspirin and ibuprofen    Stop smoking   Date Last Reviewed: 7/1/2016 2000-2018 [employer]. [address] [place] [address]. All rights reserved. This information is not intended as a substitute for professional medical care. Always follow your healthcare professional's instructions.                Follow-ups after your visit        Who to contact     If you have questions or need follow up information about today's clinic visit or your schedule please contact [facility] directly at [phone].  Normal or non-critical lab and imaging results will be communicated to you by MyChart, letter or phone within 4 business days after the clinic has received the results. If you do not hear from us within 7 days, please contact the clinic through MyChart or phone. If you have a critical or abnormal lab result, we will notify you by phone as soon as  possible.  Submit refill requests through [employer] or call your pharmacy and they will forward the refill request to us. Please allow 3 business days for your refill to be completed.          Additional Information About Your Visit        Care EveryWhere ID     This is your Care EveryWhere ID. This could be used by other organizations to access your [facility] medical records  [id number]        Your Vitals Were     Pulse Temperature Respirations Pulse Oximetry BMI (Body Mass Index)       80 97.3  F (36.3  C) (Tympanic) 14 99% 26.23 kg/m2        Blood Pressure from Last 3 Encounters:   11/20/18 120/62   02/15/18 120/60   10/12/17 121/85    Weight from Last 3 Encounters:   11/20/18 165 lb (74.8 kg)   02/15/18 163 lb (73.9 kg)   02/05/18 171 lb 1.6 oz (77.6 kg)              Today, you had the following     No orders found for display         Today's Medication Changes          These changes are accurate as of 11/20/18  4:05 PM.  If you have any questions, ask your nurse or doctor.               These medicines have changed or have updated prescriptions.        Dose/Directions    allopurinol 100 MG tablet   Commonly known as:  ZYLOPRIM   This may have changed:  See the new instructions.   Used for:  Idiopathic chronic gout of multiple sites without tophus   Changed by:  Maria Dolores Krishnamurthy APRN CNP        Dose:  100 mg   Take 1 tablet (100 mg) by mouth daily   Quantity:  90 tablet   Refills:  1       diclofenac 50 MG EC tablet   Commonly known as:  VOLTAREN   This may have changed:  Another medication with the same name was removed. Continue taking this medication, and follow the directions you see here.   Used for:  Acute idiopathic gout of right foot   Changed by:  Maria Dolores Krishnamurthy APRN CNP        TAKE ONE TABLET BY MOUTH THREE TIMES A DAY AS NEEDED FOR MODERATE PAIN   Quantity:  90 tablet   Refills:  5            Where to get your medicines      These medications were sent to [facility] [place] -  [place], [place] - [address] [facility]  [address], [place] [address]     Phone:  [phone]     allopurinol 100 MG tablet    atorvastatin 40 MG tablet    diclofenac 50 MG EC tablet                Primary Care Provider Office Phone # Fax #    MYRA Craig FRANKLIN [phone]       [address] [facility] [address]        Equal Access to Services     BETH GASTELUM : Hadii aad ku hadasho Soomaali, waaxda luqadaha, qaybta kaalmada adeegyada, waxay idiin hayaan adeeg kharash la'aan ah. So [facility] [phone].    ATENCIÓN: Si habla español, tiene a cordova disposición servicios gratuitos de asistencia lingüística. Sharrimauro al [phone].    We comply with applicable federal civil rights laws and [place] laws. We do not discriminate on the basis of race, color, national origin, age, disability, sex, sexual orientation, or gender identity.            Thank you!     Thank you for choosing [facility]  for your care. Our goal is always to provide you with excellent care. Hearing back from our patients is one way we can continue to improve our services. Please take a few minutes to complete the written survey that you may receive in the mail after your visit with us. Thank you!             Your Updated Medication List - Protect others around you: Learn how to safely use, store and throw away your medicines at www.disposemymeds.org.          This list is accurate as of 11/20/18  4:05 PM.  Always use your most recent med list.                   Brand Name Dispense Instructions for use Diagnosis    allopurinol 100 MG tablet    ZYLOPRIM    90 tablet    Take 1 tablet (100 mg) by mouth daily    Idiopathic chronic gout of multiple sites without tophus       aspirin 81 MG EC tablet      Take 1 tablet (81 mg) by mouth daily    Cerebrovascular accident (CVA), unspecified mechanism (H)       atorvastatin 40 MG tablet    LIPITOR    90 tablet    Take 1 tablet (40 mg) by mouth daily    Occipital stroke  (H), Thalamic infarct, acute (H)       diclofenac 50 MG EC tablet    VOLTAREN    90 tablet    TAKE ONE TABLET BY MOUTH THREE TIMES A DAY AS NEEDED FOR MODERATE PAIN    Acute idiopathic gout of right foot       HERBALS           STRESS B COMPLEX/IRON Tabs     100 tablet    Take 1 tablet by mouth daily

## 2018-11-20 NOTE — PROGRESS NOTES
SUBJECTIVE:   Sen Watkins is a 58 year old male who presents to clinic today for the following health issues:      Asthma Follow-Up    Was ACT completed today?    Yes    ACT Total Scores 11/20/2018   ACT TOTAL SCORE -   ASTHMA ER VISITS -   ASTHMA HOSPITALIZATIONS -   ACT TOTAL SCORE (Goal Greater than or Equal to 20) 21   In the past 12 months, how many times did you visit the emergency room for your asthma without being admitted to the hospital? 0   In the past 12 months, how many times were you hospitalized overnight because of your asthma? 0     breathing has been good.     A lot of vomiting again after drinking episode recently.     2 weeks of daily vomitting.     Does not want to take anything.               FORMS TO BE COMPLETED FOR JURY DUTY   Left thalamic ischemic stroke 6/2/2016  Very emotional   Needs to avoid the sugars.        Six Item Cognitive Impairment Test   (6CIT):      What year is it?                               Correct - 0 points    What month is it?                               Correct - 0 points      Give the patient an address to remember with five components:   [place] ( first and last name - 2 components)   [address]  (number and name of street - 2 components)   [place] ( city - 1 component)      About what time is it (within the hour)? Correct - 0 points    Count backwards from 20 to 1:   Correct - 0 points    Say the months of the year in reverse: More than one error - 4 points    Repeat the address phrase:   1 error - 2 points    Total 6CIT Score:      23/28    Interpretation: The 6CIT uses an inverse score and questions are weighted to produce a total out of 28. Scores of 0-7 are considered normal and 8 or more significant.    Advantages The test has high sensitivity without compromising specificity even in mild dementia. It is easy to translate linguistically and culturally.  Disadvantages The main disadvantage is in the scoring and weighting of the test,  which is initially confusing, however computer models have simplified this greatly.    Probability Statistics: At the 7/8 cut off: Overall figures sensitivity 90% specificity 100%, in mild dementia sensitivity = 78% , specificity = 100%    Copyright 2000 [facility], [facility], . Courtesy of Dr. Los Nunez reading 2-3 hours a day.   Read the book 10 times now.   Trying to get his writing back and dexterity.   Working on emotional and cognitive function.  Physically ok.     -------------------------------------    Problem list and histories reviewed & adjusted, as indicated.  Additional history: as documented    Labs reviewed in EPIC    Reviewed and updated as needed this visit by clinical staff  Allergies  Meds       Reviewed and updated as needed this visit by Provider         ROS:   ROS: 10 point ROS neg other than the symptoms noted above in the HPI.      OBJECTIVE:                                                    /62  Pulse 80  Temp 97.3  F (36.3  C) (Tympanic)  Resp 14  Wt 165 lb (74.8 kg)  SpO2 99%  BMI 26.23 kg/m2  Body mass index is 26.23 kg/(m^2).   GENERAL: healthy, alert, well nourished, well hydrated, no distress  HENT: ear canals- normal; TMs- normal; Nose- normal; Mouth- no ulcers, no lesions  NECK: no tenderness, no adenopathy, no asymmetry, no masses, no stiffness; thyroid- normal to palpation  RESP: lungs clear to auscultation - no rales, no rhonchi, no wheezes  CV: regular rates and rhythm, normal S1 S2, no S3 or S4 and no murmur, no click or rub -  ABDOMEN: soft, no tenderness, no  hepatosplenomegaly, no masses, normal bowel sounds  MS: extremities- no gross deformities noted, no edema  SKIN: no suspicious lesions, no rashes  NEURO: strength and tone- normal, sensory exam- grossly normal, mentation- intact, speech- normal, reflexes- symmetric    Diagnostic test results:  Results for orders placed or performed during the hospital encounter of  10/12/17   CT Abdomen Pelvis w Contrast    Narrative    CT ABDOMEN PELVIS WITH CONTRAST 10/12/2017 9:15 PM     HISTORY: RLQ abdominal pain.     COMPARISON: CT abdomen pelvis 12/16/2009.    TECHNIQUE: Axial images from the lung bases to the symphysis are  performed with additional coronal reformatted images. 80 mL of Isovue  370 are given intravenously.  Radiation dose for this scan was reduced  using automated exposure control, adjustment of the mA and/or kV  according to patient size, or iterative reconstruction technique.    FINDINGS: The lung bases are clear.    Abdomen: The liver, spleen, gallbladder, pancreas, adrenal glands and  kidneys are unremarkable. No hydronephrosis. No enlarged lymph nodes.  The bowel is normal in caliber without obstruction or diverticulitis.  Fecal debris is scattered throughout the colon possibly indicating  constipation. Appendix is normal.    Pelvis: The bladder and rectum are unremarkable. Rectum is distended  with fecal debris. No enlarged pelvic lymph nodes or free fluid. Bone  window examination is unremarkable. Only mild degenerative spine  changes are present.      Impression    IMPRESSION: No acute changes in the abdomen or pelvis to account for  patient's symptoms. No bowel obstruction, diverticulitis or  appendicitis. Possible constipation.     RASHID NELSON MD   CBC with platelets differential   Result Value Ref Range    WBC 8.1 4.0 - 11.0 10e9/L    RBC Count 5.00 4.4 - 5.9 10e12/L    Hemoglobin 15.1 13.3 - 17.7 g/dL    Hematocrit 44.2 40.0 - 53.0 %    MCV 88 78 - 100 fl    MCH 30.2 26.5 - 33.0 pg    MCHC 34.2 31.5 - 36.5 g/dL    RDW 14.9 10.0 - 15.0 %    Platelet Count 250 150 - 450 10e9/L    Diff Method Automated Method     % Neutrophils 54.7 %    % Lymphocytes 30.0 %    % Monocytes 9.9 %    % Eosinophils 4.7 %    % Basophils 0.5 %    % Immature Granulocytes 0.2 %    Absolute Neutrophil 4.4 1.6 - 8.3 10e9/L    Absolute Lymphocytes 2.4 0.8 - 5.3 10e9/L    Absolute  Monocytes 0.8 0.0 - 1.3 10e9/L    Absolute Eosinophils 0.4 0.0 - 0.7 10e9/L    Absolute Basophils 0.0 0.0 - 0.2 10e9/L    Abs Immature Granulocytes 0.0 0 - 0.4 10e9/L   Comprehensive metabolic panel   Result Value Ref Range    Sodium 141 133 - 144 mmol/L    Potassium 4.0 3.4 - 5.3 mmol/L    Chloride 109 94 - 109 mmol/L    Carbon Dioxide 26 20 - 32 mmol/L    Anion Gap 6 3 - 14 mmol/L    Glucose 83 70 - 99 mg/dL    Urea Nitrogen 24 7 - 30 mg/dL    Creatinine 0.95 0.66 - 1.25 mg/dL    GFR Estimate 82 >60 mL/min/1.7m2    GFR Estimate If Black >90 >60 mL/min/1.7m2    Calcium 8.7 8.5 - 10.1 mg/dL    Bilirubin Total 0.5 0.2 - 1.3 mg/dL    Albumin 3.6 3.4 - 5.0 g/dL    Protein Total 7.2 6.8 - 8.8 g/dL    Alkaline Phosphatase 136 40 - 150 U/L    ALT 92 (H) 0 - 70 U/L    AST 51 (H) 0 - 45 U/L   Lipase   Result Value Ref Range    Lipase 129 73 - 393 U/L        ASSESSMENT/PLAN:                                                    1. Acute idiopathic gout of right foot  Refilled medications today  Gout prevention dietary changes are reviewed continue statin.  - diclofenac (VOLTAREN) 50 MG EC tablet; TAKE ONE TABLET BY MOUTH THREE TIMES A DAY AS NEEDED FOR MODERATE PAIN  Dispense: 90 tablet; Refill: 5    2. Occipital stroke (H)  Continue aspirin 81 mg daily continue statin.  Note generated to excuse him from jury duty  - atorvastatin (LIPITOR) 40 MG tablet; Take 1 tablet (40 mg) by mouth daily  Dispense: 90 tablet; Refill: 2    3. Thalamic infarct, acute (H)  - atorvastatin (LIPITOR) 40 MG tablet; Take 1 tablet (40 mg) by mouth daily  Dispense: 90 tablet; Refill: 2    4. Idiopathic chronic gout of multiple sites without tophus  - allopurinol (ZYLOPRIM) 100 MG tablet; Take 1 tablet (100 mg) by mouth daily  Dispense: 90 tablet; Refill: 1    5.  Mild intermittent asthma  No recent exacerbation.  Declines inhaler.  Consider pulmonary function testing.  Declined today.      Follow up with Provider - Call or return to the clinic with any  worsening of symptoms or no resolution. Patient/Parent verbalized understanding and is in agreement. Medication side effects reviewed.   Current Outpatient Prescriptions   Medication Sig Dispense Refill     allopurinol (ZYLOPRIM) 100 MG tablet Take 1 tablet (100 mg) by mouth daily 90 tablet 1     aspirin EC 81 MG EC tablet Take 1 tablet (81 mg) by mouth daily       atorvastatin (LIPITOR) 40 MG tablet Take 1 tablet (40 mg) by mouth daily 90 tablet 2     diclofenac (VOLTAREN) 50 MG EC tablet TAKE ONE TABLET BY MOUTH THREE TIMES A DAY AS NEEDED FOR MODERATE PAIN 90 tablet 5     HERBALS        Multiple Vitamins-Iron (STRESS B COMPLEX/IRON) TABS Take 1 tablet by mouth daily 100 tablet 3     [DISCONTINUED] atorvastatin (LIPITOR) 40 MG tablet Take 1 tablet (40 mg) by mouth daily 90 tablet 2     [DISCONTINUED] diclofenac (VOLTAREN) 50 MG EC tablet TAKE ONE TABLET BY MOUTH THREE TIMES A DAY AS NEEDED FOR MODERATE PAIN 90 tablet 0     [DISCONTINUED] diclofenac (VOLTAREN) 50 MG EC tablet TAKE ONE TABLET BY MOUTH THREE TIMES A DAY AS NEEDED FOR MODERATE PAIN 90 tablet 2        See Patient Instructions    MYRA Craig [facility]

## 2018-11-21 ASSESSMENT — ASTHMA QUESTIONNAIRES: ACT_TOTALSCORE: 21

## 2018-12-19 ENCOUNTER — OFFICE VISIT (OUTPATIENT)
Dept: FAMILY MEDICINE | Facility: CLINIC | Age: 58
End: 2018-12-19
Payer: MEDICARE

## 2018-12-19 VITALS
HEART RATE: 66 BPM | DIASTOLIC BLOOD PRESSURE: 70 MMHG | TEMPERATURE: 97.3 F | SYSTOLIC BLOOD PRESSURE: 114 MMHG | BODY MASS INDEX: 25.6 KG/M2 | RESPIRATION RATE: 14 BRPM | WEIGHT: 161 LBS | OXYGEN SATURATION: 99 %

## 2018-12-19 DIAGNOSIS — Z11.4 SCREENING FOR HIV (HUMAN IMMUNODEFICIENCY VIRUS): ICD-10-CM

## 2018-12-19 DIAGNOSIS — G89.29 CHRONIC FOOT PAIN, UNSPECIFIED LATERALITY: ICD-10-CM

## 2018-12-19 DIAGNOSIS — I63.81 THALAMIC INFARCT, ACUTE (H): ICD-10-CM

## 2018-12-19 DIAGNOSIS — I63.9 OCCIPITAL STROKE (H): ICD-10-CM

## 2018-12-19 DIAGNOSIS — M79.652 PAIN OF LEFT THIGH: Primary | ICD-10-CM

## 2018-12-19 DIAGNOSIS — Z11.59 NEED FOR HEPATITIS C SCREENING TEST: ICD-10-CM

## 2018-12-19 DIAGNOSIS — M79.673 CHRONIC FOOT PAIN, UNSPECIFIED LATERALITY: ICD-10-CM

## 2018-12-19 PROCEDURE — 99214 OFFICE O/P EST MOD 30 MIN: CPT | Performed by: NURSE PRACTITIONER

## 2018-12-19 RX ORDER — TROLAMINE SALICYLATE 10 G/100G
CREAM TOPICAL PRN
Qty: 226.8 G | Refills: 3 | Status: SHIPPED | OUTPATIENT
Start: 2018-12-19 | End: 2019-07-26

## 2018-12-19 ASSESSMENT — PAIN SCALES - GENERAL: PAINLEVEL: SEVERE PAIN (7)

## 2018-12-19 NOTE — PATIENT INSTRUCTIONS
Schedule Ultrasound of the left leg  Take Aspirin daily due to previous stroke.   Heat, Topical agents Over the counter.   Tylenol up to 3500 mg daily as needed.     PLEASE CALL TO SCHEDULE RADIOLOGY APPOINTMENT [phone]  Please call to schedule Orthotics appt for new foot orthotics to be made.

## 2018-12-19 NOTE — PROGRESS NOTES
SUBJECTIVE:   Sen Watkins is a 58 year old male who presents to clinic today for the following health issues:      Musculoskeletal problem/pain      Duration: 1 week left upper thigh swelling in the left upper thigh.      1 1/2 months started with  left thigh pain. Leg stiffened up.. Pulled hamstring.  Inner left thigh pain. No pain in the back of the upper thigh.     Started after carrying buckets and twisting.    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   When it was done.. Was feeling beat up.   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   Has been taking ibuprofen.         Description  Location: left upper thigh    Intensity:  moderate    Accompanying signs and symptoms: swelling    History  Previous similar problem: no   Previous evaluation:  none    Precipitating or alleviating factors: effecting sleep and moods due to pain   Trauma or overuse: no   Aggravating factors include: none    Therapies tried and outcome: rest/inactivity and tired new boots     Chronic foot pain previous fracture heel bilaterally.    Needs new orthotics made.       -------------------------------------    Problem list and histories reviewed & adjusted, as indicated.  Additional history: as documented    BP Readings from Last 3 Encounters:   12/19/18 114/70   11/20/18 120/62   02/15/18 120/60    Wt Readings from Last 3 Encounters:   12/19/18 73 kg (161 lb)   11/20/18 74.8 kg (165 lb)   02/15/18 73.9 kg (163 lb)                  Labs reviewed in EPIC    Reviewed and updated as needed this visit by clinical staff       Reviewed and updated as needed this visit by Provider         ROS:   ROS: 10 point ROS neg other than the symptoms noted above in the HPI.\      OBJECTIVE:                                                    /70   Pulse 66   Temp 97.3  F (36.3  C) (Tympanic)   Resp 14   Wt 73 kg (161 lb)   SpO2 99%   BMI 25.60 kg/m    Body mass index is 25.6 kg/m .    GENERAL: healthy, alert, well nourished, well hydrated, no distress  HENT: ear canals- normal; TMs- normal; Nose- normal; Mouth- no ulcers, no lesions  NECK: no tenderness, no adenopathy, no asymmetry, no masses, no stiffness; thyroid- normal to palpation  RESP: lungs clear to auscultation - no rales, no rhonchi, no wheezes  CV: regular rates and rhythm, normal S1 S2, no S3 or S4 and no murmur, no click or rub -  ABDOMEN: soft, no tenderness, no  hepatosplenomegaly, no masses, normal bowel sounds  MS: extremities- no gross deformities noted, no edema  SKIN: no suspicious lesions, no rashes  NEURO: strength and tone- normal, sensory exam- grossly normal, mentation- intact, speech- normal, reflexes- symmetric    Diagnostic test results:  Results for orders placed or performed during the hospital encounter of 10/12/17   CT Abdomen Pelvis w Contrast    Narrative    CT ABDOMEN PELVIS WITH CONTRAST 10/12/2017 9:15 PM     HISTORY: RLQ abdominal pain.     COMPARISON: CT abdomen pelvis 12/16/2009.    TECHNIQUE: Axial images from the lung bases to the symphysis are  performed with additional coronal reformatted images. 80 mL of Isovue  370 are given intravenously.  Radiation dose for this scan was reduced  using automated exposure control, adjustment of the mA and/or kV  according to patient size, or iterative reconstruction technique.    FINDINGS: The lung bases are clear.    Abdomen: The liver, spleen, gallbladder, pancreas, adrenal glands and  kidneys are unremarkable. No hydronephrosis. No enlarged lymph nodes.  The bowel is normal in caliber without obstruction or diverticulitis.  Fecal debris is scattered throughout the colon possibly indicating  constipation. Appendix is normal.    Pelvis: The bladder and rectum are unremarkable. Rectum is distended  with fecal debris. No enlarged pelvic lymph nodes or free fluid. Bone  window examination is unremarkable. Only mild degenerative spine  changes are present.       Impression    IMPRESSION: No acute changes in the abdomen or pelvis to account for  patient's symptoms. No bowel obstruction, diverticulitis or  appendicitis. Possible constipation.     RASHID NELSON MD   CBC with platelets differential   Result Value Ref Range    WBC 8.1 4.0 - 11.0 10e9/L    RBC Count 5.00 4.4 - 5.9 10e12/L    Hemoglobin 15.1 13.3 - 17.7 g/dL    Hematocrit 44.2 40.0 - 53.0 %    MCV 88 78 - 100 fl    MCH 30.2 26.5 - 33.0 pg    MCHC 34.2 31.5 - 36.5 g/dL    RDW 14.9 10.0 - 15.0 %    Platelet Count 250 150 - 450 10e9/L    Diff Method Automated Method     % Neutrophils 54.7 %    % Lymphocytes 30.0 %    % Monocytes 9.9 %    % Eosinophils 4.7 %    % Basophils 0.5 %    % Immature Granulocytes 0.2 %    Absolute Neutrophil 4.4 1.6 - 8.3 10e9/L    Absolute Lymphocytes 2.4 0.8 - 5.3 10e9/L    Absolute Monocytes 0.8 0.0 - 1.3 10e9/L    Absolute Eosinophils 0.4 0.0 - 0.7 10e9/L    Absolute Basophils 0.0 0.0 - 0.2 10e9/L    Abs Immature Granulocytes 0.0 0 - 0.4 10e9/L   Comprehensive metabolic panel   Result Value Ref Range    Sodium 141 133 - 144 mmol/L    Potassium 4.0 3.4 - 5.3 mmol/L    Chloride 109 94 - 109 mmol/L    Carbon Dioxide 26 20 - 32 mmol/L    Anion Gap 6 3 - 14 mmol/L    Glucose 83 70 - 99 mg/dL    Urea Nitrogen 24 7 - 30 mg/dL    Creatinine 0.95 0.66 - 1.25 mg/dL    GFR Estimate 82 >60 mL/min/1.7m2    GFR Estimate If Black >90 >60 mL/min/1.7m2    Calcium 8.7 8.5 - 10.1 mg/dL    Bilirubin Total 0.5 0.2 - 1.3 mg/dL    Albumin 3.6 3.4 - 5.0 g/dL    Protein Total 7.2 6.8 - 8.8 g/dL    Alkaline Phosphatase 136 40 - 150 U/L    ALT 92 (H) 0 - 70 U/L    AST 51 (H) 0 - 45 U/L   Lipase   Result Value Ref Range    Lipase 129 73 - 393 U/L        ASSESSMENT/PLAN:                                                    1. Need for hepatitis C screening test  Future order placed  - Hepatitis C Screen Reflex to HCV RNA Quant and Genotype; Future    2. Screening for HIV (human immunodeficiency virus)  Future order  placed  - HIV Screening; Future    3. Pain of left thigh  Topical Aspercreme.  Ultrasound to rule out clot  - US Lower Extremity Venous Duplex Left; Future  - trolamine salicylate (ASPERCREME) 10 % external cream; Apply topically as needed for moderate pain  Dispense: 226.8 g; Refill: 3    4. Thalamic infarct, acute (H)  Neuropsych evaluation recommended.  Symptoms improving.      5. Occipital stroke (H)  Neuropsych evaluation for cognitive follow-up recommended    6. Chronic foot pain, unspecified laterality  Bilateral plantar fasciitis.  - ORTHOTICS REFERRAL      Follow up with Provider - Call or return to the clinic with any worsening of symptoms or no resolution. Patient/Parent verbalized understanding and is in agreement. Medication side effects reviewed.   Current Outpatient Medications   Medication Sig Dispense Refill     allopurinol (ZYLOPRIM) 100 MG tablet Take 1 tablet (100 mg) by mouth daily 90 tablet 1     aspirin EC 81 MG EC tablet Take 1 tablet (81 mg) by mouth daily       atorvastatin (LIPITOR) 40 MG tablet Take 1 tablet (40 mg) by mouth daily 90 tablet 2     diclofenac (VOLTAREN) 50 MG EC tablet TAKE ONE TABLET BY MOUTH THREE TIMES A DAY AS NEEDED FOR MODERATE PAIN 90 tablet 5     HERBALS        Multiple Vitamins-Iron (STRESS B COMPLEX/IRON) TABS Take 1 tablet by mouth daily 100 tablet 3     trolamine salicylate (ASPERCREME) 10 % external cream Apply topically as needed for moderate pain 226.8 g 3        See Patient Instructions    MYRA Craig [facility]

## 2019-06-24 DIAGNOSIS — M1A.09X0 IDIOPATHIC CHRONIC GOUT OF MULTIPLE SITES WITHOUT TOPHUS: ICD-10-CM

## 2019-06-24 RX ORDER — ALLOPURINOL 100 MG/1
TABLET ORAL
Qty: 30 TABLET | Refills: 0 | Status: SHIPPED | OUTPATIENT
Start: 2019-06-24 | End: 2019-07-25

## 2019-06-24 NOTE — TELEPHONE ENCOUNTER
"Requested Prescriptions   Pending Prescriptions Disp Refills     allopurinol (ZYLOPRIM) 100 MG tablet [Pharmacy Med Name: ALLOPURINOL 100MG TABS] 90 tablet 1     Sig: TAKE ONE TABLET BY MOUTH ONCE DAILY       Gout Agents Protocol Failed - 6/24/2019  8:08 AM        Failed - CBC on file in past 12 months     Recent Labs   Lab Test 10/12/17  2100   WBC 8.1   RBC 5.00   HGB 15.1   HCT 44.2                    Failed - ALT on file in past 12 months     Recent Labs   Lab Test 10/12/17  2100   ALT 92*             Failed - Has Uric Acid on file in past 12 months and value is less than 6     Recent Labs   Lab Test 01/02/17  1059   URIC 4.6     If level is 6mg/dL or greater, ok to refill one time and refer to provider.           Failed - Normal serum creatinine on file in the past 12 months     Recent Labs   Lab Test 10/12/17  2100   CR 0.95             Passed - Recent (12 mo) or future (30 days) visit within the authorizing provider's specialty     Patient had office visit in the last 12 months or has a visit in the next 30 days with authorizing provider or within the authorizing provider's specialty.  See \"Patient Info\" tab in inbasket, or \"Choose Columns\" in Meds & Orders section of the refill encounter.              Passed - Medication is active on med list        Passed - Patient is age 18 or older        Last Written Prescription Date:  11/20/18  Last Fill Quantity: 90,  # refills: 1   Last office visit: 12/19/2018 with prescribing provider:     Future Office Visit:      "

## 2019-07-13 DIAGNOSIS — M10.071 ACUTE IDIOPATHIC GOUT OF RIGHT FOOT: ICD-10-CM

## 2019-07-15 NOTE — TELEPHONE ENCOUNTER
"Requested Prescriptions   Pending Prescriptions Disp Refills     diclofenac (VOLTAREN) 50 MG EC tablet [Pharmacy Med Name: DICLOFENAC SODIUM 50MG TBEC] 90 tablet 5     Sig: TAKE ONE TABLET BY MOUTH THREE TIMES A DAY AS NEEDED FOR MODERATE PAIN       NSAID Medications Failed - 7/13/2019  3:37 PM        Failed - Normal ALT on file in past 12 months     Recent Labs   Lab Test 10/12/17  2100   ALT 92*             Failed - Normal AST on file in past 12 months     Recent Labs   Lab Test 10/12/17  2100   AST 51*             Failed - Normal CBC on file in past 12 months     Recent Labs   Lab Test 10/12/17  2100   WBC 8.1   RBC 5.00   HGB 15.1   HCT 44.2                    Failed - Normal serum creatinine on file in past 12 months     Recent Labs   Lab Test 10/12/17  2100   CR 0.95             Passed - Blood pressure under 140/90 in past 12 months     BP Readings from Last 3 Encounters:   12/19/18 114/70   11/20/18 120/62   02/15/18 120/60                 Passed - Recent (12 mo) or future (30 days) visit within the authorizing provider's specialty     Patient had office visit in the last 12 months or has a visit in the next 30 days with authorizing provider or within the authorizing provider's specialty.  See \"Patient Info\" tab in inbasket, or \"Choose Columns\" in Meds & Orders section of the refill encounter.              Passed - Patient is age 6-64 years        Passed - Medication is active on med list      diclofenac (VOLTAREN) 50 MG EC tablet  Last Written Prescription Date:  11/20/2018  Last Fill Quantity: 90 tablet,  # refills: 5   Last office visit: 12/19/2018 with prescribing provider:  JESSICA Krishnamurthy   Future Office Visit:      Melanie Flores RT (R) (M)      "

## 2019-07-25 DIAGNOSIS — M1A.09X0 IDIOPATHIC CHRONIC GOUT OF MULTIPLE SITES WITHOUT TOPHUS: ICD-10-CM

## 2019-07-25 NOTE — TELEPHONE ENCOUNTER
Routing refill request to provider for review/approval because:  Labs not current:  See below    Shadia CANDELARIO RN

## 2019-07-25 NOTE — TELEPHONE ENCOUNTER
"Requested Prescriptions   Pending Prescriptions Disp Refills     allopurinol (ZYLOPRIM) 100 MG tablet [Pharmacy Med Name: ALLOPURINOL 100MG TABS] 30 tablet 0     Sig: TAKE ONE TABLET BY MOUTH ONCE DAILY (NEED TO BE SEEN IN CLINIC FOR FURTHER REFILLS)       Gout Agents Protocol Failed - 7/25/2019  3:39 PM        Failed - CBC on file in past 12 months     Recent Labs   Lab Test 10/12/17  2100   WBC 8.1   RBC 5.00   HGB 15.1   HCT 44.2                    Failed - ALT on file in past 12 months     Recent Labs   Lab Test 10/12/17  2100   ALT 92*             Failed - Has Uric Acid on file in past 12 months and value is less than 6     Recent Labs   Lab Test 01/02/17  1059   URIC 4.6     If level is 6mg/dL or greater, ok to refill one time and refer to provider.           Failed - Normal serum creatinine on file in the past 12 months     Recent Labs   Lab Test 10/12/17  2100   CR 0.95             Passed - Recent (12 mo) or future (30 days) visit within the authorizing provider's specialty     Patient had office visit in the last 12 months or has a visit in the next 30 days with authorizing provider or within the authorizing provider's specialty.  See \"Patient Info\" tab in inbasket, or \"Choose Columns\" in Meds & Orders section of the refill encounter.              Passed - Medication is active on med list        Passed - Patient is age 18 or older      allopurinol (ZYLOPRIM) 100 MG tablet  Last Written Prescription Date:  06/24/2019  Last Fill Quantity: 30 tablet,  # refills: 0  Last office visit: 12/19/2018 with prescribing provider: JESSICA Krishnamurthy    Future Office Visit:      Melanie Flores RT (R) (M)      "

## 2019-07-26 ENCOUNTER — APPOINTMENT (OUTPATIENT)
Dept: MRI IMAGING | Facility: CLINIC | Age: 59
End: 2019-07-26
Attending: FAMILY MEDICINE
Payer: MEDICARE

## 2019-07-26 ENCOUNTER — HOSPITAL ENCOUNTER (EMERGENCY)
Facility: CLINIC | Age: 59
Discharge: HOME | End: 2019-07-26
Attending: FAMILY MEDICINE | Admitting: FAMILY MEDICINE
Payer: MEDICARE

## 2019-07-26 ENCOUNTER — APPOINTMENT (OUTPATIENT)
Dept: CT IMAGING | Facility: CLINIC | Age: 59
End: 2019-07-26
Attending: FAMILY MEDICINE
Payer: MEDICARE

## 2019-07-26 VITALS
SYSTOLIC BLOOD PRESSURE: 123 MMHG | RESPIRATION RATE: 15 BRPM | BODY MASS INDEX: 25.44 KG/M2 | TEMPERATURE: 98 F | DIASTOLIC BLOOD PRESSURE: 90 MMHG | WEIGHT: 160 LBS | OXYGEN SATURATION: 97 % | HEART RATE: 60 BPM

## 2019-07-26 DIAGNOSIS — G45.9 TIA (TRANSIENT ISCHEMIC ATTACK): ICD-10-CM

## 2019-07-26 LAB
ANION GAP SERPL CALCULATED.3IONS-SCNC: 7 MMOL/L (ref 3–14)
APTT PPP: 31 SEC (ref 22–37)
BASOPHILS # BLD AUTO: 0.1 10E9/L (ref 0–0.2)
BASOPHILS NFR BLD AUTO: 0.7 %
BUN SERPL-MCNC: 21 MG/DL (ref 7–30)
CALCIUM SERPL-MCNC: 8.7 MG/DL (ref 8.5–10.1)
CHLORIDE SERPL-SCNC: 109 MMOL/L (ref 94–109)
CO2 SERPL-SCNC: 26 MMOL/L (ref 20–32)
CREAT SERPL-MCNC: 0.92 MG/DL (ref 0.66–1.25)
DIFFERENTIAL METHOD BLD: NORMAL
EOSINOPHIL # BLD AUTO: 0.3 10E9/L (ref 0–0.7)
EOSINOPHIL NFR BLD AUTO: 3.2 %
ERYTHROCYTE [DISTWIDTH] IN BLOOD BY AUTOMATED COUNT: 13.6 % (ref 10–15)
GFR SERPL CREATININE-BSD FRML MDRD: 90 ML/MIN/{1.73_M2}
GLUCOSE BLDC GLUCOMTR-MCNC: 88 MG/DL (ref 70–99)
GLUCOSE SERPL-MCNC: 94 MG/DL (ref 70–99)
HBA1C MFR BLD: 5.9 % (ref 0–5.6)
HCT VFR BLD AUTO: 46.7 % (ref 40–53)
HGB BLD-MCNC: 15.2 G/DL (ref 13.3–17.7)
IMM GRANULOCYTES # BLD: 0 10E9/L (ref 0–0.4)
IMM GRANULOCYTES NFR BLD: 0.2 %
INR PPP: 0.93 (ref 0.86–1.14)
LYMPHOCYTES # BLD AUTO: 2.1 10E9/L (ref 0.8–5.3)
LYMPHOCYTES NFR BLD AUTO: 23.5 %
MCH RBC QN AUTO: 30.6 PG (ref 26.5–33)
MCHC RBC AUTO-ENTMCNC: 32.5 G/DL (ref 31.5–36.5)
MCV RBC AUTO: 94 FL (ref 78–100)
MONOCYTES # BLD AUTO: 0.7 10E9/L (ref 0–1.3)
MONOCYTES NFR BLD AUTO: 7.3 %
NEUTROPHILS # BLD AUTO: 5.9 10E9/L (ref 1.6–8.3)
NEUTROPHILS NFR BLD AUTO: 65.1 %
NRBC # BLD AUTO: 0 10*3/UL
NRBC BLD AUTO-RTO: 0 /100
PLATELET # BLD AUTO: 267 10E9/L (ref 150–450)
POTASSIUM SERPL-SCNC: 4 MMOL/L (ref 3.4–5.3)
RBC # BLD AUTO: 4.97 10E12/L (ref 4.4–5.9)
SODIUM SERPL-SCNC: 142 MMOL/L (ref 133–144)
TROPONIN I SERPL-MCNC: <0.015 UG/L (ref 0–0.04)
WBC # BLD AUTO: 9.1 10E9/L (ref 4–11)

## 2019-07-26 PROCEDURE — 93010 ELECTROCARDIOGRAM REPORT: CPT | Mod: Z6 | Performed by: FAMILY MEDICINE

## 2019-07-26 PROCEDURE — 99285 EMERGENCY DEPT VISIT HI MDM: CPT | Mod: 25 | Performed by: FAMILY MEDICINE

## 2019-07-26 PROCEDURE — 99291 CRITICAL CARE FIRST HOUR: CPT | Mod: 25 | Performed by: FAMILY MEDICINE

## 2019-07-26 PROCEDURE — 25000132 ZZH RX MED GY IP 250 OP 250 PS 637: Mod: GY | Performed by: FAMILY MEDICINE

## 2019-07-26 PROCEDURE — 80048 BASIC METABOLIC PNL TOTAL CA: CPT | Performed by: FAMILY MEDICINE

## 2019-07-26 PROCEDURE — 85730 THROMBOPLASTIN TIME PARTIAL: CPT | Performed by: FAMILY MEDICINE

## 2019-07-26 PROCEDURE — 00000146 ZZHCL STATISTIC GLUCOSE BY METER IP

## 2019-07-26 PROCEDURE — 96360 HYDRATION IV INFUSION INIT: CPT | Mod: 59 | Performed by: FAMILY MEDICINE

## 2019-07-26 PROCEDURE — 84484 ASSAY OF TROPONIN QUANT: CPT | Performed by: FAMILY MEDICINE

## 2019-07-26 PROCEDURE — 70450 CT HEAD/BRAIN W/O DYE: CPT | Mod: XS

## 2019-07-26 PROCEDURE — 70553 MRI BRAIN STEM W/O & W/DYE: CPT

## 2019-07-26 PROCEDURE — 25000128 H RX IP 250 OP 636: Performed by: FAMILY MEDICINE

## 2019-07-26 PROCEDURE — 25500064 ZZH RX 255 OP 636: Performed by: FAMILY MEDICINE

## 2019-07-26 PROCEDURE — A9585 GADOBUTROL INJECTION: HCPCS | Performed by: FAMILY MEDICINE

## 2019-07-26 PROCEDURE — 70498 CT ANGIOGRAPHY NECK: CPT

## 2019-07-26 PROCEDURE — 85610 PROTHROMBIN TIME: CPT | Performed by: FAMILY MEDICINE

## 2019-07-26 PROCEDURE — 85025 COMPLETE CBC W/AUTO DIFF WBC: CPT | Performed by: FAMILY MEDICINE

## 2019-07-26 PROCEDURE — 25000125 ZZHC RX 250: Performed by: FAMILY MEDICINE

## 2019-07-26 PROCEDURE — 83036 HEMOGLOBIN GLYCOSYLATED A1C: CPT | Performed by: FAMILY MEDICINE

## 2019-07-26 PROCEDURE — 93005 ELECTROCARDIOGRAM TRACING: CPT | Performed by: FAMILY MEDICINE

## 2019-07-26 RX ORDER — ASPIRIN 325 MG
325 TABLET ORAL ONCE
Status: COMPLETED | OUTPATIENT
Start: 2019-07-26 | End: 2019-07-26

## 2019-07-26 RX ORDER — GADOBUTROL 604.72 MG/ML
7 INJECTION INTRAVENOUS ONCE
Status: COMPLETED | OUTPATIENT
Start: 2019-07-26 | End: 2019-07-26

## 2019-07-26 RX ORDER — ALLOPURINOL 100 MG/1
TABLET ORAL
Qty: 30 TABLET | Refills: 0 | Status: SHIPPED | OUTPATIENT
Start: 2019-07-26 | End: 2019-08-02

## 2019-07-26 RX ORDER — CLOPIDOGREL BISULFATE 75 MG/1
75 TABLET ORAL DAILY
Qty: 21 TABLET | Refills: 0 | Status: SHIPPED | OUTPATIENT
Start: 2019-07-26 | End: 2019-10-15

## 2019-07-26 RX ORDER — CLOPIDOGREL BISULFATE 75 MG/1
300 TABLET ORAL ONCE
Status: COMPLETED | OUTPATIENT
Start: 2019-07-26 | End: 2019-07-26

## 2019-07-26 RX ORDER — IOPAMIDOL 755 MG/ML
70 INJECTION, SOLUTION INTRAVASCULAR ONCE
Status: COMPLETED | OUTPATIENT
Start: 2019-07-26 | End: 2019-07-26

## 2019-07-26 RX ADMIN — SODIUM CHLORIDE 80 ML: 9 INJECTION, SOLUTION INTRAVENOUS at 08:50

## 2019-07-26 RX ADMIN — IOPAMIDOL 70 ML: 755 INJECTION, SOLUTION INTRAVENOUS at 08:50

## 2019-07-26 RX ADMIN — CLOPIDOGREL BISULFATE 300 MG: 75 TABLET, FILM COATED ORAL at 10:47

## 2019-07-26 RX ADMIN — ASPIRIN 325 MG ORAL TABLET 325 MG: 325 PILL ORAL at 10:47

## 2019-07-26 RX ADMIN — GADOBUTROL 7 ML: 604.72 INJECTION INTRAVENOUS at 09:37

## 2019-07-26 RX ADMIN — SODIUM CHLORIDE 500 ML: 9 INJECTION, SOLUTION INTRAVENOUS at 09:06

## 2019-07-26 NOTE — ED AVS SNAPSHOT
[facility]  [address] [address]  Phone:  [phone]  Fax:  [phone]                                    Sen Watkins   MRN: [id number]    Department:  [facility]   Date of Visit:  7/26/2019           After Visit Summary Signature Page    I have received my discharge instructions, and my questions have been answered. I have discussed any challenges I see with this plan with the nurse or doctor.    ..........................................................................................................................................  Patient/Patient Representative Signature      ..........................................................................................................................................  Patient Representative Print Name and Relationship to Patient    ..................................................               ................................................  Date                                   Time    ..........................................................................................................................................  Reviewed by Signature/Title    ...................................................              ..............................................  Date                                               Time          22EPIC Rev 08/18

## 2019-07-26 NOTE — ED PROVIDER NOTES
History     Chief Complaint   Patient presents with     Insect Bite     bee sting several days ago to forehead, feels reacting     HPI  Sen Jose Watkins is a 58 year old male, past medical history significant for occipital stroke, thalamic stroke, anxiety, prostate cancer, gout, asthma, nephrolithiasis, presents to the emergency department with entrance complaint of insect sting.  History is obtained from the patient who states that he was stung in the left temporal area 2 days prior to presentation he thinks by a wasp or bee.  There was no stinger removed.  He had local swelling and some numbness in that area however over the course of the next 2 days he is noted progression of numbness that originated in the left temple down over the face into the right side.  When he awoke this morning around 3:00 he felt okay (he normally gets around 3 or 4), when he was going back to bed he noticed that he was shuffling his feet more (this was a feature of his CVA previously), numbness in the right arm as well which was new.  He did not want to come in with this but his wife made him.  At the present time he has numbness involving his right face, right upper extremity.  He drove himself to the emergency department.  Patient does identify some headache that he associates with the insect sting.  No nausea or vomiting.  No recent illness.  The patient took Benadryl around bedtime the last couple of nights to help with the discomfort and swelling.  He took some type of 24-hour over-the-counter nonsedating antihistamine that he cannot recall the name of.  His last dose of Benadryl was approximately 8:30 PM yesterday evening.  Of note the patient has been off aspirin completely for the last couple of years.    Allergies:  Allergies   Allergen Reactions     Bees      Codeine Nausea and Vomiting     Tylenol [Acetaminophen] GI Disturbance     vomitting        Problem List:    Patient Active Problem List    Diagnosis Date Noted  "    Osteoarthritis of right foot, unspecified osteoarthritis type 02/02/2017     Priority: Medium     Occipital stroke (H) 07/07/2016     Priority: Medium     Thalamic infarct, acute (H) 07/07/2016     Priority: Medium     Situational anxiety 07/07/2016     Priority: Medium     Screening for prostate cancer 06/13/2016     Priority: Medium     See 5/2013 note for details.  Agrees with USPSTF recommendations against screening.         Acute ischemic stroke (H) 06/02/2016     Priority: Medium     Left thalamic ischemic stroke 6/2/2016.  Workup to identify cause was negative.  Now on 81mg asa daily and statin.    Some numbness in R arm, some coordination issues, mild.         Acute idiopathic gout of right foot 06/02/2016     Priority: Medium     Patient has been treating with colloidal silver       Advanced directives, counseling/discussion 01/20/2016     Priority: Medium     Advance Care Planning 1/20/2016: ACP Review of Chart / Resources Provided:  Reviewed chart for advance care plan.  Sen Watkins has no plan or code status on file. Discussed available resources and provided with information. Confirmed code status reflects current choices pending further ACP discussions. Patient declined. Confirmed/documented legally designated decision maker(s). Added by Evelyn Barrera             Intermittent asthma 04/25/2011     Priority: Medium     dust, weather changes.  doesn't need/want maintenance inhaler.  long stretches with no use of any inhaler.  albuterol prn.   Came to me with the diagnosis.  Likes \"having albuterol around\", unsure on details on the actual diagnosis.         Nephrolithiasis 04/25/2011     Priority: Medium     has had 2 episode of stones.  \"calcium stones\"       CARDIOVASCULAR SCREENING; LDL GOAL LESS THAN 130 10/31/2010     Priority: Medium        Past Medical History:    Past Medical History:   Diagnosis Date     Acute idiopathic gout of right foot 6/2/2016     Acute ischemic stroke (H) 6/2/2016 "     Intermittent asthma 2011     Nephrolithiasis 2011     Occipital stroke (H) 2016     Thalamic infarct, acute (H) 2016       Past Surgical History:    No past surgical history on file.    Family History:    Family History   Problem Relation Age of Onset     Coronary Artery Disease Mother      Cerebrovascular Disease Brother      Psychotic Disorder Sister      Psychotic Disorder Brother        Social History:  Marital Status:   [2]  Social History     Tobacco Use     Smoking status: Former Smoker     Types: Cigarettes     Last attempt to quit: 2012     Years since quittin.2     Smokeless tobacco: Former User   Substance Use Topics     Alcohol use: No     Alcohol/week: 0.0 oz     Drug use: No        Medications:      allopurinol (ZYLOPRIM) 100 MG tablet   atorvastatin (LIPITOR) 40 MG tablet   clopidogrel (PLAVIX) 75 MG tablet   HERBALS   HERBALS   aspirin EC 81 MG EC tablet   diclofenac (VOLTAREN) 50 MG EC tablet         Review of Systems   All other systems reviewed and are negative.      Physical Exam   BP: (!) 126/91  Pulse: 65  Heart Rate: 61  Temp: 98  F (36.7  C)  Resp: 16  Weight: 72.6 kg (160 lb)  SpO2: 96 %      Physical Exam   Constitutional: He is oriented to person, place, and time. He appears well-developed and well-nourished.   Alert, oriented x3, no facial asymmetry, no dysarthria.   HENT:   Head: Normocephalic and atraumatic.   Right Ear: External ear normal.   Left Ear: External ear normal.   Nose: Nose normal.   Mouth/Throat: Oropharynx is clear and moist.   Eyes: Pupils are equal, round, and reactive to light. Conjunctivae and EOM are normal.   Neck: Normal range of motion. Neck supple.   Cardiovascular: Normal rate, regular rhythm and normal heart sounds.   Pulmonary/Chest: Effort normal and breath sounds normal.   Abdominal: Soft. Bowel sounds are normal.   Musculoskeletal: Normal range of motion.   Neurological: He is alert and oriented to person, place, and  time. He has normal strength. No cranial nerve deficit or sensory deficit. He displays a negative Romberg sign. GCS eye subscore is 4. GCS verbal subscore is 5. GCS motor subscore is 6. He displays no Babinski's sign on the right side. He displays no Babinski's sign on the left side.   Reflex Scores:       Tricep reflexes are 2+ on the right side and 2+ on the left side.       Bicep reflexes are 2+ on the right side and 2+ on the left side.       Brachioradialis reflexes are 2+ on the right side and 2+ on the left side.       Patellar reflexes are 2+ on the right side and 2+ on the left side.       Achilles reflexes are 2+ on the right side and 2+ on the left side.  Nursing note and vitals reviewed.      ED Course        Procedures               EKG Interpretation:      Interpreted by Broderick Burns  Time reviewed: 9:17 AM.  Sinus rhythm 51 bpm.  No acute ST-T wave changes.  Normal axis.  Normal intervals.  Impression sinus bradycardia    Results for orders placed or performed during the hospital encounter of 07/26/19   CT Head w/o Contrast    Narrative    CT SCAN OF THE HEAD WITHOUT CONTRAST   7/26/2019 8:55 AM     HISTORY: Right upper extremity paresthesias, facial paresthesias.    TECHNIQUE:  Axial images of the head and coronal reformations without  IV contrast material. Radiation dose for this scan was reduced using  automated exposure control, adjustment of the mA and/or kV according  to patient size, or iterative reconstruction technique.    COMPARISON: MRI 6/2/2016, CT 6/2/2016    FINDINGS: Mild volume loss is present. Old right occipital lobe  infarcts are present. Multiple old bilateral cerebellar infarcts are  present. No evidence of acute ischemia, hemorrhage, mass, mass effect,  or hydrocephalus. The visualized calvarium, and tympan cavities,  mastoid cavities, and paranasal sinuses are unremarkable.      Impression    IMPRESSION:   1. No evidence of acute ischemia or hemorrhage.  2. Multiple old  right occipital and cerebellar infarcts.    Findings were discussed by phone between Dr. Landry and Dr. Burns  at 9:10 AM on 7/26/2019.    KG LANDRY MD   CTA Head Neck with Contrast    Narrative    CT ANGIOGRAM OF THE HEAD AND NECK WITH CONTRAST  7/26/2019 9:06 AM     HISTORY: Facial paresthesias, right upper extremity paresthesias.     TECHNIQUE:  CT angiography with an injection of 70 mL Isovue-370 IV  with scans through the head and neck. Images were transferred to a  separate 3-D workstation where multiplanar reformations and 3-D images  were created. Estimates of carotid stenoses are made relative to the  distal internal carotid artery diameters except as noted. Radiation  dose for this scan was reduced using automated exposure control,  adjustment of the mA and/or kV according to patient size, or iterative  reconstruction technique.    COMPARISON: CTA head and neck 6/2/2016     CT ANGIOGRAM HEAD FINDINGS:     The intracranial vertebral arteries, basilar artery, and posterior  cerebral arteries are patent. The internal carotid arteries, anterior  cerebral arteries, and middle cerebral arteries are patent. No  evidence of large vessel occlusion. No evidence of aneurysm or  vascular malformation. Dural venous sinuses are without appreciable  abnormality.     CT ANGIOGRAM NECK FINDINGS:   A four-vessel aortic arch is present with origin of the left vertebral  artery directly from the aortic arch. There is mild focal narrowing of  the left vertebral artery at the mid V2 segment with focal  intraluminal intimal thickening/protrusion (series 6 image 22). This  is unchanged. The bilateral common carotid, internal carotid, external  carotid, and vertebral arteries are otherwise patent and unremarkable.  No evidence of flow-limiting stenosis at the carotid bifurcations.     Periapical dental abscesses are present involving tooth #8 and 12 and  the remaining right maxillary molar tooth. Multiple cavities  are  present. Mild cervical spine degenerative change is present most  conspicuous at C5-6 and C6-7.      Impression    IMPRESSION:   CTA Head:   1. No acute abnormality.  2. High-grade narrowing versus occlusion of the left posterior  cerebral artery at the P2-3 junction, unchanged. No evidence of large  vessel occlusion.     CTA Neck:   1. No acute abnormality.  2. Mild short segment stenosis involving the left vertebral artery V2  segment with focal intimal thickening/protrusion which is likely  related to old injury, unchanged compared to 2016.  3. Otherwise unremarkable CTA of the neck. No evidence of  flow-limiting stenosis at the carotid bifurcations.  4. Severe dental disease with periapical dental abscesses.     KG MESA MD   MR Brain w/o & w Contrast    Narrative    MRI BRAIN WITHOUT AND WITH CONTRAST  7/26/2019 10:01 AM    HISTORY:  Facial paresthesias, right arm paresthesias, acutely  negative CT/CTA however significant previous stroke burden.     TECHNIQUE:  Multiplanar, multisequence MRI of the brain without and  with 7 mL Gadavist.    COMPARISON: Head CT 7/26/2019, head MRI 6/2/2016    FINDINGS:  A subcentimeter area of diffusion restriction is present  within the left ventral lateral thalamus (series 5 image 65). This is  in a similar location compared to infarct from 6/2/2016. Mild volume  loss is present. Multiple old right occipital and bilateral cerebellar  infarcts are present, unchanged.    The cerebral hemispheres, brainstem, and cerebellum otherwise  demonstrate normal morphology and signal. No abnormal enhancement or  susceptibility related signal loss is identified. The calvarium,  tympanic cavities, mastoid cavities, and paranasal sinuses are  unremarkable.      Impression    IMPRESSION:  1. Acute subcentimeter infarct within the left ventrolateral thalamus.  This is in a similar location compared to infarct on 6/2/2016 MRI  suggestive of acute on chronic infarct.  2. Volume loss,  chronic small vessel ischemic change, and multiple old  right occipital and bilateral cerebellar infarcts.    KG MESA MD     Labs Ordered and Resulted from Time of ED Arrival Up to the Time of Departure from the ED   CBC WITH PLATELETS DIFFERENTIAL   BASIC METABOLIC PANEL   INR   PARTIAL THROMBOPLASTIN TIME   TROPONIN I   GLUCOSE BY METER   VITAL SIGNS AND NEURO CHECKS   ACTIVITY   PULSE OXIMETRY NURSING   GLUCOSE MONITOR NURSING POCT   ASSESSMENT   NOTIFY           Critical Care time:  none               No results found for this or any previous visit (from the past 24 hour(s)).    Medications   0.9% sodium chloride BOLUS (0 mLs Intravenous Stopped 7/26/19 1030)   iopamidol (ISOVUE-370) solution 70 mL (70 mLs Intravenous Given 7/26/19 0850)   sodium chloride 0.9 % bag 500mL for CT scan flush use (80 mLs Intravenous Given 7/26/19 0850)   sodium chloride (PF) 0.9% PF flush 10 mL (10 mLs Intravenous Given 7/26/19 0938)   gadobutrol (GADAVIST) injection 7 mL (7 mLs Intravenous Given 7/26/19 0937)   clopidogrel (PLAVIX) tablet 300 mg (300 mg Oral Given 7/26/19 1047)   aspirin (ASA) tablet 325 mg (325 mg Oral Given 7/26/19 1047)     Patient was discussed with Dr. David Murray for the stroke neurology team at [facility].  We reviewed the patient's significant stroke history as well as his presentation today, we reviewed all imaging.  Dr. Murray felt that the patient should be on daily aspirin indefinitely and on Plavix at 75 mg once daily after the load of 300 mg today for the next 3 weeks.  He felt the disposition home was appropriate at this point.  I spoke with the patient about recommendations from neurology.  He agrees that he will be compliant with medical therapy.  The importance of a statin was also enforced.  Assessments & Plan (with Medical Decision Making)   58-year-old male past medical history reviewed as above who presents to the emergency department with concerns of an insect  sting and some associated numbness involving the left side of his face as well as now the right side of his face and right arm and abnormality of gait (shuffling).  The latter symptoms of feature of previous CVA and so he was concerned about it and presents for evaluation.  No gross neurologic abnormality on exam.  Nonlateralizing exam.  There is some mild swelling in the left temporal area from the bite/sting.  Imaging studies reviewed as above with no acute findings on CT or CTA however on MRI there is an acute subcentimeter infarct within the left ventrolateral thalamus.  Reviewed as above with stroke neurology and recommendations as above.  Patient is dispositioned to home and follow-up in clinic with his primary care provider recommended in the next 2 to 3 weeks.  Return to the emergency department with new symptoms.      Disclaimer: This note consists of symbols derived from keyboarding, dictation and/or voice recognition software. As a result, there may be errors in the script that have gone undetected. Please consider this when interpreting information found in this chart.      I have reviewed the nursing notes.    I have reviewed the findings, diagnosis, plan and need for follow up with the patient.          Medication List      Started    clopidogrel 75 MG tablet  Commonly known as:  PLAVIX  75 mg, Oral, DAILY            Final diagnoses:   TIA (transient ischemic attack) - Thalamic       7/26/2019   [facility] EMERGENCY DEPARTMENT     Broderick Burns MD  07/29/19 3520

## 2019-07-26 NOTE — DISCHARGE INSTRUCTIONS
Aspirin 325 mg once daily.  Plavix 75 mg once daily x3 weeks and then discontinue and continue the aspirin indefinitely.  Atorvastatin 20 mg once daily.  If you have progression of symptoms or change in symptoms please return to the emergency department.  Please make an appointment for next week in clinic with your primary care provider for follow-up.

## 2019-07-26 NOTE — ED TRIAGE NOTES
Had day of vomiting after bee sting, today has numbness of mouth and right side of face, feels clumsy, dizzy, watering eyes, low energy, hx of CVA, took allergy pill today, has used benadryl but makes very tired

## 2019-07-28 DIAGNOSIS — M10.071 ACUTE IDIOPATHIC GOUT OF RIGHT FOOT: ICD-10-CM

## 2019-07-29 NOTE — TELEPHONE ENCOUNTER
"Requested Prescriptions   Pending Prescriptions Disp Refills     diclofenac (VOLTAREN) 50 MG EC tablet [Pharmacy Med Name: DICLOFENAC SODIUM 50MG TBEC] 30 tablet 0     Sig: TAKE ONE TABLET BY MOUTH THREE TIMES A DAY AS NEEDED FOR MODERATE PAIN       NSAID Medications Failed - 7/28/2019  1:38 PM        Failed - Blood pressure under 140/90 in past 12 months     BP Readings from Last 3 Encounters:   07/26/19 123/90   12/19/18 114/70   11/20/18 120/62                 Failed - Normal ALT on file in past 12 months     Recent Labs   Lab Test 10/12/17  2100   ALT 92*             Failed - Normal AST on file in past 12 months     Recent Labs   Lab Test 10/12/17  2100   AST 51*             Failed - Medication is active on med list        Passed - Recent (12 mo) or future (30 days) visit within the authorizing provider's specialty     Patient had office visit in the last 12 months or has a visit in the next 30 days with authorizing provider or within the authorizing provider's specialty.  See \"Patient Info\" tab in inbasket, or \"Choose Columns\" in Meds & Orders section of the refill encounter.              Passed - Patient is age 6-64 years        Passed - Normal CBC on file in past 12 months     Recent Labs   Lab Test 07/26/19  0846   WBC 9.1   RBC 4.97   HGB 15.2   HCT 46.7                    Passed - Normal serum creatinine on file in past 12 months     Recent Labs   Lab Test 07/26/19  0846   CR 0.92           diclofenac (VOLTAREN) 50 MG EC tablet (Discontinued)  Last Written Prescription Date:  07/15/2019  Last Fill Quantity: 30 tablet,  # refills: 0   Last office visit: 12/19/2018 with prescribing provider:  JESSICA Krishnamurthy   Future Office Visit:   Next 5 appointments (look out 90 days)    Aug 02, 2019  1:20 PM CDT  SHORT with MYRA Craig CNP  [facility] ([facility]) [address] [place] [id number] [phone]         Melanie ELLIOTT (R) (M)      "

## 2019-07-29 NOTE — TELEPHONE ENCOUNTER
Pt is requesting the refill of the medication as he still has right side pain.  He knows he has an appt coming up with GREY Krishnamurthy but would like this filled prior to that appt.    Enriqueta Mahajanat

## 2019-07-29 NOTE — TELEPHONE ENCOUNTER
Routing refill request to provider for review/approval because:  Drug not active on patient's medication list    Rosa M Joe RN

## 2019-08-02 ENCOUNTER — OFFICE VISIT (OUTPATIENT)
Dept: FAMILY MEDICINE | Facility: CLINIC | Age: 59
End: 2019-08-02
Payer: MEDICARE

## 2019-08-02 VITALS
SYSTOLIC BLOOD PRESSURE: 102 MMHG | OXYGEN SATURATION: 97 % | RESPIRATION RATE: 20 BRPM | DIASTOLIC BLOOD PRESSURE: 80 MMHG | HEART RATE: 81 BPM | HEIGHT: 66 IN | BODY MASS INDEX: 23.91 KG/M2 | WEIGHT: 148.8 LBS | TEMPERATURE: 97.8 F

## 2019-08-02 DIAGNOSIS — M1A.09X0 IDIOPATHIC CHRONIC GOUT OF MULTIPLE SITES WITHOUT TOPHUS: ICD-10-CM

## 2019-08-02 DIAGNOSIS — I63.81 THALAMIC INFARCT, ACUTE (H): Primary | ICD-10-CM

## 2019-08-02 DIAGNOSIS — M10.071 ACUTE IDIOPATHIC GOUT OF RIGHT FOOT: ICD-10-CM

## 2019-08-02 DIAGNOSIS — I63.9 OCCIPITAL STROKE (H): ICD-10-CM

## 2019-08-02 DIAGNOSIS — E78.5 HYPERLIPIDEMIA WITH TARGET LDL LESS THAN 100: ICD-10-CM

## 2019-08-02 PROCEDURE — 99214 OFFICE O/P EST MOD 30 MIN: CPT | Performed by: NURSE PRACTITIONER

## 2019-08-02 RX ORDER — ASPIRIN 325 MG
325 TABLET ORAL DAILY
COMMUNITY

## 2019-08-02 RX ORDER — ALLOPURINOL 100 MG/1
TABLET ORAL
Qty: 90 TABLET | Refills: 1 | Status: SHIPPED | OUTPATIENT
Start: 2019-08-02 | End: 2020-01-16

## 2019-08-02 RX ORDER — ATORVASTATIN CALCIUM 40 MG/1
40 TABLET, FILM COATED ORAL DAILY
Qty: 90 TABLET | Refills: 2 | Status: SHIPPED | OUTPATIENT
Start: 2019-08-02 | End: 2020-01-16

## 2019-08-02 ASSESSMENT — MIFFLIN-ST. JEOR: SCORE: 1428.73

## 2019-08-02 ASSESSMENT — ASTHMA QUESTIONNAIRES
QUESTION_2 LAST FOUR WEEKS HOW OFTEN HAVE YOU HAD SHORTNESS OF BREATH: NOT AT ALL
ACT_TOTALSCORE: 24
QUESTION_1 LAST FOUR WEEKS HOW MUCH OF THE TIME DID YOUR ASTHMA KEEP YOU FROM GETTING AS MUCH DONE AT WORK, SCHOOL OR AT HOME: NONE OF THE TIME
ACUTE_EXACERBATION_TODAY: NO
QUESTION_4 LAST FOUR WEEKS HOW OFTEN HAVE YOU USED YOUR RESCUE INHALER OR NEBULIZER MEDICATION (SUCH AS ALBUTEROL): NOT AT ALL
QUESTION_3 LAST FOUR WEEKS HOW OFTEN DID YOUR ASTHMA SYMPTOMS (WHEEZING, COUGHING, SHORTNESS OF BREATH, CHEST TIGHTNESS OR PAIN) WAKE YOU UP AT NIGHT OR EARLIER THAN USUAL IN THE MORNING: NOT AT ALL
QUESTION_5 LAST FOUR WEEKS HOW WOULD YOU RATE YOUR ASTHMA CONTROL: WELL CONTROLLED

## 2019-08-02 NOTE — PROGRESS NOTES
"Subjective     Sen Watkins is a 58 year old male who presents to clinic today for the following health issues:    HPI   ED/UC Followup:    Facility:  [facility]   Date of visit: 19  Reason for visit: Insect Bite,Stroke  Current Status: Lump is improved, Still has some numbness and weakness        -------------------------------------    Patient Active Problem List   Diagnosis     CARDIOVASCULAR SCREENING; LDL GOAL LESS THAN 130     Intermittent asthma     Nephrolithiasis     Advanced directives, counseling/discussion     Acute ischemic stroke (H)     Acute idiopathic gout of right foot     Screening for prostate cancer     Occipital stroke (H)     Thalamic infarct, acute (H)     Situational anxiety     Osteoarthritis of right foot, unspecified osteoarthritis type     History reviewed. No pertinent surgical history.    Social History     Tobacco Use     Smoking status: Former Smoker     Types: Cigarettes     Last attempt to quit: 2012     Years since quittin.2     Smokeless tobacco: Former User   Substance Use Topics     Alcohol use: No     Alcohol/week: 0.0 oz     Family History   Problem Relation Age of Onset     Coronary Artery Disease Mother      Cerebrovascular Disease Brother      Psychotic Disorder Sister      Psychotic Disorder Brother            -------------------------------------  Reviewed and updated as needed this visit by Provider         Review of Systems    ROS: 10 point ROS neg other than the symptoms noted above in the HPI.        Objective    /80 (BP Location: Right arm, Patient Position: Sitting, Cuff Size: Adult Regular)   Pulse 81   Temp 97.8  F (36.6  C) (Tympanic)   Resp 20   Ht 1.67 m (5' 5.75\")   Wt 67.5 kg (148 lb 12.8 oz)   SpO2 97%   BMI 24.20 kg/m    Body mass index is 24.2 kg/m .  Physical Exam   GENERAL: healthy, alert and no distress  EYES: Eyes grossly normal to inspection, PERRL and conjunctivae and sclerae normal  HENT: ear canals " and TM's normal, nose and mouth without ulcers or lesions  NECK: no adenopathy, no asymmetry, masses, or scars and thyroid normal to palpation  RESP: lungs clear to auscultation - no rales, rhonchi or wheezes  CV: regular rate and rhythm, normal S1 S2, no S3 or S4, no murmur, click or rub, no peripheral edema and peripheral pulses strong  ABDOMEN: soft, nontender, no hepatosplenomegaly, no masses and bowel sounds normal  MS: no gross musculoskeletal defects noted, no edema  SKIN: no suspicious lesions or rashes  NEURO: Normal strength and tone, mentation intact and speech normal  PSYCH: mentation appears normal, affect normal/bright    Diagnostic Test Results:  Results for orders placed or performed during the hospital encounter of 07/26/19   CT Head w/o Contrast    Narrative    CT SCAN OF THE HEAD WITHOUT CONTRAST   7/26/2019 8:55 AM     HISTORY: Right upper extremity paresthesias, facial paresthesias.    TECHNIQUE:  Axial images of the head and coronal reformations without  IV contrast material. Radiation dose for this scan was reduced using  automated exposure control, adjustment of the mA and/or kV according  to patient size, or iterative reconstruction technique.    COMPARISON: MRI 6/2/2016, CT 6/2/2016    FINDINGS: Mild volume loss is present. Old right occipital lobe  infarcts are present. Multiple old bilateral cerebellar infarcts are  present. No evidence of acute ischemia, hemorrhage, mass, mass effect,  or hydrocephalus. The visualized calvarium, and tympan cavities,  mastoid cavities, and paranasal sinuses are unremarkable.      Impression    IMPRESSION:   1. No evidence of acute ischemia or hemorrhage.  2. Multiple old right occipital and cerebellar infarcts.    Findings were discussed by phone between Dr. Landry and Dr. Burns  at 9:10 AM on 7/26/2019.    KG LANDRY MD   CTA Head Neck with Contrast    Narrative    CT ANGIOGRAM OF THE HEAD AND NECK WITH CONTRAST  7/26/2019 9:06 AM     HISTORY:  Facial paresthesias, right upper extremity paresthesias.     TECHNIQUE:  CT angiography with an injection of 70 mL [id number]-370 IV  with scans through the head and neck. Images were transferred to a  separate 3-D workstation where multiplanar reformations and 3-D images  were created. Estimates of carotid stenoses are made relative to the  distal internal carotid artery diameters except as noted. Radiation  dose for this scan was reduced using automated exposure control,  adjustment of the mA and/or kV according to patient size, or iterative  reconstruction technique.    COMPARISON: CTA head and neck 6/2/2016     CT ANGIOGRAM HEAD FINDINGS:     The intracranial vertebral arteries, basilar artery, and posterior  cerebral arteries are patent. The internal carotid arteries, anterior  cerebral arteries, and middle cerebral arteries are patent. No  evidence of large vessel occlusion. No evidence of aneurysm or  vascular malformation. Dural venous sinuses are without appreciable  abnormality.     CT ANGIOGRAM NECK FINDINGS:   A four-vessel aortic arch is present with origin of the left vertebral  artery directly from the aortic arch. There is mild focal narrowing of  the left vertebral artery at the mid V2 segment with focal  intraluminal intimal thickening/protrusion (series 6 image 22). This  is unchanged. The bilateral common carotid, internal carotid, external  carotid, and vertebral arteries are otherwise patent and unremarkable.  No evidence of flow-limiting stenosis at the carotid bifurcations.     Periapical dental abscesses are present involving tooth #8 and 12 and  the remaining right maxillary molar tooth. Multiple cavities are  present. Mild cervical spine degenerative change is present most  conspicuous at C5-6 and C6-7.      Impression    IMPRESSION:   CTA Head:   1. No acute abnormality.  2. High-grade narrowing versus occlusion of the left posterior  cerebral artery at the P2-3 junction, unchanged. No  evidence of large  vessel occlusion.     CTA Neck:   1. No acute abnormality.  2. Mild short segment stenosis involving the left vertebral artery V2  segment with focal intimal thickening/protrusion which is likely  related to old injury, unchanged compared to 2016.  3. Otherwise unremarkable CTA of the neck. No evidence of  flow-limiting stenosis at the carotid bifurcations.  4. Severe dental disease with periapical dental abscesses.     KG MESA MD   MR Brain w/o & w Contrast    Narrative    MRI BRAIN WITHOUT AND WITH CONTRAST  7/26/2019 10:01 AM    HISTORY:  Facial paresthesias, right arm paresthesias, acutely  negative CT/CTA however significant previous stroke burden.     TECHNIQUE:  Multiplanar, multisequence MRI of the brain without and  with 7 mL Gadavist.    COMPARISON: Head CT 7/26/2019, head MRI 6/2/2016    FINDINGS:  A subcentimeter area of diffusion restriction is present  within the left ventral lateral thalamus (series 5 image 65). This is  in a similar location compared to infarct from 6/2/2016. Mild volume  loss is present. Multiple old right occipital and bilateral cerebellar  infarcts are present, unchanged.    The cerebral hemispheres, brainstem, and cerebellum otherwise  demonstrate normal morphology and signal. No abnormal enhancement or  susceptibility related signal loss is identified. The calvarium,  tympanic cavities, mastoid cavities, and paranasal sinuses are  unremarkable.      Impression    IMPRESSION:  1. Acute subcentimeter infarct within the left ventrolateral thalamus.  This is in a similar location compared to infarct on 6/2/2016 MRI  suggestive of acute on chronic infarct.  2. Volume loss, chronic small vessel ischemic change, and multiple old  right occipital and bilateral cerebellar infarcts.    KG MESA MD   CBC with platelets differential   Result Value Ref Range    WBC 9.1 4.0 - 11.0 10e9/L    RBC Count 4.97 4.4 - 5.9 10e12/L    Hemoglobin 15.2 13.3 - 17.7 g/dL     Hematocrit 46.7 40.0 - 53.0 %    MCV 94 78 - 100 fl    MCH 30.6 26.5 - 33.0 pg    MCHC 32.5 31.5 - 36.5 g/dL    RDW 13.6 10.0 - 15.0 %    Platelet Count 267 150 - 450 10e9/L    Diff Method Automated Method     % Neutrophils 65.1 %    % Lymphocytes 23.5 %    % Monocytes 7.3 %    % Eosinophils 3.2 %    % Basophils 0.7 %    % Immature Granulocytes 0.2 %    Nucleated RBCs 0 0 /100    Absolute Neutrophil 5.9 1.6 - 8.3 10e9/L    Absolute Lymphocytes 2.1 0.8 - 5.3 10e9/L    Absolute Monocytes 0.7 0.0 - 1.3 10e9/L    Absolute Eosinophils 0.3 0.0 - 0.7 10e9/L    Absolute Basophils 0.1 0.0 - 0.2 10e9/L    Abs Immature Granulocytes 0.0 0 - 0.4 10e9/L    Absolute Nucleated RBC 0.0    Basic metabolic panel   Result Value Ref Range    Sodium 142 133 - 144 mmol/L    Potassium 4.0 3.4 - 5.3 mmol/L    Chloride 109 94 - 109 mmol/L    Carbon Dioxide 26 20 - 32 mmol/L    Anion Gap 7 3 - 14 mmol/L    Glucose 94 70 - 99 mg/dL    Urea Nitrogen 21 7 - 30 mg/dL    Creatinine 0.92 0.66 - 1.25 mg/dL    GFR Estimate 90 >60 mL/min/[1.73_m2]    GFR Estimate If Black >90 >60 mL/min/[1.73_m2]    Calcium 8.7 8.5 - 10.1 mg/dL   INR   Result Value Ref Range    INR 0.93 0.86 - 1.14   Partial thromboplastin time   Result Value Ref Range    PTT 31 22 - 37 sec   Troponin I   Result Value Ref Range    Troponin I ES <0.015 0.000 - 0.045 ug/L   Glucose by meter   Result Value Ref Range    Glucose 88 70 - 99 mg/dL   Hemaglobin A1C   Result Value Ref Range    Hemoglobin A1C 5.9 (H) 0 - 5.6 %           Assessment & Plan     Sen was seen today for er f/u.    Diagnoses and all orders for this visit:    Thalamic infarct, acute (H)  -     NEUROLOGY ADULT REFERRAL  -     atorvastatin (LIPITOR) 40 MG tablet; Take 1 tablet (40 mg) by mouth daily    Occipital stroke (H)  -     NEUROLOGY ADULT REFERRAL  -     atorvastatin (LIPITOR) 40 MG tablet; Take 1 tablet (40 mg) by mouth daily    Idiopathic chronic gout of multiple sites without tophus  -     **Uric acid  FUTURE 2mo; Future  -     allopurinol (ZYLOPRIM) 100 MG tablet; TAKE ONE TABLET BY MOUTH ONCE DAILY    Hyperlipidemia with target LDL less than 100  -     Lipid panel reflex to direct LDL Fasting; Future    Acute idiopathic gout of right foot  -     diclofenac (VOLTAREN) 50 MG EC tablet; TAKE ONE TABLET BY MOUTH THREE TIMES A DAY AS NEEDED FOR MODERATE PAIN    Continue Plavix 75 mg daily for the next 21 days  Avoid NSAIDs if possible.  Voltaren was refilled today.  He is encouraged to use this sparingly  To trial extra strength Tylenol up to 3500 mg daily instead along with Topical Aspercreme  Continue allopurinol  Return to the clinic for uric acid level.  Future order placed.  Continue statin 40 mg daily  OT evaluation recommended.  Patient declined.  Regular exercise  Call or return to the clinic with any worsening of symptoms or no resolution. Patient/Parent verbalized understanding and is in agreement. Medication side effects reviewed.   Current Outpatient Medications   Medication Sig Dispense Refill     allopurinol (ZYLOPRIM) 100 MG tablet TAKE ONE TABLET BY MOUTH ONCE DAILY 90 tablet 1     aspirin (ASA) 325 MG tablet Take 325 mg by mouth daily       atorvastatin (LIPITOR) 40 MG tablet Take 1 tablet (40 mg) by mouth daily 90 tablet 2     clopidogrel (PLAVIX) 75 MG tablet Take 1 tablet (75 mg) by mouth daily for 21 days 21 tablet 0     diclofenac (VOLTAREN) 50 MG EC tablet TAKE ONE TABLET BY MOUTH THREE TIMES A DAY AS NEEDED FOR MODERATE PAIN 30 tablet 5     HERBALS Take 1 each by mouth daily Fermented Chongga       HERBALS Take 1 each by mouth daily Joselin Lerner       Chart documentation with Dragon Voice recognition Software. Although reviewed after completion, some words and grammatical errors may remain.    See Patient Instructions    Return in about 3 months (around 11/2/2019) for Follow-up CVA.    MYRA Craig [facility]

## 2019-08-02 NOTE — PATIENT INSTRUCTIONS
Daily aspirin indefinitely and Plavix at 75 mg once daily for the next 3 weeks  Take cholesterol medication daily  New Subcentimeter infarct within the left ventrolateral thalamus.  Follow up with Neurology recommended.   Call and make a lab appointment fasting

## 2019-08-02 NOTE — LETTER
[facility]  [address] [place] [id number]  [phone]        December 10, 2019    Sen Watkins     [facility] [id number]              Dear Sen Watkins    This is to remind you that your provider wanted you to return to the clinic for fasting lab test(s),    please fast for 10-12 hours. Morning medications can be taken with water.    You may call our office at [facility] at [phone] to schedule an appointment.    Please disregard this notice if you have already had your labs drawn or made an appointment.          Sincerely,        Maria Dolores Krishnamurthy CNP/ benedicto

## 2019-08-02 NOTE — NURSING NOTE
"Chief Complaint   Patient presents with     ER F/U       Initial /80 (BP Location: Right arm, Patient Position: Sitting, Cuff Size: Adult Regular)   Pulse 81   Temp 97.8  F (36.6  C) (Tympanic)   Resp 20   Ht 1.67 m (5' 5.75\")   Wt 67.5 kg (148 lb 12.8 oz)   SpO2 97%   BMI 24.20 kg/m   Estimated body mass index is 24.2 kg/m  as calculated from the following:    Height as of this encounter: 1.67 m (5' 5.75\").    Weight as of this encounter: 67.5 kg (148 lb 12.8 oz).    Patient presents to the clinic using No DME    Health Maintenance that is potentially due pending provider review:  NONE    n/a    Is there anyone who you would like to be able to receive your results? No  If yes have patient fill out MAYRA    Ysabel Hermosillo CMA    "

## 2019-08-02 NOTE — LETTER
My Asthma Action Plan  Name: Sen Watkins   YOB: 1960  Date: 8/2/2019   My doctor: MYRA Craig CNP   My clinic: [facility]         My Asthma Severity: mild persistent  Avoid your asthma triggers: Patient is unaware of triggers               GREEN ZONE   Good Control    I feel good    No cough or wheeze    Can work, sleep and play without asthma symptoms       Take your asthma control medicine every day.     1. If exercise triggers your asthma, take your rescue medication    15 minutes before exercise or sports, and    During exercise if you have asthma symptoms  2. Spacer to use with inhaler: If you have a spacer, make sure to use it with your inhaler             YELLOW ZONE Getting Worse  I have ANY of these:    I do not feel good    Cough or wheeze    Chest feels tight    Wake up at night   1. Keep taking your Green Zone medications  2. Start taking your rescue medicine:    every 20 minutes for up to 1 hour. Then every 4 hours for 24-48 hours.  3. If you stay in the Yellow Zone for more than 12-24 hours, contact your doctor.  4. If you do not return to the Green Zone in 12-24 hours or you get worse, start taking your oral steroid medicine if prescribed by your provider.           RED ZONE Medical Alert - Get Help  I have ANY of these:    I feel awful    Medicine is not helping    Breathing getting harder    Trouble walking or talking    Nose opens wide to breathe       1. Take your rescue medicine NOW  2. If your provider has prescribed an oral steroid medicine, start taking it NOW  3. Call your doctor NOW  4. If you are still in the Red Zone after 20 minutes and you have not reached your doctor:    Take your rescue medicine again and    Call 911 or go to the emergency room right away    See your regular doctor within 2 weeks of an Emergency Room or Urgent Care visit for follow-up treatment.          Annual Reminders:  Meet with Asthma Educator,  Flu Shot  in the Fall, consider Pneumonia Vaccination for patients with asthma (aged 19 and older).    Pharmacy:    [id number] - [place], [place] - [address] [facility] [place] [address] [facility]                      Asthma Triggers  How To Control Things That Make Your Asthma Worse    Triggers are things that make your asthma worse.  Look at the list below to help you find your triggers and what you can do about them.  You can help prevent asthma flare-ups by staying away from your triggers.      Trigger                                                          What you can do   Cigarette Smoke  Tobacco smoke can make asthma worse. Do not allow smoking in your home, car or around you.  Be sure no one smokes at a child s day care or school.  If you smoke, ask your health care provider for ways to help you quit.  Ask family members to quit too.  Ask your health care provider for a referral to Quit Plan to help you quit smoking, or call 1-370-472-PLAN.     Colds, Flu, Bronchitis  These are common triggers of asthma. Wash your hands often.  Don t touch your eyes, nose or mouth.  Get a flu shot every year.     Dust Mites  These are tiny bugs that live in cloth or carpet. They are too small to see. Wash sheets and blankets in hot water every week.   Encase pillows and mattress in dust mite proof covers.  Avoid having carpet if you can. If you have carpet, vacuum weekly.   Use a dust mask and HEPA vacuum.   Pollen and Outdoor Mold  Some people are allergic to trees, grass, or weed pollen, or molds. Try to keep your windows closed.  Limit time out doors when pollen count is high.   Ask you health care provider about taking medicine during allergy season.     Animal Dander  Some people are allergic to skin flakes, urine or saliva from pets with fur or feathers. Keep pets with fur or feathers out of your home.    If you can t keep the pet outdoors, then keep the pet out of your bedroom.   Keep the bedroom door closed.  Keep pets off cloth furniture and away from stuffed toys.     Mice, Rats, and Cockroaches  Some people are allergic to the waste from these pests.   Cover food and garbage.  Clean up spills and food crumbs.  Store grease in the refrigerator.   Keep food out of the bedroom.   Indoor Mold  This can be a trigger if your home has high moisture. Fix leaking faucets, pipes, or other sources of water.   Clean moldy surfaces.  Dehumidify basement if it is damp and smelly.   Smoke, Strong Odors, and Sprays  These can reduce air quality. Stay away from strong odors and sprays, such as perfume, powder, hair spray, paints, smoke incense, paint, cleaning products, candles and new carpet.   Exercise or Sports  Some people with asthma have this trigger. Be active!  Ask your doctor about taking medicine before sports or exercise to prevent symptoms.    Warm up for 5-10 minutes before and after sports or exercise.     Other Triggers of Asthma  Cold air:  Cover your nose and mouth with a scarf.  Sometimes laughing or crying can be a trigger.  Some medicines and food can trigger asthma.

## 2019-08-03 ASSESSMENT — ASTHMA QUESTIONNAIRES: ACT_TOTALSCORE: 24

## 2019-10-03 DIAGNOSIS — M10.071 ACUTE IDIOPATHIC GOUT OF RIGHT FOOT: ICD-10-CM

## 2019-10-03 NOTE — TELEPHONE ENCOUNTER
"DICLOFENAC SODIUM 50MG TBEC  Last Written Prescription Date:  8/2/2019  Last Fill Quantity: 30,  # refills: 5   Last office visit: 8/2/2019 with prescribing provider:  [place]   Future Office Visit:    Requested Prescriptions   Pending Prescriptions Disp Refills     diclofenac (VOLTAREN) 50 MG EC tablet [Pharmacy Med Name: DICLOFENAC SODIUM 50MG TBEC] 30 tablet 5     Sig: TAKE ONE TABLET BY MOUTH THREE TIMES A DAY AS NEEDED FOR MODERATE PAIN       NSAID Medications Failed - 10/3/2019  3:26 PM        Failed - Normal ALT on file in past 12 months     Recent Labs   Lab Test 10/12/17  2100   ALT 92*             Failed - Normal AST on file in past 12 months     Recent Labs   Lab Test 10/12/17  2100   AST 51*             Passed - Blood pressure under 140/90 in past 12 months     BP Readings from Last 3 Encounters:   08/02/19 102/80   07/26/19 123/90   12/19/18 114/70                 Passed - Recent (12 mo) or future (30 days) visit within the authorizing provider's specialty     Patient has had an office visit with the authorizing provider or a provider within the authorizing providers department within the previous 12 mos or has a future within next 30 days. See \"Patient Info\" tab in inbasket, or \"Choose Columns\" in Meds & Orders section of the refill encounter.              Passed - Patient is age 6-64 years        Passed - Normal CBC on file in past 12 months     Recent Labs   Lab Test 07/26/19  0846   WBC 9.1   RBC 4.97   HGB 15.2   HCT 46.7                    Passed - Medication is active on med list        Passed - Normal serum creatinine on file in past 12 months     Recent Labs   Lab Test 07/26/19  0846   CR 0.92               "

## 2019-10-09 ENCOUNTER — TELEPHONE (OUTPATIENT)
Dept: FAMILY MEDICINE | Facility: CLINIC | Age: 59
End: 2019-10-09

## 2019-10-09 NOTE — TELEPHONE ENCOUNTER
S-(situation): states face pain and numbness in face gone today.  Carter this yesterday.  Trouble walking yesterday.  Feels almost 100 % today.  No slurred speech.  No vision problems.  Did not go to the ED yesterday    B-(background): recent history occipital stroke.      A-(assessment): wanting follow up because wife thought he had another stroke.      R-(recommendations): appointment made for 10/15/19.  Advised go to the ED if happens again or is worse.

## 2019-10-09 NOTE — TELEPHONE ENCOUNTER
Patient's wife is calling stating on Monday Sen felt numbness in his face and his leg hurt. They don't know if he had another mini stroke or what but she said he did drive and he had a hard time stopping. He also had some slurred speech and his walking wasn't very good. She states he is doing okay now but they want him to get in with Maria Dolores Krishnamurthy. The phone number is patient's and wife was going to call him to make sure he answered.     Lea [facility] [place]

## 2019-10-14 DIAGNOSIS — M10.071 ACUTE IDIOPATHIC GOUT OF RIGHT FOOT: ICD-10-CM

## 2019-10-14 NOTE — TELEPHONE ENCOUNTER
"Requested Prescriptions   Pending Prescriptions Disp Refills     diclofenac (VOLTAREN) 50 MG EC tablet [Pharmacy Med Name: DICLOFENAC SODIUM 50MG TBEC] 30 tablet 5     Sig: TAKE ONE TABLET BY MOUTH THREE TIMES A DAY AS NEEDED FOR MODERATE PAIN       NSAID Medications Failed - 10/14/2019  9:24 AM        Failed - Normal ALT on file in past 12 months     Recent Labs   Lab Test 10/12/17  2100   ALT 92*             Failed - Normal AST on file in past 12 months     Recent Labs   Lab Test 10/12/17  2100   AST 51*             Passed - Blood pressure under 140/90 in past 12 months     BP Readings from Last 3 Encounters:   08/02/19 102/80   07/26/19 123/90   12/19/18 114/70                 Passed - Recent (12 mo) or future (30 days) visit within the authorizing provider's specialty     Patient has had an office visit with the authorizing provider or a provider within the authorizing providers department within the previous 12 mos or has a future within next 30 days. See \"Patient Info\" tab in inbasket, or \"Choose Columns\" in Meds & Orders section of the refill encounter.              Passed - Patient is age 6-64 years        Passed - Normal CBC on file in past 12 months     Recent Labs   Lab Test 07/26/19  0846   WBC 9.1   RBC 4.97   HGB 15.2   HCT 46.7                    Passed - Medication is active on med list        Passed - Normal serum creatinine on file in past 12 months     Recent Labs   Lab Test 07/26/19  0846   CR 0.92             Last Written Prescription Date:  10/3/19  Last Fill Quantity: 30,  # refills: 0   Last office visit: 8/2/2019 with prescribing provider:     Future Office Visit:   Next 5 appointments (look out 90 days)    Oct 15, 2019  1:40 PM CDT  SHORT with MYRA Craig CNP  [facility] [address] [place] [id number] [phone]           "

## 2019-10-15 ENCOUNTER — OFFICE VISIT (OUTPATIENT)
Dept: FAMILY MEDICINE | Facility: CLINIC | Age: 59
End: 2019-10-15
Payer: MEDICARE

## 2019-10-15 VITALS
WEIGHT: 159 LBS | TEMPERATURE: 98.3 F | OXYGEN SATURATION: 99 % | BODY MASS INDEX: 25.86 KG/M2 | SYSTOLIC BLOOD PRESSURE: 102 MMHG | DIASTOLIC BLOOD PRESSURE: 70 MMHG | HEART RATE: 67 BPM | RESPIRATION RATE: 14 BRPM

## 2019-10-15 DIAGNOSIS — M10.071 ACUTE IDIOPATHIC GOUT OF RIGHT FOOT: ICD-10-CM

## 2019-10-15 DIAGNOSIS — I63.81 THALAMIC INFARCT, ACUTE (H): Primary | ICD-10-CM

## 2019-10-15 DIAGNOSIS — G45.9 TIA (TRANSIENT ISCHEMIC ATTACK): ICD-10-CM

## 2019-10-15 DIAGNOSIS — M54.6 CHRONIC RIGHT-SIDED THORACIC BACK PAIN: ICD-10-CM

## 2019-10-15 DIAGNOSIS — G89.29 CHRONIC RIGHT-SIDED THORACIC BACK PAIN: ICD-10-CM

## 2019-10-15 PROCEDURE — 99214 OFFICE O/P EST MOD 30 MIN: CPT | Performed by: NURSE PRACTITIONER

## 2019-10-15 RX ORDER — CLOPIDOGREL BISULFATE 75 MG/1
75 TABLET ORAL DAILY
Qty: 30 TABLET | Refills: 2 | Status: SHIPPED | OUTPATIENT
Start: 2019-10-15 | End: 2020-01-16

## 2019-10-15 ASSESSMENT — PAIN SCALES - GENERAL: PAINLEVEL: NO PAIN (0)

## 2019-10-15 NOTE — PROGRESS NOTES
Subjective     Sen Watkins is a 58 year old male who presents to clinic today for the following health issues:    HPI   Weakness   Doing a lot in the yard when symptoms started       Duration: 2 days     Description (location/character/radiation): weakness and numbness on right side of face- no symptoms now      Intensity:  moderate    Accompanying signs and symptoms: none    History (similar episodes/previous evaluation): stroke in the past     Precipitating or alleviating factors: None    Therapies tried and outcome: None    Previous CVA 2016  TIA 2019    MRI BRAIN WITH AND WITHOUT 2019                                                              IMPRESSION:  1. Acute subcentimeter infarct within the left ventrolateral thalamus.  This is in a similar location compared to infarct on 2016 MRI  suggestive of acute on chronic infarct.  2. Volume loss, chronic small vessel ischemic change, and multiple old  right occipital and bilateral cerebellar infarcts.     KG MESA MD       -------------------------------------    Patient Active Problem List   Diagnosis     CARDIOVASCULAR SCREENING; LDL GOAL LESS THAN 130     Intermittent asthma     Nephrolithiasis     Advanced directives, counseling/discussion     Acute ischemic stroke (H)     Acute idiopathic gout of right foot     Screening for prostate cancer     Occipital stroke (H)     Thalamic infarct, acute (H)     Situational anxiety     Osteoarthritis of right foot, unspecified osteoarthritis type     No past surgical history on file.    Social History     Tobacco Use     Smoking status: Former Smoker     Types: Cigarettes     Last attempt to quit: 2012     Years since quittin.4     Smokeless tobacco: Former User   Substance Use Topics     Alcohol use: No     Alcohol/week: 0.0 standard drinks     Family History   Problem Relation Age of Onset     Coronary Artery Disease Mother      Cerebrovascular Disease Brother      Psychotic  Disorder Sister      Psychotic Disorder Brother            -------------------------------------  Reviewed and updated as needed this visit by Provider         Review of Systems   ROS COMP: Constitutional, HEENT, cardiovascular, pulmonary, GI, , musculoskeletal, neuro, skin, endocrine and psych systems are negative, except as otherwise noted.      Objective    /70   Pulse 67   Temp 98.3  F (36.8  C) (Tympanic)   Resp 14   Wt 72.1 kg (159 lb)   SpO2 99%   BMI 25.86 kg/m    Body mass index is 25.86 kg/m .  Physical Exam   GENERAL: healthy, alert and no distress  EYES: Eyes grossly normal to inspection, PERRL and conjunctivae and sclerae normal  HENT: ear canals and TM's normal, nose and mouth without ulcers or lesions  NECK: no adenopathy, no asymmetry, masses, or scars and thyroid normal to palpation  RESP: lungs clear to auscultation - no rales, rhonchi or wheezes  CV: regular rate and rhythm, normal S1 S2, no S3 or S4, no murmur, click or rub, no peripheral edema and peripheral pulses strong  ABDOMEN: soft, nontender, no hepatosplenomegaly, no masses and bowel sounds normal  MS: no gross musculoskeletal defects noted, no edema  SKIN: no suspicious lesions or rashes  NEURO: Normal strength and tone, mentation intact and speech normal  PSYCH: mentation appears normal, affect normal/bright    Diagnostic Test Results:  Labs reviewed in Epic  Results for orders placed or performed during the hospital encounter of 07/26/19   CT Head w/o Contrast    Narrative    CT SCAN OF THE HEAD WITHOUT CONTRAST   7/26/2019 8:55 AM     HISTORY: Right upper extremity paresthesias, facial paresthesias.    TECHNIQUE:  Axial images of the head and coronal reformations without  IV contrast material. Radiation dose for this scan was reduced using  automated exposure control, adjustment of the mA and/or kV according  to patient size, or iterative reconstruction technique.    COMPARISON: MRI 6/2/2016, CT 6/2/2016    FINDINGS:  Mild volume loss is present. Old right occipital lobe  infarcts are present. Multiple old bilateral cerebellar infarcts are  present. No evidence of acute ischemia, hemorrhage, mass, mass effect,  or hydrocephalus. The visualized calvarium, and tympan cavities,  mastoid cavities, and paranasal sinuses are unremarkable.      Impression    IMPRESSION:   1. No evidence of acute ischemia or hemorrhage.  2. Multiple old right occipital and cerebellar infarcts.    Findings were discussed by phone between Dr. Landry and Dr. Burns  at 9:10 AM on 7/26/2019.    KG LANDRY MD   CTA Head Neck with Contrast    Narrative    CT ANGIOGRAM OF THE HEAD AND NECK WITH CONTRAST  7/26/2019 9:06 AM     HISTORY: Facial paresthesias, right upper extremity paresthesias.     TECHNIQUE:  CT angiography with an injection of 70 mL [id number] IV  with scans through the head and neck. Images were transferred to a  separate 3-D workstation where multiplanar reformations and 3-D images  were created. Estimates of carotid stenoses are made relative to the  distal internal carotid artery diameters except as noted. Radiation  dose for this scan was reduced using automated exposure control,  adjustment of the mA and/or kV according to patient size, or iterative  reconstruction technique.    COMPARISON: CTA head and neck 6/2/2016     CT ANGIOGRAM HEAD FINDINGS:     The intracranial vertebral arteries, basilar artery, and posterior  cerebral arteries are patent. The internal carotid arteries, anterior  cerebral arteries, and middle cerebral arteries are patent. No  evidence of large vessel occlusion. No evidence of aneurysm or  vascular malformation. Dural venous sinuses are without appreciable  abnormality.     CT ANGIOGRAM NECK FINDINGS:   A four-vessel aortic arch is present with origin of the left vertebral  artery directly from the aortic arch. There is mild focal narrowing of  the left vertebral artery at the mid V2 segment with  focal  intraluminal intimal thickening/protrusion (series 6 image 22). This  is unchanged. The bilateral common carotid, internal carotid, external  carotid, and vertebral arteries are otherwise patent and unremarkable.  No evidence of flow-limiting stenosis at the carotid bifurcations.     Periapical dental abscesses are present involving tooth #8 and 12 and  the remaining right maxillary molar tooth. Multiple cavities are  present. Mild cervical spine degenerative change is present most  conspicuous at C5-6 and C6-7.      Impression    IMPRESSION:   CTA Head:   1. No acute abnormality.  2. High-grade narrowing versus occlusion of the left posterior  cerebral artery at the P2-3 junction, unchanged. No evidence of large  vessel occlusion.     CTA Neck:   1. No acute abnormality.  2. Mild short segment stenosis involving the left vertebral artery V2  segment with focal intimal thickening/protrusion which is likely  related to old injury, unchanged compared to 2016.  3. Otherwise unremarkable CTA of the neck. No evidence of  flow-limiting stenosis at the carotid bifurcations.  4. Severe dental disease with periapical dental abscesses.     KG MSEA MD   MR Brain w/o & w Contrast    Narrative    MRI BRAIN WITHOUT AND WITH CONTRAST  7/26/2019 10:01 AM    HISTORY:  Facial paresthesias, right arm paresthesias, acutely  negative CT/CTA however significant previous stroke burden.     TECHNIQUE:  Multiplanar, multisequence MRI of the brain without and  with 7 mL Gadavist.    COMPARISON: Head CT 7/26/2019, head MRI 6/2/2016    FINDINGS:  A subcentimeter area of diffusion restriction is present  within the left ventral lateral thalamus (series 5 image 65). This is  in a similar location compared to infarct from 6/2/2016. Mild volume  loss is present. Multiple old right occipital and bilateral cerebellar  infarcts are present, unchanged.    The cerebral hemispheres, brainstem, and cerebellum otherwise  demonstrate normal  morphology and signal. No abnormal enhancement or  susceptibility related signal loss is identified. The calvarium,  tympanic cavities, mastoid cavities, and paranasal sinuses are  unremarkable.      Impression    IMPRESSION:  1. Acute subcentimeter infarct within the left ventrolateral thalamus.  This is in a similar location compared to infarct on 6/2/2016 MRI  suggestive of acute on chronic infarct.  2. Volume loss, chronic small vessel ischemic change, and multiple old  right occipital and bilateral cerebellar infarcts.    KG MESA MD   CBC with platelets differential   Result Value Ref Range    WBC 9.1 4.0 - 11.0 10e9/L    RBC Count 4.97 4.4 - 5.9 10e12/L    Hemoglobin 15.2 13.3 - 17.7 g/dL    Hematocrit 46.7 40.0 - 53.0 %    MCV 94 78 - 100 fl    MCH 30.6 26.5 - 33.0 pg    MCHC 32.5 31.5 - 36.5 g/dL    RDW 13.6 10.0 - 15.0 %    Platelet Count 267 150 - 450 10e9/L    Diff Method Automated Method     % Neutrophils 65.1 %    % Lymphocytes 23.5 %    % Monocytes 7.3 %    % Eosinophils 3.2 %    % Basophils 0.7 %    % Immature Granulocytes 0.2 %    Nucleated RBCs 0 0 /100    Absolute Neutrophil 5.9 1.6 - 8.3 10e9/L    Absolute Lymphocytes 2.1 0.8 - 5.3 10e9/L    Absolute Monocytes 0.7 0.0 - 1.3 10e9/L    Absolute Eosinophils 0.3 0.0 - 0.7 10e9/L    Absolute Basophils 0.1 0.0 - 0.2 10e9/L    Abs Immature Granulocytes 0.0 0 - 0.4 10e9/L    Absolute Nucleated RBC 0.0    Basic metabolic panel   Result Value Ref Range    Sodium 142 133 - 144 mmol/L    Potassium 4.0 3.4 - 5.3 mmol/L    Chloride 109 94 - 109 mmol/L    Carbon Dioxide 26 20 - 32 mmol/L    Anion Gap 7 3 - 14 mmol/L    Glucose 94 70 - 99 mg/dL    Urea Nitrogen 21 7 - 30 mg/dL    Creatinine 0.92 0.66 - 1.25 mg/dL    GFR Estimate 90 >60 mL/min/[1.73_m2]    GFR Estimate If Black >90 >60 mL/min/[1.73_m2]    Calcium 8.7 8.5 - 10.1 mg/dL   INR   Result Value Ref Range    INR 0.93 0.86 - 1.14   Partial thromboplastin time   Result Value Ref Range    PTT 31 22 -  37 sec   Troponin I   Result Value Ref Range    Troponin I ES <0.015 0.000 - 0.045 ug/L   Glucose by meter   Result Value Ref Range    Glucose 88 70 - 99 mg/dL   Hemaglobin A1C   Result Value Ref Range    Hemoglobin A1C 5.9 (H) 0 - 5.6 %           Assessment & Plan     Sen was seen today for fatigue.    Diagnoses and all orders for this visit:    Thalamic infarct, acute (H)  -     clopidogrel (PLAVIX) 75 MG tablet; Take 1 tablet (75 mg) by mouth daily  -     NEUROLOGY ADULT REFERRAL    TIA (transient ischemic attack)  -     clopidogrel (PLAVIX) 75 MG tablet; Take 1 tablet (75 mg) by mouth daily  -     NEUROLOGY ADULT REFERRAL    Acute idiopathic gout of right foot  -     diclofenac (VOLTAREN) 50 MG EC tablet; Take 1 tablet (50 mg) by mouth 2 times daily as needed for moderate pain    Chronic right-sided thoracic back pain  -     diclofenac (VOLTAREN) 50 MG EC tablet; Take 1 tablet (50 mg) by mouth 2 times daily as needed for moderate pain    To the emergency department immediately with any returning symptoms  Restart Plavix  Continue Lipitor 40 mg daily  Follow-up appointment with neurology.  He is call today and schedule  Voltaren refilled      Call or return to the clinic with any worsening of symptoms or no resolution. Patient/Parent verbalized understanding and is in agreement. Medication side effects reviewed.   Current Outpatient Medications   Medication Sig Dispense Refill     allopurinol (ZYLOPRIM) 100 MG tablet TAKE ONE TABLET BY MOUTH ONCE DAILY 90 tablet 1     aspirin (ASA) 325 MG tablet Take 325 mg by mouth daily       atorvastatin (LIPITOR) 40 MG tablet Take 1 tablet (40 mg) by mouth daily 90 tablet 2     clopidogrel (PLAVIX) 75 MG tablet Take 1 tablet (75 mg) by mouth daily 30 tablet 2     diclofenac (VOLTAREN) 50 MG EC tablet Take 1 tablet (50 mg) by mouth 2 times daily as needed for moderate pain 180 tablet 1     HERBALS Take 1 each by mouth daily Fermented Chongga       HERBALS Take 1 each by  mouth daily Lions Yann       Chart documentation with Dragon Voice recognition Software. Although reviewed after completion, some words and grammatical errors may remain.    See Patient Instructions    Return in about 3 months (around 1/15/2020).    MYRA Craig [facility]

## 2019-10-15 NOTE — PATIENT INSTRUCTIONS
Patient Education     Discharge Instructions for Transient Ischemic Attack (TIA)  You have been diagnosed with a transient ischemic attack (TIA). You can think of a TIA as a temporary or mini-stroke. Blood temporarily could not reach part of your brain. Unlike a stroke, TIAs usually cause no lasting damage. If you think you are having symptoms of a TIA or stroke, get medical help right away--even if the symptoms go away.   Prevention    Take your medicines exactly as directed. Don t skip doses.    Learn to take your blood pressure. Keep a log for your doctor.    Change your diet if your doctor tells you to. Your doctor may suggest that you cut back on salt. If so, here are some tips:  ? Limit canned, dried, packaged, and fast foods.  ? Don t add salt to your food at the table.  ? Season foods with herbs instead of salt when you cook.    Maintain a healthy weight. Get help to lose any extra pounds.    Begin an exercise program. Ask your doctor how to get started. You can benefit from simple activities, such as walking or gardening.    Limit your alcohol intake to no more than 2 drinks a day.    Know your cholesterol level. Follow your doctor s advice about how to keep cholesterol under control.    If you are a smoker, you need to quit now. Enroll in a stop-smoking program to improve your chances of success. Ask your doctor about medicines or other methods to help you quit.    Your healthcare provider will give you information on dietary changes that you may need to make, based on your situation. Your provider may recommend that you see a registered dietitian for help with diet changes. Changes may include:  ? Reducing fat and cholesterol intake  ? Reducing sodium (salt) intake, especially if you have high blood pressure  ? Increasing your intake of fresh vegetables and fruits  ? Eating lean proteins, such as fish, poultry, and legumes (beans and peas) and eating less red meat and processed meats  ? Using low-fat  dairy products  ? Using vegetable and nut oils in limited amounts  ? Limiting sweets and processed foods such as chips, cookies, and baked goods    If you are overweight, your healthcare provider will work with you to lose weight and lower your body mass index (BMI) to a normal or near-normal level. Making diet changes and increasing physical activity can help.    Begin an exercise program. Ask your doctor how to get started and how much activity you should try to get on a daily or weekly basis. You can benefit from simple activities such as walking or gardening.    Learn stress-management techniques to help you deal with stress in your home and work life.  Follow-up care    Some medicines require blood tests to check for progress or problems. Keep follow-up appointments for any blood tests ordered by your doctors.  Call 911  Call 911 right away if you have any of the following:    Weakness, tingling, or loss of feeling on one side of your face or body    Sudden double vision, or trouble seeing in one or both eyes    Sudden trouble talking, or slurring your speech    Trouble understanding others    Sudden, severe headache    Dizziness, loss of balance, or a spinning feeling, a sense of falling    Blackouts or seizures   Date Last Reviewed: 5/1/2017 2000-2018 [employer]. [address] [place] [address], [place] [address]. All rights reserved. This information is not intended as a substitute for professional medical care. Always follow your healthcare professional's instructions.

## 2019-11-18 NOTE — TELEPHONE ENCOUNTER
Appt Scheduled  E[facility]     Other (Free Text): Discussed treatment options - MOHS vs SRT Detail Level: Zone Note Text (......Xxx Chief Complaint.): This diagnosis correlates with the

## 2020-01-15 ENCOUNTER — TELEPHONE (OUTPATIENT)
Dept: FAMILY MEDICINE | Facility: CLINIC | Age: 60
End: 2020-01-15

## 2020-01-15 NOTE — TELEPHONE ENCOUNTER
See Patient Instructions     Return in about 3 months (around 1/15/2020).     MYRA Craig [facility]    Pt called and appointment made for tomorrow. Sharmin Royal RN

## 2020-01-15 NOTE — TELEPHONE ENCOUNTER
Patient was told to return for fasting labs, reminder letter was sent to patient on 12/10/2019, patient has still not scheduled an appointment.

## 2020-01-16 ENCOUNTER — OFFICE VISIT (OUTPATIENT)
Dept: FAMILY MEDICINE | Facility: CLINIC | Age: 60
End: 2020-01-16
Payer: MEDICARE

## 2020-01-16 VITALS
HEART RATE: 60 BPM | BODY MASS INDEX: 25.71 KG/M2 | SYSTOLIC BLOOD PRESSURE: 124 MMHG | DIASTOLIC BLOOD PRESSURE: 88 MMHG | WEIGHT: 160 LBS | HEIGHT: 66 IN | RESPIRATION RATE: 16 BRPM | TEMPERATURE: 98.2 F

## 2020-01-16 DIAGNOSIS — I63.81 THALAMIC INFARCT, ACUTE (H): ICD-10-CM

## 2020-01-16 DIAGNOSIS — I63.9 OCCIPITAL STROKE (H): ICD-10-CM

## 2020-01-16 DIAGNOSIS — M10.071 ACUTE IDIOPATHIC GOUT OF RIGHT FOOT: ICD-10-CM

## 2020-01-16 DIAGNOSIS — G45.9 TIA (TRANSIENT ISCHEMIC ATTACK): ICD-10-CM

## 2020-01-16 DIAGNOSIS — M1A.09X0 IDIOPATHIC CHRONIC GOUT OF MULTIPLE SITES WITHOUT TOPHUS: ICD-10-CM

## 2020-01-16 DIAGNOSIS — M54.6 CHRONIC RIGHT-SIDED THORACIC BACK PAIN: ICD-10-CM

## 2020-01-16 DIAGNOSIS — G89.29 CHRONIC RIGHT-SIDED THORACIC BACK PAIN: ICD-10-CM

## 2020-01-16 LAB
ANION GAP SERPL CALCULATED.3IONS-SCNC: 5 MMOL/L (ref 3–14)
BASOPHILS # BLD AUTO: 0 10E9/L (ref 0–0.2)
BASOPHILS NFR BLD AUTO: 0.3 %
BUN SERPL-MCNC: 19 MG/DL (ref 7–30)
CALCIUM SERPL-MCNC: 8.7 MG/DL (ref 8.5–10.1)
CHLORIDE SERPL-SCNC: 108 MMOL/L (ref 94–109)
CO2 SERPL-SCNC: 28 MMOL/L (ref 20–32)
CREAT SERPL-MCNC: 0.86 MG/DL (ref 0.66–1.25)
DIFFERENTIAL METHOD BLD: NORMAL
EOSINOPHIL # BLD AUTO: 0.2 10E9/L (ref 0–0.7)
EOSINOPHIL NFR BLD AUTO: 2.6 %
ERYTHROCYTE [DISTWIDTH] IN BLOOD BY AUTOMATED COUNT: 13.9 % (ref 10–15)
GFR SERPL CREATININE-BSD FRML MDRD: >90 ML/MIN/{1.73_M2}
GLUCOSE SERPL-MCNC: 85 MG/DL (ref 70–99)
HCT VFR BLD AUTO: 44.8 % (ref 40–53)
HGB BLD-MCNC: 14.8 G/DL (ref 13.3–17.7)
LDLC SERPL DIRECT ASSAY-MCNC: 60 MG/DL
LYMPHOCYTES # BLD AUTO: 2.8 10E9/L (ref 0.8–5.3)
LYMPHOCYTES NFR BLD AUTO: 37.8 %
MCH RBC QN AUTO: 31 PG (ref 26.5–33)
MCHC RBC AUTO-ENTMCNC: 33 G/DL (ref 31.5–36.5)
MCV RBC AUTO: 94 FL (ref 78–100)
MONOCYTES # BLD AUTO: 0.8 10E9/L (ref 0–1.3)
MONOCYTES NFR BLD AUTO: 10.4 %
NEUTROPHILS # BLD AUTO: 3.6 10E9/L (ref 1.6–8.3)
NEUTROPHILS NFR BLD AUTO: 48.9 %
PLATELET # BLD AUTO: 241 10E9/L (ref 150–450)
POTASSIUM SERPL-SCNC: 3.5 MMOL/L (ref 3.4–5.3)
RBC # BLD AUTO: 4.78 10E12/L (ref 4.4–5.9)
SODIUM SERPL-SCNC: 141 MMOL/L (ref 133–144)
URATE SERPL-MCNC: 3.2 MG/DL (ref 3.5–7.2)
WBC # BLD AUTO: 7.3 10E9/L (ref 4–11)

## 2020-01-16 PROCEDURE — 83721 ASSAY OF BLOOD LIPOPROTEIN: CPT | Performed by: NURSE PRACTITIONER

## 2020-01-16 PROCEDURE — 80048 BASIC METABOLIC PNL TOTAL CA: CPT | Performed by: NURSE PRACTITIONER

## 2020-01-16 PROCEDURE — 85025 COMPLETE CBC W/AUTO DIFF WBC: CPT | Performed by: NURSE PRACTITIONER

## 2020-01-16 PROCEDURE — 84550 ASSAY OF BLOOD/URIC ACID: CPT | Performed by: NURSE PRACTITIONER

## 2020-01-16 PROCEDURE — 99214 OFFICE O/P EST MOD 30 MIN: CPT | Performed by: NURSE PRACTITIONER

## 2020-01-16 PROCEDURE — 36415 COLL VENOUS BLD VENIPUNCTURE: CPT | Performed by: NURSE PRACTITIONER

## 2020-01-16 RX ORDER — CLOPIDOGREL BISULFATE 75 MG/1
75 TABLET ORAL DAILY
Qty: 90 TABLET | Refills: 3 | Status: SHIPPED | OUTPATIENT
Start: 2020-01-16 | End: 2021-09-29

## 2020-01-16 RX ORDER — ALLOPURINOL 100 MG/1
TABLET ORAL
Qty: 90 TABLET | Refills: 1 | Status: SHIPPED | OUTPATIENT
Start: 2020-01-16 | End: 2021-04-07

## 2020-01-16 RX ORDER — ATORVASTATIN CALCIUM 40 MG/1
40 TABLET, FILM COATED ORAL DAILY
Qty: 90 TABLET | Refills: 2 | Status: SHIPPED | OUTPATIENT
Start: 2020-01-16 | End: 2021-09-29

## 2020-01-16 ASSESSMENT — MIFFLIN-ST. JEOR: SCORE: 1478.51

## 2020-01-16 NOTE — PROGRESS NOTES
Subjective     Sen Watkins is a 59 year old male who presents to clinic today for the following health issues:    HPI   Follow up stroke      Is wanting to follow up from last visit - has taken time off work due to stress    Strength is coming back   Moods are more stable. Reading more about the mind and spirituality.   Reading now 2 hours now. Vision has been ok. Still uses [employer]aters for reading.   Not needed for distance at this point.   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   Not out and about much. Worked last week for 3 hours.   Walking more now up to 3 blocks.   No work x 2 -3 months now. Increased stress so took some time off.   Right arm numbness and thick feeling since 2016    Neurology consult..  No follow up  Neuropsychiatry follow up .. none    BP Readings from Last 3 Encounters:   20 124/88   10/15/19 102/70   19 102/80     Egg sandwich today.      -------------------------------------    Patient Active Problem List   Diagnosis     CARDIOVASCULAR SCREENING; LDL GOAL LESS THAN 130     Intermittent asthma     Nephrolithiasis     Advanced directives, counseling/discussion     Acute ischemic stroke (H)     Acute idiopathic gout of right foot     Screening for prostate cancer     Occipital stroke (H)     Thalamic infarct, acute (H)     Situational anxiety     Osteoarthritis of right foot, unspecified osteoarthritis type     No past surgical history on file.    Social History     Tobacco Use     Smoking status: Former Smoker     Types: Cigarettes     Last attempt to quit: 2012     Years since quittin.7     Smokeless tobacco: Former User   Substance Use Topics     Alcohol use: No     Alcohol/week: 0.0 standard drinks     Family History   Problem Relation Age of Onset     Coronary Artery Disease Mother      Cerebrovascular Disease Brother      Psychotic Disorder Sister      Psychotic Disorder Brother       "      -------------------------------------  Reviewed and updated as needed this visit by Provider         Review of Systems   ROS COMP: Constitutional, HEENT, cardiovascular, pulmonary, GI, , musculoskeletal, neuro, skin, endocrine and psych systems are negative, except as otherwise noted.      Objective    /88   Pulse 60   Temp 98.2  F (36.8  C) (Tympanic)   Resp 16   Ht 1.676 m (5' 6\")   Wt 72.6 kg (160 lb)   BMI 25.82 kg/m    Body mass index is 25.82 kg/m .  Physical Exam   GENERAL: healthy, alert and no distress  EYES: Eyes grossly normal to inspection, PERRL and conjunctivae and sclerae normal  HENT: ear canals and TM's normal, nose and mouth without ulcers or lesions  NECK: no adenopathy, no asymmetry, masses, or scars and thyroid normal to palpation  RESP: lungs clear to auscultation - no rales, rhonchi or wheezes  CV: regular rate and rhythm, normal S1 S2, no S3 or S4, no murmur, click or rub, no peripheral edema and peripheral pulses strong  ABDOMEN: soft, nontender, no hepatosplenomegaly, no masses and bowel sounds normal  MS: no gross musculoskeletal defects noted, no edema  SKIN: no suspicious lesions or rashes  NEURO: Normal strength and tone, mentation intact and speech normal  PSYCH: mentation appears normal, affect normal/bright    Diagnostic Test Results:  Labs reviewed in Epic  Results for orders placed or performed in visit on 01/16/20   CBC with platelets and differential     Status: None   Result Value Ref Range    WBC 7.3 4.0 - 11.0 10e9/L    RBC Count 4.78 4.4 - 5.9 10e12/L    Hemoglobin 14.8 13.3 - 17.7 g/dL    Hematocrit 44.8 40.0 - 53.0 %    MCV 94 78 - 100 fl    MCH 31.0 26.5 - 33.0 pg    MCHC 33.0 31.5 - 36.5 g/dL    RDW 13.9 10.0 - 15.0 %    Platelet Count 241 150 - 450 10e9/L    % Neutrophils 48.9 %    % Lymphocytes 37.8 %    % Monocytes 10.4 %    % Eosinophils 2.6 %    % Basophils 0.3 %    Absolute Neutrophil 3.6 1.6 - 8.3 10e9/L    Absolute Lymphocytes 2.8 0.8 - 5.3 " 10e9/L    Absolute Monocytes 0.8 0.0 - 1.3 10e9/L    Absolute Eosinophils 0.2 0.0 - 0.7 10e9/L    Absolute Basophils 0.0 0.0 - 0.2 10e9/L    Diff Method Automated Method            Assessment & Plan     Sen was seen today for neurologic problem.    Diagnoses and all orders for this visit:    Thalamic infarct, acute (H)  -     clopidogrel (PLAVIX) 75 MG tablet; Take 1 tablet (75 mg) by mouth daily  -     atorvastatin (LIPITOR) 40 MG tablet; Take 1 tablet (40 mg) by mouth daily  -     Basic metabolic panel  (Ca, Cl, CO2, Creat, Gluc, K, Na, BUN)    Occipital stroke (H)  -     atorvastatin (LIPITOR) 40 MG tablet; Take 1 tablet (40 mg) by mouth daily  -     Basic metabolic panel  (Ca, Cl, CO2, Creat, Gluc, K, Na, BUN)    TIA (transient ischemic attack)  -     clopidogrel (PLAVIX) 75 MG tablet; Take 1 tablet (75 mg) by mouth daily  -     LDL cholesterol direct  -     CBC with platelets and differential  -     Cancel: Comprehensive metabolic panel  -     Cancel: Basic metabolic panel  (Ca, Cl, CO2, Creat, Gluc, K, Na, BUN)  -     Basic metabolic panel  (Ca, Cl, CO2, Creat, Gluc, K, Na, BUN)    Idiopathic chronic gout of multiple sites without tophus  -     allopurinol (ZYLOPRIM) 100 MG tablet; TAKE ONE TABLET BY MOUTH ONCE DAILY  -     **Uric acid FUTURE 2mo  -     Basic metabolic panel  (Ca, Cl, CO2, Creat, Gluc, K, Na, BUN)    Acute idiopathic gout of right foot  -     diclofenac (VOLTAREN) 50 MG EC tablet; Take 1 tablet (50 mg) by mouth 2 times daily as needed for moderate pain  -     Cancel: **ALT FUTURE anytime  -     Cancel: **AST FUTURE 2mo  -     Cancel: Basic metabolic panel  (Ca, Cl, CO2, Creat, Gluc, K, Na, BUN)  -     Basic metabolic panel  (Ca, Cl, CO2, Creat, Gluc, K, Na, BUN)    Chronic right-sided thoracic back pain  -     diclofenac (VOLTAREN) 50 MG EC tablet; Take 1 tablet (50 mg) by mouth 2 times daily as needed for moderate pain  -     Basic metabolic panel  (Ca, Cl, CO2, Creat, Gluc, K, Na,  "BUN)    Again discussed follow-up with neurology and neuropsychology  Phone numbers are given.  He is to call and schedule  Lab orders are placed today  Continue Plavix 75 mg daily  Continue Lipitor 40 mg daily  Continue allopurinol 100 mg daily  Continue Voltaren 50 mg twice daily     BMI:   Estimated body mass index is 25.82 kg/m  as calculated from the following:    Height as of this encounter: 1.676 m (5' 6\").    Weight as of this encounter: 72.6 kg (160 lb).           CONSULTATION/REFERRAL to neurology and neuropsychology  Regular exercise  Call or return to the clinic with any worsening of symptoms or no resolution. Patient/Parent verbalized understanding and is in agreement. Medication side effects reviewed.   Current Outpatient Medications   Medication Sig Dispense Refill     allopurinol (ZYLOPRIM) 100 MG tablet TAKE ONE TABLET BY MOUTH ONCE DAILY 90 tablet 1     aspirin (ASA) 325 MG tablet Take 325 mg by mouth daily       atorvastatin (LIPITOR) 40 MG tablet Take 1 tablet (40 mg) by mouth daily 90 tablet 2     clopidogrel (PLAVIX) 75 MG tablet Take 1 tablet (75 mg) by mouth daily 90 tablet 3     diclofenac (VOLTAREN) 50 MG EC tablet Take 1 tablet (50 mg) by mouth 2 times daily as needed for moderate pain 180 tablet 1     HERBALS Take 1 each by mouth daily Fermented Chongga       HERBALS Take 1 each by mouth daily Joselin Lerner       Chart documentation with Dragon Voice recognition Software. Although reviewed after completion, some words and grammatical errors may remain.    See Patient Instructions    Return in about 6 months (around 7/16/2020) for  , Mental Health.    MYRA Craig [facility]      "

## 2020-01-16 NOTE — NURSING NOTE
"Chief Complaint   Patient presents with     Neurologic Problem     follow up from last time       Initial /88   Pulse 60   Temp 98.2  F (36.8  C) (Tympanic)   Resp 16   Ht 1.676 m (5' 6\")   Wt 72.6 kg (160 lb)   BMI 25.82 kg/m   Estimated body mass index is 25.82 kg/m  as calculated from the following:    Height as of this encounter: 1.676 m (5' 6\").    Weight as of this encounter: 72.6 kg (160 lb).    Patient presents to the clinic using No DME    Health Maintenance that is potentially due pending provider review:  NONE    n/a    Is there anyone who you would like to be able to receive your results? No  If yes have patient fill out MAY[facility]    "

## 2020-01-16 NOTE — PATIENT INSTRUCTIONS
Patient Education     Healthy Lifestyle to Prevent Another Stroke  Breaking old habits can be hard. But when your health is at stake, it s never too late to make changes for the better. Some lifestyle changes might be easy for you. Others might be tough. So if you need help, talk with your doctor, family, and friends.  Make healthy changes    Diet. Your healthcare provider will give you information on dietary changes that you may need to make, based on your situation. Your provider may recommend that you see a registered dietitian for help with diet changes.      Weight management. If you are overweight, your healthcare provider will work with you to lose weight and lower your BMI (body mass index) to a normal or near-normal level. Making diet changes and increasing physical activity can help.      Stop smoking. If you smoke, the time to quit is now. There s no more time for excuses. Smoking raises blood pressure and damages arteries--both of which can lead to a stroke. To stop smoking, ask your doctor for help. Join a stop-smoking program. Make a list of reasons to quit, including that you'll lower your risk of lung cancer, and read it daily.    Limit alcohol. Drinking too much alcohol can raise blood pressure and increase the risk for stroke. Alcohol can also react with certain medicines. Ask your doctor if it s safe for you to drink alcohol.    Get support.  A stroke can leave you feeling frustrated or depressed. Don t ignore your feelings, but don t dwell on them either. Focus on what you can do. Talking to family, friends, your doctor, or clergy can also help.    Reduce stress. Stress can make your heart work harder and raise blood pressure. To reduce stress, try to let go of daily annoyances. Ask yourself if problems will still matter a week from now. Getting proper rest can also help. Finally, don t be embarrassed to ask for help when you need it.    Exercise. Strength and aerobic training improves your  ability to function and do activities of daily living. It also reduces your risk for another stroke. Develop a custom plan with your physical therapist to meet activity goals.    If you have high blood pressure    One of the most important things you can do to prevent another stroke is to keep your blood pressure under control. If you have high blood pressure:    Take all your medicines as directed.    Get regular exercise. Try to work up to getting at least 40 minutes of moderate to high intensity physical activity at least 3 to 4 days each week.     Talk with your doctor about limiting fat and salt in your diet. You most likely will be told to limit your salt intake to 2,400 milligrams (mg) or less each day.     Check your blood pressure regularly. Write down your numbers and bring them to checkups with your doctor.  Manage other health problems  Strokes are often closely related to certain health problems. These include high blood pressure, heart disease, and diabetes. If you have any of these conditions, it s more important than ever to keep them under control. Do this by taking any prescribed medicines and having regular checkups. Keep in mind, too, that the same healthy lifestyle choices that prevent stroke will also help control these health problems.  For family and friends  It s much harder for your loved one to make lifestyle changes if he or she is feeling low. So be on the lookout for sadness, depression, or hopelessness. These feelings are common after a stroke. Talk with the doctor if you have concerns.  Date Last Reviewed: 5/1/2017 2000-2019 [employer]. [address] [place] [address], [place] [address]. All rights reserved. This information is not intended as a substitute for professional medical care. Always follow your healthcare professional's instructions.           Patient Education     Preventing Recurrent Stroke: Getting Active  Being active is key to preventing stroke. It s good for  your heart and lowers high blood pressure. It also helps you keep doing independent activities of daily living. Moving around also helps you recover lost skills. It s best if you re active at least 30 minutes each day. But this doesn t have to be at a gym. The key is finding things that fit your lifestyle and abilities. If you have trouble moving around, your doctor may prescribe physical therapy. The physical therapist can help you  develop physical activity goals and provide an exercise prescription. Low- to moderate-intensity aerobic and muscle-strengthening activities are an important part of recovery.    Ways to get moving  After a stroke, you may not be able to do everything you used to. But there are still simple ways you can stay active:    [place] leaves or work in the garden.    Play with children or grandchildren.    Work on a hobby.    Park farther away from building entrances and walk.    Sweep or vacuum your living space.    Use the stairs instead of the elevator. If walking up is too strenuous, you can start by walking down.     If walking is hard, try stretching exercises or swimming.  Walk every day  Walking is great exercise. It s free, easy, and all you need is a good pair of shoes. Start with short walks. Then go a little farther each week. The tips below can help:    Warm up. Start off with a few minutes of strolling. Then walk at a brisker pace.    Walk every chance you get. Walk to do errands, for fun, or to visit friends. Visit a local park or explore your neighborhood.    Take a friend along. Having company can make it more fun.    Walk farther each week. Try walking a little farther or longer each week. You may be surprised by how fast you improve!  When to stop  If you re new to exercising, it s normal to feel a little sore afterward. But you should stop right away if you:    Have trouble breathing.    Feel dizzy or extremely tired.    Have sharp pain.  Stick with it  Some days it may seem  hard to be active. Plan ways to keep going anyway. Your health and life are on the line. Make a list of things that might keep you from exercising. Then write down what you can do to get around those things.   For family and friends  It s much easier for your loved one to be active when you join in. Try these tips:    Go for walks together.    Ask your loved one to join in during activities, such as making dinner.    If he or she starts making excuses, suggest ways to overcome the roadblocks.    Cheer every improvement!   Date Last Reviewed: 5/1/2017 2000-2019 [employer]. [address] [facility], [place] [address]. All rights reserved. This information is not intended as a substitute for professional medical care. Always follow your healthcare professional's instructions.           Patient Education     Preventing Recurrent Stroke: Eating Healthy  Eating healthy foods helps lower cholesterol and reduce plaque buildup in arteries. It can also help you lose weight and keep high blood pressure under control. Eating better doesn t necessarily mean going on a special diet, unless you have diabetes or high blood pressure. Instead, the idea is to make healthier choices by limiting foods and ingredients that contribute to risk factors for stroke.  Meats  Instead of:    Beef and other red meats    Hamburger    Processed lunch meats  Try:    Fish, skinless chicken, or tofu    Ground turkey    Chicken or turkey breast slices Sweets and snacks  Instead of:    Soda pop    Chips and other salty snacks    Donuts and croissants  Try:    Water    Nuts, seeds, air-popped popcorn    Fresh fruit, whole-grain raisin bread   Grains  Instead of:    White bread    White rice    Regular pasta or noodles    White potatoes  Try:    Whole-grain bread    Brown rice    Whole-grain pasta or noodles    Sweet potatoes Dairy  Instead of:    Whole milk    Regular cheese and mayonnaise    Ice cream    Butter  Try:    1% or skim  milk    Low-fat cheese and mayonnaise    Low-fat yogurt    Olive or canola oil     Choose the right mix of foods  The key to good eating is having a variety of healthy foods. Try to plan meals around vegetables, fruits, lean meats, and whole grains. Limit fatty meats and high-fat dairy products. The chart below can show you the best way to fill up your plate.  1. Drink water or low-fat (1% of fat-free skim) milk with meals. Avoid sugary sodas and salty vegetable juices.  2. At least half the plate should be vegetables and fruits. Limit fatty toppings, such as butter, salad dressing, and sour cream.  3. No more than one-quarter of the plate should be meat or other protein. Fish, beans, tofu, and lean cuts of poultry are best. Bake or broil meat instead of frying.  4. About one-quarter of the plate can be starchy foods, such as rice and potatoes. Whole-grains, such as brown rice or whole-wheat bread, are best.  Try healthier options  Giving up old food habits doesn t have to be hard. Encouragement makes it easier to stick with a healthy eating plan. Here are some easy ways to choose healthier options:  Choose fats wisely  Reducing  bad  fats in your diet helps keep your arteries healthier. Use this guide:    Choose unsaturated fats. These are found in foods such as fish, nuts, olive oil, canola oil, and avocados. In moderation, these fats can be good for you.    Limit saturated fats. These are found in meat and dairy foods, such as burgers, poultry skin, milk, cheese, and butter.    Avoid trans fats. These are often found in processed foods. Avoid any food that has the word  hydrogenated  in its ingredients.  Reduce sodium (salt)  You may be asked to eat less sodium (mainly found in salt). If you have high blood pressure, your healthcare provider will probably recommend that you limit your sodium intake to 1,500 mg to 2,400 mg per day. Use these tips:    Look for food labels that say  salt free  or  very low sodium.   "Always check for the number of servings per container on the food label, as a container of food may have more than 1 serving.     Avoid canned and packaged foods such as canned soup, instant noodles, TV dinners, and premade sauces.    Don t add salt or soy sauce to meals. Use fresh herbs or lemon juice for seasoning. Your taste buds will adjust.    Avoid fast food. Look for  heart healthy  items on restaurant menus. These are often lower in fat and salt.     For family and friends  Good eating habits are easier when everyone joins in.    Help shop for healthy foods. Choose lots of fresh fruits and vegetables.    It s most helpful if everyone in the family eats healthy foods.   Date Last Reviewed: 5/1/2017 2000-2019 [employer]. [address] [place] [address], [place] [address]. All rights reserved. This information is not intended as a substitute for professional medical care. Always follow your healthcare professional's instructions.           Patient Education     Stroke--Self-Care  Routine tasks may be hard after you ve had a stroke (brain attack). But many people can learn ways to manage their daily activities. In fact, daily activities may help you to regain muscle strength and bring back function to affected limbs.  Bathing and dressing  By learning a few new ways of doing things, most people who have had a stroke can bathe and dress themselves. You may want to try the following:    Test water temperature with a hand or foot that was not affected by the stroke.    Use grab bars, a shower seat, a hand-held shower, and a long-handled brush.    Dress while sitting, starting with the affected side or limb.    Put on shirts that pull over the head, and pants or skirts with elastic waistbands.    Use zippers with loops attached to them.    Visit the hair salon weekly, or change to a \"wash and wear\" hairstyle to avoid using blow dryers and curling irons.    Use electric lindsey instead of razors to avoid " injuries.    Review grooming with your occupational therapist.  Managing bladder and bowel problems  After your stroke, you may not be able to control your bladder and bowels. Nurses will work closely with you to set up a new routine.    You may be taken to the toilet on a schedule -- perhaps every 2 hours to 3 hours. Making a bathroom stop before going out may also work well.    A time may be set to empty the bowel. This will help train your bladder and bowels to go at specific intervals.    Absorbent briefs or a condom catheter (a small bag that fits over the penis) may be used.    You can use adult diapers if you need to.    Drink fluids (especially caffeine and alcohol) in the daytime and limit them in the evening to avoid having to use the bathroom at night.  Date Last Reviewed: 8/1/2017 2000-2019 [employer]. [address] [place] [address], [place] [address]. All rights reserved. This information is not intended as a substitute for professional medical care. Always follow your healthcare professional's instructions.

## 2020-05-29 NOTE — TELEPHONE ENCOUNTER
Discharge Summary    Patient: Igor Cavazos MRN: [id number]  CSN: [id number]    YOB: 1940  Age: 79 y.o. Sex: female    DOA: 5/27/2020 LOS:  LOS: 2 days   Discharge Date:      Primary Care Provider:  Cliff Randall MD    Admission Diagnoses: Metabolic encephalopathy [O20.27]    Discharge Diagnoses:    Problem List as of 5/29/2020 Date Reviewed: 5/27/2020          Codes Class Noted - Resolved    Stenosis of cervical spine ICD-10-CM: M48.02  ICD-9-CM: 723.0  5/29/2020 - Present        Mass of left side of neck ICD-10-CM: R22.1  ICD-9-CM: 784.2  5/29/2020 - Present        * (Principal) Metabolic encephalopathy IJI-04-AM: G93.41  ICD-9-CM: 348.31  5/27/2020 - Present        PVD (peripheral vascular disease) ([facility] (Chronic) ICD-10-CM: I73.9  ICD-9-CM: 443.9  4/11/2017 - Present        ESRD (end stage renal disease) ([facility] ICD-10-CM: N18.6  ICD-9-CM: 585.6  2/11/2014 - Present        Seizure disorder ([facility] ICD-10-CM: G40.909  ICD-9-CM: 345.90  2/11/2014 - Present        HTN (hypertension) ICD-10-CM: I10  ICD-9-CM: 401.9  2/11/2014 - Present        RESOLVED: Metabolic encephalopathy OSX-79-VI: G93.41  ICD-9-CM: 348.31  2/11/2014 - 2/11/2014        RESOLVED: Hypercalcemia ICD-10-CM: D13.22  ICD-9-CM: 275.42  2/11/2014 - 2/11/2014              Discharge Medications:     Current Discharge Medication List      CONTINUE these medications which have NOT CHANGED    Details   HYDROcodone-acetaminophen (NORCO) 5-325 mg per tablet Take 1 Tab by mouth every four (4) hours as needed for Pain. Max Daily Amount: 6 Tabs. Qty: 30 Tab, Refills: 0    Associated Diagnoses: ESRD (end stage renal disease) ([address]      calcium acetate (PHOSLO) 667 mg cap Take 2 Caps by mouth two (2) times a day. atorvastatin (LIPITOR) 10 mg tablet Take 10 mg by mouth daily. sevelamer (RENAGEL) 800 mg tablet Take  by mouth three (3) times daily (with meals). losartan (COZAAR) 100 mg tablet Take 1 Tab by mouth daily.   Qty: 30 Tab, Naproxen      Last Written Prescription Date: 01/05/17  Last Quantity: 30, # refills: 0  Last Office Visit with [facility] or [facility] prescribing provider: 02/02/17       Creatinine   Date Value Ref Range Status   02/02/2017 0.91 0.66 - 1.25 mg/dL Final     Lab Results   Component Value Date    AST 38 02/02/2017     Lab Results   Component Value Date    ALT 58 02/02/2017     BP Readings from Last 3 Encounters:   02/02/17 122/62   01/17/17 117/76   01/02/17 97/64   Thanks,   Carin Dunlap  Certified Pharmacy Technician  [facility]  [phone]       Refills: 0      aspirin 81 mg chewable tablet Take 81 mg by mouth daily. Discharge Condition: Good    Procedures : None    Consults: Nephrology and Neurology      PHYSICAL EXAM   Visit Vitals  /56   Pulse 80   Temp 98 °F (36.7 °C) (Oral)   Resp 16   Ht 5' 1\" (1.549 m)   Wt 61.5 kg (135 lb 9.3 oz)   SpO2 96%   BMI 25.62 kg/m²     General: Awake, cooperative, no acute distress    HEENT: NC, Atraumatic. PERRLA, EOMI. Anicteric sclerae. Left submandibular swelling. Lungs:  CTA Bilaterally. No Wheezing/Rhonchi/Rales. Heart:  Regular  rhythm,  No murmur, No Rubs, No Gallops  Abdomen: Soft, Non distended, Non tender. +Bowel sounds,   Extremities: No c/c/e  Psych:   Not anxious or agitated. Neurologic:  No acute neurological deficits. Admission HPI :   Myesha Mathew is a 79 y.o.  female who has ESRD on dialysis MWF, DM CAD and AVR  ? Seizure   Presents to Dr. Koffi Becker office with episodes of confusion /undressing and forgetfulness per  ; Has had frequent falls with loss of balance ; patient unclear if she hit her head   [place] Hospitalization for dyspnea       Hospital Course :   Ms. Alber Paz was admitted to medical floor. She was seen by neurology and seen and followed by nephrology. Encephalopathy -   She was seen by neurology. CT head reviewed, she also had MRI brain done that did not show any acute infarct or other acute intracranial finding. There are multiple small chronic cortical infarcts. Neurology did not recommend further testing. Her symptoms likely from her dementia. She will follow up with neurology as outpatient. ESRD -   Received HD per nephrology.     Mass left submandibular/parotid -   Per records PCP aware and initiate ENT referral. Follow up with PCP, ENT    Cervical spine stenosis -   No acute neurologic deficit, need to follow up with spine surgery as outpatient, however she is at high risk for any surgery. For her other chronic stable problems, we continued home medications. Activity: Activity as tolerated    Diet: Regular Diet    Follow-up: PCP, nephrology    Disposition: home with home health    Minutes spent on discharge: 45       Labs: Results:       Chemistry Recent Labs     05/29/20 0357 05/27/20  1431   * 131*    140   K 4.0 3.2*    102   CO2 28 34*   BUN 31* 15   CREA 7.35* 4.50*   CA 9.4 9.3  9.2   AGAP 8 4   BUCR 4* 3*   AP 50 64   TP 6.0* 6.9   ALB 3.1* 3.6   GLOB 2.9 3.3   AGRAT 1.1 1.1      CBC w/Diff Recent Labs     05/29/20 0357 05/27/20  1431   WBC 7.0 5.2   RBC 3.10* 3.41*   HGB 9.9* 10.8*   HCT 31.5* 34.0*   PLT 89* 98*   GRANS 84* 74*   LYMPH 9* 18*   EOS 2 2      Cardiac Enzymes Recent Labs     05/27/20  2130 05/27/20  1431   CPK 48 50   CKND1 CALCULATION NOT PERFORMED WHEN RESULT IS BELOW LINEAR LIMIT 2.4      Coagulation No results for input(s): PTP, INR, APTT, INREXT, INREXT in the last 72 hours. Lipid Panel No results found for: CHOL, CHOLPOCT, CHOLX, CHLST, CHOLV, 523504, HDL, HDLP, LDL, LDLC, DLDLP, 926185, VLDLC, VLDL, TGLX, TRIGL, TRIGP, TGLPOCT, CHHD, CHHDX   BNP No results for input(s): BNPP in the last 72 hours. Liver Enzymes Recent Labs     05/29/20 0357   TP 6.0*   ALB 3.1*   AP 50      Thyroid Studies Lab Results   Component Value Date/Time    TSH 1.21 02/08/2014 05:10 AM            Significant Diagnostic Studies: Mri Brain Wo Cont    Result Date: 5/29/2020  EXAM: MRI brain without gadolinium CLINICAL INDICATION/HISTORY: Frequent falls, metabolic encephalopathy, diabetes, hypertension, renal failure on dialysis   > Additional: None. COMPARISON: 2/2/2014   > Reference Exam: CT head 5/27/2020 TECHNIQUE: Sagittal T1, axial T1, T2, FLAIR, T2 gradient, diffusion and coronal T1 and T2 sequences obtained of the brain. _______________ FINDINGS: Diffusion:  There are no areas of restricted diffusion, no evidence of an acute infarction.  Brain parenchyma: There has been interval development of small chronic cortical infarcts posterior right frontal and parietal and parieto-occipital regions. Persistent mild confluent and multifocal increased T2 and FLAIR signal periventricular and deep cerebral white matter and cristina. Several small low gradient signal foci involving right central cristina, right parietotemporal region, right parietal and left frontal cortex. No evidence of brain parenchymal mass edema or mass effect. Ventricles and sulci:  Stable moderate ventriculomegaly and mild to moderate diffuse cortical sulcal prominence. Extra-axial:  No extra-axial fluid collection or mass is noted. Brain vasculature:  No vascular abnormality is appreciated on this routine brain MR examination. Craniocervical junction:  Normal. Skull base, extracranial and calvarium:  There is a smoothly marginated oval-shaped mass along the medial aspect of the left deep parotid which has homogeneous mild low T1 signal and mild decrease T2 signal, which measures maximally 4.5 x 3.3 cm in greatest cephalocaudad and transverse dimensions. This has not significantly changed in size or appearance since prior study. Orbits, paranasal sinuses, sella, IACs and mastoids unremarkable. No skull abnormality. _______________     IMPRESSION: 1. No evidence of acute infarct or other acute intracranial finding. 2. Multiple small new chronic cortical infarcts right posterior frontal and parietal lobe and parieto-occipital region. 3. Stable moderate ventriculomegaly likely due to central volume loss with diffuse cortical volume loss. 4. Stable mild white matter signal changes likely chronic microvascular ischemic disease. 5. Several small likely chronic microbleeds cerebrum and cristina usually related to hypertension. 6. 4.5 cm diameter mass along medial aspect deep left parotid, not significantly changed since MRI 2014, likely benign or low malignant potential lesion.     Ct Head Wo Cont    Result Date: 5/27/2020  EXAM: CT head INDICATION: Fall confusion and weakness COMPARISON: None. TECHNIQUE: Axial CT imaging of the head was performed without intravenous contrast. One or more dose reduction techniques were used on this CT: automated exposure control, adjustment of the mAs and/or kVp according to patient size, and iterative reconstruction techniques. The specific techniques used on this CT exam have been documented in the patient's electronic medical record. Digital Imaging and Communications in Medicine (DICOM) format image data are available to nonaffiliated external healthcare facilities or entities on a secure, media free, reciprocally searchable basis with patient authorization for at least a 12-month period after this study. _______________ FINDINGS: BRAIN AND POSTERIOR FOSSA: There is age-appropriate atrophy. There is no intracranial hemorrhage, mass effect, or midline shift. There is moderate small vessel ischemic disease. There is mild ventriculomegaly which may be secondary to atrophy. EXTRA-AXIAL SPACES AND MENINGES: There are no abnormal extra-axial fluid collections. CALVARIUM: Intact. SINUSES: Clear. OTHER: None. _______________     IMPRESSION: No acute intracranial abnormalities. Atrophy with small vessel ischemic disease. Mild ventriculomegaly which may be secondary to atrophy. Correlate with clinical findings to exclude normal pressure hydrocephalus. Ct Spine Cerv Wo Cont    Result Date: 5/27/2020  EXAM: CT cervical spine INDICATION: Fall COMPARISON: None. TECHNIQUE: Axial CT imaging of the cervical spine was performed from the skull base to the thoracic inlet without intravenous contrast. Multiplanar reformats were generated. One or more dose reduction techniques were used on this CT: automated exposure control, adjustment of the mAs and/or kVp according to patient size, and iterative reconstruction techniques.   The specific techniques used on this CT exam have been documented in the patient's electronic medical record. Digital Imaging and Communications in Medicine (DICOM) format image data are available to nonaffiliated external healthcare facilities or entities on a secure, media free, reciprocally searchable basis with patient authorization for at least a 12-month period after this study. _______________ FINDINGS: VERTEBRAE AND DISCS: Vertebral alignment and articulation are within normal limits. Vertebral body heights are maintained. Mild disc space narrowing present at C5-C6. Specifically, no compression deformities are noted and there is no spondylolisthesis. No displaced fractures are identified. There are no significant areas of bone lucency or sclerosis. SPINAL CANAL AND FORAMINA: C3-C4 demonstrates broad based posterior disc bulge mildly indenting on the thecal sac. C4-C5 demonstrates posterior and lateral disc osteophyte complex moderately narrowing both neural foramen and resulting in mild spinal stenosis. C5-C6 demonstrates broad based posterior disc bulge resulting in moderate spinal stenosis. PREVERTEBRAL SOFT TISSUES: Normal. VISIBLE PORTIONS OF POSTERIOR FOSSA/BRAIN: Normal. LUNG APICES: Clear. OTHER: There is a large 3.9 x 2.5 x 4.2 cm left neck mass. Advise a dedicated CT of the neck with IV contrast for further evaluation on a nonemergent basis. _______________     IMPRESSION: No acute fractures or listhesis. Broad-based posterior disc bulge at C5-C6 resulting in moderate spinal stenosis. Spondylosis at other levels as described. There is a left-sided neck mass measuring 3.9 x 2.5 x 4.2 cm. Advise a dedicated CT of the neck with IV contrast for further evaluation on nonemergent basis.     Ct Spine Lumb Wo Cont    Result Date: 5/27/2020  EXAM: CT of the lumbar spine without contrast INDICATION: Fall confusion weakness COMPARISON: CT abdomen and pelvis dated June 17, 2015 TECHNIQUE: Axial CT imaging of the lumbar spine was performed without intravenous contrast. Multiplanar reformats were generated. One or more dose reduction techniques were used on this CT: automated exposure control, adjustment of the mAs and/or kVp according to patient size, and iterative reconstruction techniques. The specific techniques used on this CT exam have been documented in the patient's electronic medical record. Digital Imaging and Communications in Medicine (DICOM) format image data are available to nonaffiliated external healthcare facilities or entities on a secure, media free, reciprocally searchable basis with patient authorization for at least a 12-month period after this study. _______________ FINDINGS: There is osteopenia. There are no acute fractures. There is minimal grade 1 anterolisthesis at L4-L5 likely degenerative. There is no spondylolysis. There are degenerative changes of both SI joints with vacuum phenomenon. Severe facet arthropathy present at L4-L5 and L5-S1. Paraspinal soft tissues are normal. There is atrophy of both kidneys. Severe calcific atherosclerotic present. The uterus unremarkable for patient's age. Severe calcific atherosclerosis present. 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 Colonic diverticulosis present. Visualized adrenals, liver and spleen are unremarkable. Visualized pancreas is unremarkable. L1-L2 demonstrate significant facet arthropathy and hypertrophy ligamentum flavum resulting in moderate to severe spinal stenosis. L2-L3 demonstrates annular and lateral disc osteophyte complex and facet arthropathy resulting in severe spinal and foraminal stenosis. L3-L4 demonstrates severe facet arthropathy, hypertrophy ligamentum flavum and annular and lateral disc osteophyte complex resulting in severe foraminal and spinal stenosis.  L4-L5 interspace changes of grade 1 anterolisthesis along with severe facet arthropathy, hypertrophy ligamentum flavum and annular lateral disc osteophyte complex resulting in severe foraminal stenosis and severe spinal stenosis. _______________     IMPRESSION: Severe spondylosis as described. No acute fractures. Minimal grade 1 anterolisthesis at L4-L5 likely degenerative. Xr Chest Port    Result Date: 5/27/2020  EXAM: CHEST RADIOGRAPH, SINGLE VIEW CLINICAL INDICATION/HISTORY: sob COMPARISON: Single view chest 2/3/2014 TECHNIQUE: Portable frontal view of the chest was obtained. _______________ FINDINGS: SUPPORT DEVICES: None. HEART AND MEDIASTINUM: Patient is undergone sternotomy with aortic valve replacement since previous examination. Heart and pulmonary vascularity are normal. Aorta is calcified. LUNGS AND PLEURAL SPACES: Suboptimal inspiration. There is an area of discoid atelectasis or scarring in the right lower lung field which is new compared to prior exam. No focal infiltrate, effusion or pneumothorax. BONY THORAX AND SOFT TISSUES: No acute osseous abnormality. _______________     IMPRESSION: 1. Small small area of discoid atelectasis or scarring right lower lung field. No acute infiltrate. No results found for this or any previous visit. Please note that this dictation was completed with [employer], the [employer] voice recognition software. Quite often unanticipated grammatical, syntax, homophones, and other interpretive errors are inadvertently transcribed by the computer software. Please disregard these errors. Please excuse any errors that have escaped final proofreading.      CC: Bárbara Ag MD

## 2020-06-11 DIAGNOSIS — M54.6 CHRONIC RIGHT-SIDED THORACIC BACK PAIN: ICD-10-CM

## 2020-06-11 DIAGNOSIS — G89.29 CHRONIC RIGHT-SIDED THORACIC BACK PAIN: ICD-10-CM

## 2020-06-11 DIAGNOSIS — M10.071 ACUTE IDIOPATHIC GOUT OF RIGHT FOOT: ICD-10-CM

## 2020-06-11 NOTE — TELEPHONE ENCOUNTER
"Routing refill request to provider for review/approval because:  Labs not current  Requested Prescriptions   Pending Prescriptions Disp Refills     diclofenac (VOLTAREN) 50 MG EC tablet [Pharmacy Med Name: DICLOFENAC SODIUM 50MG TBEC] 180 tablet 1     Sig: TAKE ONE TABLET BY MOUTH TWICE A DAY AS NEEDED FOR MODERATE PAIN       NSAID Medications Failed - 6/11/2020 10:29 AM        Failed - Normal ALT on file in past 12 months     Recent Labs   Lab Test 10/12/17  2100   ALT 92*             Failed - Normal AST on file in past 12 months     Recent Labs   Lab Test 10/12/17  2100   AST 51*             Passed - Blood pressure under 140/90 in past 12 months     BP Readings from Last 3 Encounters:   01/16/20 124/88   10/15/19 102/70   08/02/19 102/80                 Passed - Recent (12 mo) or future (30 days) visit within the authorizing provider's specialty     Patient has had an office visit with the authorizing provider or a provider within the authorizing providers department within the previous 12 mos or has a future within next 30 days. See \"Patient Info\" tab in inbasket, or \"Choose Columns\" in Meds & Orders section of the refill encounter.              Passed - Patient is age 6-64 years        Passed - Normal CBC on file in past 12 months     Recent Labs   Lab Test 01/16/20  1526   WBC 7.3   RBC 4.78   HGB 14.8   HCT 44.8                    Passed - Medication is active on med list        Passed - Normal serum creatinine on file in past 12 months     Recent Labs   Lab Test 01/16/20  1540   CR 0.86       Ok to refill medication if creatinine is low             Shadia CANDELARIO RN, BSN      "

## 2020-12-17 DIAGNOSIS — G89.29 CHRONIC RIGHT-SIDED THORACIC BACK PAIN: ICD-10-CM

## 2020-12-17 DIAGNOSIS — M54.6 CHRONIC RIGHT-SIDED THORACIC BACK PAIN: ICD-10-CM

## 2020-12-17 DIAGNOSIS — M10.071 ACUTE IDIOPATHIC GOUT OF RIGHT FOOT: ICD-10-CM

## 2020-12-18 NOTE — TELEPHONE ENCOUNTER
"Requested Prescriptions   Pending Prescriptions Disp Refills     diclofenac (VOLTAREN) 50 MG EC tablet 180 tablet 0       NSAID Medications Failed - 12/17/2020  8:44 AM        Failed - Normal ALT on file in past 12 months     Recent Labs   Lab Test 10/12/17  2100   ALT 92*             Failed - Normal AST on file in past 12 months     Recent Labs   Lab Test 10/12/17  2100   AST 51*             Passed - Blood pressure under 140/90 in past 12 months     BP Readings from Last 3 Encounters:   01/16/20 124/88   10/15/19 102/70   08/02/19 102/80                 Passed - Recent (12 mo) or future (30 days) visit within the authorizing provider's specialty     Patient has had an office visit with the authorizing provider or a provider within the authorizing providers department within the previous 12 mos or has a future within next 30 days. See \"Patient Info\" tab in inbasket, or \"Choose Columns\" in Meds & Orders section of the refill encounter.              Passed - Patient is age 6-64 years        Passed - Normal CBC on file in past 12 months     Recent Labs   Lab Test 01/16/20  1526   WBC 7.3   RBC 4.78   HGB 14.8   HCT 44.8                    Passed - Medication is active on med list        Passed - Normal serum creatinine on file in past 12 months     Recent Labs   Lab Test 01/16/20  1540   CR 0.86       Ok to refill medication if creatinine is low               "

## 2021-02-22 ENCOUNTER — OFFICE VISIT (OUTPATIENT)
Dept: FAMILY MEDICINE | Facility: CLINIC | Age: 61
End: 2021-02-22
Payer: COMMERCIAL

## 2021-02-22 DIAGNOSIS — Z23 NEED FOR VACCINATION: Primary | ICD-10-CM

## 2021-02-22 PROCEDURE — 99207 PR NO CHARGE NURSE ONLY: CPT

## 2021-02-22 PROCEDURE — 90715 TDAP VACCINE 7 YRS/> IM: CPT

## 2021-02-22 PROCEDURE — 90471 IMMUNIZATION ADMIN: CPT

## 2021-02-22 NOTE — PROGRESS NOTES
Updated Tdap immunization for pt.    Due to injection administration, patient instructed to remain in clinic for 15 minutes  afterwards, and to report any adverse reaction to me immediately.

## 2021-09-09 ENCOUNTER — OFFICE VISIT (OUTPATIENT)
Dept: FAMILY MEDICINE | Facility: CLINIC | Age: 61
End: 2021-09-09
Payer: COMMERCIAL

## 2021-09-09 VITALS
HEART RATE: 60 BPM | HEIGHT: 66 IN | DIASTOLIC BLOOD PRESSURE: 80 MMHG | RESPIRATION RATE: 14 BRPM | BODY MASS INDEX: 24.43 KG/M2 | TEMPERATURE: 97.3 F | WEIGHT: 152 LBS | SYSTOLIC BLOOD PRESSURE: 138 MMHG | OXYGEN SATURATION: 99 %

## 2021-09-09 DIAGNOSIS — S39.012A STRAIN OF LUMBAR REGION, INITIAL ENCOUNTER: ICD-10-CM

## 2021-09-09 DIAGNOSIS — I63.81 THALAMIC INFARCT, ACUTE (H): Primary | ICD-10-CM

## 2021-09-09 DIAGNOSIS — I63.9 OCCIPITAL STROKE (H): ICD-10-CM

## 2021-09-09 PROCEDURE — 99214 OFFICE O/P EST MOD 30 MIN: CPT | Performed by: NURSE PRACTITIONER

## 2021-09-09 ASSESSMENT — ASTHMA QUESTIONNAIRES
QUESTION_3 LAST FOUR WEEKS HOW OFTEN DID YOUR ASTHMA SYMPTOMS (WHEEZING, COUGHING, SHORTNESS OF BREATH, CHEST TIGHTNESS OR PAIN) WAKE YOU UP AT NIGHT OR EARLIER THAN USUAL IN THE MORNING: NOT AT ALL
QUESTION_5 LAST FOUR WEEKS HOW WOULD YOU RATE YOUR ASTHMA CONTROL: WELL CONTROLLED
ACT_TOTALSCORE: 23
QUESTION_2 LAST FOUR WEEKS HOW OFTEN HAVE YOU HAD SHORTNESS OF BREATH: NOT AT ALL
QUESTION_1 LAST FOUR WEEKS HOW MUCH OF THE TIME DID YOUR ASTHMA KEEP YOU FROM GETTING AS MUCH DONE AT WORK, SCHOOL OR AT HOME: NONE OF THE TIME
QUESTION_4 LAST FOUR WEEKS HOW OFTEN HAVE YOU USED YOUR RESCUE INHALER OR NEBULIZER MEDICATION (SUCH AS ALBUTEROL): ONCE A WEEK OR LESS

## 2021-09-09 ASSESSMENT — PAIN SCALES - GENERAL: PAINLEVEL: MILD PAIN (3)

## 2021-09-09 ASSESSMENT — MIFFLIN-ST. JEOR: SCORE: 1437.22

## 2021-09-09 NOTE — PROGRESS NOTES
"    Assessment & Plan     Thalamic infarct, acute (H)  Occipital stroke (H)  Follow up neuropsych evaluation recommended.   - NEUROPSYCHOLOGY REFERRAL  Parking permit form signed.   Strongly encouraged him to take his plavix, atorvastatin, asa.  Will consider    Low back strain  Symptomatic care strategies are reviewed.  Encouraged the use of tylenol ES up to 3500 mg daily vs diclofenac   Topical aspercream, biofreeze,   Ice/heat as needed     Call or return to the clinic with any worsening of symptoms or no resolution. Patient/Parent verbalized understanding and is in agreement. Medication side effects reviewed.   Current Outpatient Medications   Medication Sig Dispense Refill     aspirin (ASA) 325 MG tablet Take 325 mg by mouth daily       HERBALS Take 1 each by mouth daily Fermented [place]       HERBALS Take 1 each by mouth daily Joselin Lerner       Chart documentation with Dragon Voice recognition Software. Although reviewed after completion, some words and grammatical errors may remain.    MYRA Craig CNP  [facility]    Mihir Chatterjee is a 60 year old who presents for the following health issues     HPI     Not taking allopurinol, Atorvastatin, or Plavix    Doesn't feel it is needed. \" Im not going to take that stuff. I am going to just eat right and keep active\"      Chronic right sided thoracic back pain   Medication Followup of Dicolenac       Taking Medication as prescribed: yes    Side Effects:  None    Medication Helping Symptoms:  Yes    Light duty work with fish farming    Can walk for 15 minutes without a problem    Cutting lawn front yard but has to stop to rest before doing the back yard a few days later.     S/p CVA  RIght leg stiffness and spasming in the morning or when he overdoes it.   Still working on cognitive skills with writing.   Reading 2-4 hours a day.  No recent neuropsych evaluation    Recent trip traveling and eyes were burning.     CVA " "over 4 yrs ago.   Off cholesterol and plavix   No recent kidney stone or gout flare off allopurinol.   Taking regular aspirin daily      Would like to handicapped parking permit form completed today          Review of Systems   Constitutional, HEENT, cardiovascular, pulmonary, GI, , musculoskeletal, neuro, skin, endocrine and psych systems are negative, except as otherwise noted.      Objective    /80   Pulse 60   Temp 97.3  F (36.3  C) (Tympanic)   Resp 14   Ht 1.676 m (5' 6\")   Wt 68.9 kg (152 lb)   SpO2 99%   BMI 24.53 kg/m    Body mass index is 24.53 kg/m .  Physical Exam   GENERAL: alert and no distress  EYES: Eyes grossly normal to inspection, PERRL and conjunctivae and sclerae normal  HENT: ear canals and TM's normal, nose and mouth without ulcers or lesions  NECK: no adenopathy, no asymmetry, masses, or scars and thyroid normal to palpation  RESP: lungs clear to auscultation - no rales, rhonchi or wheezes  CV: regular rate and rhythm, normal S1 S2, no S3 or S4, no murmur, click or rub, no peripheral edema and peripheral pulses strong  ABDOMEN: soft, nontender, no hepatosplenomegaly, no masses and bowel sounds normal  MS: no gross musculoskeletal defects noted, no edema  Trigger point tenderness low back right lumbar region with spasming   SKIN: no suspicious lesions or rashes  NEURO: Normal strength and tone, mentation intact and speech normal  PSYCH: mentation appears normal, affect normal/bright              Maria Dolores Krishnamurthy MSN,FNP-BC  [address] [address]  [phone]          "

## 2021-09-09 NOTE — LETTER
My Asthma Action Plan    Name: Sen Watkins   YOB: 1960  Date: 9/9/2021   My doctor: MYRA Craig CNP   My clinic: [facility]        My Rescue Medicine:   Albuterol inhaler (Proair/Ventolin/Proventil HFA)  2-4 puffs EVERY 4 HOURS as needed. Use a spacer if recommended by your provider.   My Asthma Severity:   Intermittent / Exercise Induced  Know your asthma triggers: Patient is unaware of triggers             GREEN ZONE   Good Control    I feel good    No cough or wheeze    Can work, sleep and play without asthma symptoms       Take your asthma control medicine every day.     1. If exercise triggers your asthma, take your rescue medication    15 minutes before exercise or sports, and    During exercise if you have asthma symptoms  2. Spacer to use with inhaler: If you have a spacer, make sure to use it with your inhaler             YELLOW ZONE Getting Worse  I have ANY of these:    I do not feel good    Cough or wheeze    Chest feels tight    Wake up at night   1. Keep taking your Green Zone medications  2. Start taking your rescue medicine:    every 20 minutes for up to 1 hour. Then every 4 hours for 24-48 hours.  3. If you stay in the Yellow Zone for more than 12-24 hours, contact your doctor.  4. If you do not return to the Green Zone in 12-24 hours or you get worse, start taking your oral steroid medicine if prescribed by your provider.           RED ZONE Medical Alert - Get Help  I have ANY of these:    I feel awful    Medicine is not helping    Breathing getting harder    Trouble walking or talking    Nose opens wide to breathe       1. Take your rescue medicine NOW  2. If your provider has prescribed an oral steroid medicine, start taking it NOW  3. Call your doctor NOW  4. If you are still in the Red Zone after 20 minutes and you have not reached your doctor:    Take your rescue medicine again and    Call 911 or go to the emergency room  right away    See your regular doctor within 2 weeks of an Emergency Room or Urgent Care visit for follow-up treatment.          Annual Reminders:  Meet with Asthma Educator,  Flu Shot in the Fall, consider Pneumonia Vaccination for patients with asthma (aged 19 and older).    Pharmacy:    [id number] - [place], [place] - [address] [facility] [place] [address]    Electronically signed by MYRA Craig CNP   Date: 09/09/21                    Asthma Triggers  How To Control Things That Make Your Asthma Worse    Triggers are things that make your asthma worse.  Look at the list below to help you find your triggers and   what you can do about them. You can help prevent asthma flare-ups by staying away from your triggers.      Trigger                                                          What you can do   Cigarette Smoke  Tobacco smoke can make asthma worse. Do not allow smoking in your home, car or around you.  Be sure no one smokes at a child s day care or school.  If you smoke, ask your health care provider for ways to help you quit.  Ask family members to quit too.  Ask your health care provider for a referral to Quit Plan to help you quit smoking, or call 8-669-287-PLAN.     Colds, Flu, Bronchitis  These are common triggers of asthma. Wash your hands often.  Don t touch your eyes, nose or mouth.  Get a flu shot every year.     Dust Mites  These are tiny bugs that live in cloth or carpet. They are too small to see. Wash sheets and blankets in hot water every week.   Encase pillows and mattress in dust mite proof covers.  Avoid having carpet if you can. If you have carpet, vacuum weekly.   Use a dust mask and HEPA vacuum.   Pollen and Outdoor Mold  Some people are allergic to trees, grass, or weed pollen, or molds. Try to keep your windows closed.  Limit time out doors when pollen count is high.   Ask you health care provider about taking medicine  during allergy season.     Animal Dander  Some people are allergic to skin flakes, urine or saliva from pets with fur or feathers. Keep pets with fur or feathers out of your home.    If you can t keep the pet outdoors, then keep the pet out of your bedroom.  Keep the bedroom door closed.  Keep pets off cloth furniture and away from stuffed toys.     Mice, Rats, and Cockroaches  Some people are allergic to the waste from these pests.   Cover food and garbage.  Clean up spills and food crumbs.  Store grease in the refrigerator.   Keep food out of the bedroom.   Indoor Mold  This can be a trigger if your home has high moisture. Fix leaking faucets, pipes, or other sources of water.   Clean moldy surfaces.  Dehumidify basement if it is damp and smelly.   Smoke, Strong Odors, and Sprays  These can reduce air quality. Stay away from strong odors and sprays, such as perfume, powder, hair spray, paints, smoke incense, paint, cleaning products, candles and new carpet.   Exercise or Sports  Some people with asthma have this trigger. Be active!  Ask your doctor about taking medicine before sports or exercise to prevent symptoms.    Warm up for 5-10 minutes before and after sports or exercise.     Other Triggers of Asthma  Cold air:  Cover your nose and mouth with a scarf.  Sometimes laughing or crying can be a trigger.  Some medicines and food can trigger asthma.

## 2021-09-10 ASSESSMENT — ASTHMA QUESTIONNAIRES: ACT_TOTALSCORE: 23

## 2021-10-13 ENCOUNTER — TELEPHONE (OUTPATIENT)
Dept: FAMILY MEDICINE | Facility: CLINIC | Age: 61
End: 2021-10-13

## 2021-10-13 DIAGNOSIS — M1A.09X0 IDIOPATHIC CHRONIC GOUT OF MULTIPLE SITES WITHOUT TOPHUS: ICD-10-CM

## 2021-10-13 RX ORDER — ALLOPURINOL 100 MG/1
TABLET ORAL
Qty: 90 TABLET | Refills: 0 | OUTPATIENT
Start: 2021-10-13

## 2021-10-13 NOTE — TELEPHONE ENCOUNTER
"Requested Prescriptions   Pending Prescriptions Disp Refills     allopurinol (ZYLOPRIM) 100 MG tablet [Pharmacy Med Name: ALLOPURINOL 100MG TABS] 90 tablet 0     Sig: TAKE ONE TABLET BY MOUTH ONCE DAILY       Gout Agents Protocol Failed - 10/13/2021 10:35 AM        Failed - CBC on file in past 12 months     Recent Labs   Lab Test 01/16/20  1526   WBC 7.3   RBC 4.78   HGB 14.8   HCT 44.8                    Failed - ALT on file in past 12 months     Recent Labs   Lab Test 10/12/17  2100   ALT 92*             Failed - Has Uric Acid on file in past 12 months and value is less than 6     Recent Labs   Lab Test 01/16/20  1526   URIC 3.2*     If level is 6mg/dL or greater, ok to refill one time and refer to provider.           Failed - Medication is active on med list        Failed - Normal serum creatinine on file in the past 12 months     Recent Labs   Lab Test 01/16/20  1540   CR 0.86       Ok to refill medication if creatinine is low          Passed - Recent (12 mo) or future (30 days) visit within the authorizing provider's specialty     Patient has had an office visit with the authorizing provider or a provider within the authorizing providers department within the previous 12 mos or has a future within next 30 days. See \"Patient Info\" tab in inbasket, or \"Choose Columns\" in Meds & Orders section of the refill encounter.              Passed - Patient is age 18 or older             "

## 2021-10-19 RX ORDER — ALLOPURINOL 100 MG/1
100 TABLET ORAL DAILY
Qty: 90 TABLET | Refills: 0 | Status: SHIPPED | OUTPATIENT
Start: 2021-10-19 | End: 2022-04-27

## 2021-10-19 NOTE — TELEPHONE ENCOUNTER
Patient called and says he needs a alternative medication that is similar to the allopurinol sent it was working really good for his gout but med has been discontinued. Please update patient.        Anali Oneal, OLY Moran

## 2021-10-19 NOTE — TELEPHONE ENCOUNTER
Pt informed/ advised as noted per Maria Dolores.  States has been taking Rx all along.  Lab appt scheduled 10-21-21.  ASHA Márquez RN

## 2021-10-19 NOTE — TELEPHONE ENCOUNTER
Refilled #90 today   According to his last not he had not been taking this so it was discontinued   Would like him to have his uric acid level checked yearly if he is going to continue to take this.  Thanks Maria Dolores JEFFERSON-BC

## 2021-10-22 ENCOUNTER — LAB (OUTPATIENT)
Dept: LAB | Facility: CLINIC | Age: 61
End: 2021-10-22
Payer: COMMERCIAL

## 2021-10-22 DIAGNOSIS — M1A.09X0 IDIOPATHIC CHRONIC GOUT OF MULTIPLE SITES WITHOUT TOPHUS: ICD-10-CM

## 2021-10-22 LAB — URATE SERPL-MCNC: 4.4 MG/DL (ref 3.5–7.2)

## 2021-10-22 PROCEDURE — 36415 COLL VENOUS BLD VENIPUNCTURE: CPT

## 2021-10-22 PROCEDURE — 84550 ASSAY OF BLOOD/URIC ACID: CPT

## 2022-01-18 ENCOUNTER — OFFICE VISIT (OUTPATIENT)
Dept: FAMILY MEDICINE | Facility: CLINIC | Age: 62
End: 2022-01-18
Payer: COMMERCIAL

## 2022-01-18 VITALS
SYSTOLIC BLOOD PRESSURE: 138 MMHG | OXYGEN SATURATION: 99 % | HEART RATE: 73 BPM | WEIGHT: 152 LBS | DIASTOLIC BLOOD PRESSURE: 72 MMHG | RESPIRATION RATE: 14 BRPM | BODY MASS INDEX: 24.53 KG/M2 | TEMPERATURE: 97.3 F

## 2022-01-18 DIAGNOSIS — F43.23 SITUATIONAL MIXED ANXIETY AND DEPRESSIVE DISORDER: ICD-10-CM

## 2022-01-18 DIAGNOSIS — F43.10 PTSD (POST-TRAUMATIC STRESS DISORDER): ICD-10-CM

## 2022-01-18 DIAGNOSIS — L60.0 INGROWING TOENAIL: Primary | ICD-10-CM

## 2022-01-18 DIAGNOSIS — I63.81 THALAMIC INFARCT, ACUTE (H): ICD-10-CM

## 2022-01-18 DIAGNOSIS — I63.9 OCCIPITAL STROKE (H): ICD-10-CM

## 2022-01-18 PROCEDURE — 99214 OFFICE O/P EST MOD 30 MIN: CPT | Performed by: NURSE PRACTITIONER

## 2022-01-18 ASSESSMENT — ANXIETY QUESTIONNAIRES
GAD7 TOTAL SCORE: 6
2. NOT BEING ABLE TO STOP OR CONTROL WORRYING: SEVERAL DAYS
1. FEELING NERVOUS, ANXIOUS, OR ON EDGE: SEVERAL DAYS
3. WORRYING TOO MUCH ABOUT DIFFERENT THINGS: NOT AT ALL
7. FEELING AFRAID AS IF SOMETHING AWFUL MIGHT HAPPEN: NOT AT ALL
5. BEING SO RESTLESS THAT IT IS HARD TO SIT STILL: MORE THAN HALF THE DAYS
IF YOU CHECKED OFF ANY PROBLEMS ON THIS QUESTIONNAIRE, HOW DIFFICULT HAVE THESE PROBLEMS MADE IT FOR YOU TO DO YOUR WORK, TAKE CARE OF THINGS AT HOME, OR GET ALONG WITH OTHER PEOPLE: NOT DIFFICULT AT ALL
6. BECOMING EASILY ANNOYED OR IRRITABLE: SEVERAL DAYS

## 2022-01-18 ASSESSMENT — PATIENT HEALTH QUESTIONNAIRE - PHQ9
5. POOR APPETITE OR OVEREATING: SEVERAL DAYS
SUM OF ALL RESPONSES TO PHQ QUESTIONS 1-9: 11

## 2022-01-18 ASSESSMENT — PAIN SCALES - GENERAL: PAINLEVEL: NO PAIN (0)

## 2022-01-18 NOTE — PROGRESS NOTES
Assessment & Plan     Ingrowing toenail  See podiatry for removal as needed  - Orthopedic  Referral    PTSD (post-traumatic stress disorder)  Chronic and ongoing with recent exacerbation  - Adult Mental Health Referral    Situational mixed anxiety and depressive disorder  Chronic and ongoing with recent exacerbation.  Medication management declined.  Therapy referral generated  - Adult Mental Health Referral    Thalamic infarct, acute (H)  Occipital stroke (H)    With recent exacerbation of his headaches  Recommend yearly follow-up with neurology as previously directed  Formal referral generated  - Adult Neurology Referral        Depression Screening Follow Up-psychotherapy recommended for follow-up    PHQ 1/18/2022   PHQ-9 Total Score 11   Q9: Thoughts of better off dead/self-harm past 2 weeks Not at all     Call or return to the clinic with any worsening of symptoms or no resolution. Patient/Parent verbalized understanding and is in agreement. Medication side effects reviewed.   Current Outpatient Medications   Medication Sig Dispense Refill     allopurinol (ZYLOPRIM) 100 MG tablet Take 1 tablet (100 mg) by mouth daily 90 tablet 0     aspirin (ASA) 325 MG tablet Take 325 mg by mouth daily       HERBALS Take 1 each by mouth daily Fermented Chongga       HERBALS Take 1 each by mouth daily Joselin Lerner       Chart documentation with Dragon Voice recognition Software. Although reviewed after completion, some words and grammatical errors may remain.    MYRA Craig CNP  [facility]   Sen is a 61 year old who presents for the following health issues     HPI     Abnormal Mood Symptoms  Onset/Duration: ongoing for years   Description: Mental health   Depression (if yes, do PHQ-9): YES  Anxiety (if yes, do LADY-7): YES  Accompanying Signs & Symptoms:  Still participating in activities that you used to enjoy: YES  Fatigue: YES  Irritability: YES  Difficulty  concentrating: YES  Changes in appetite: no  Problems with sleep: no  Heart racing/beating fast: YES  Abnormally elevated, expansive, or irritable mood: YES  Persistently increased activity or energy: no  Thoughts of hurting yourself or others: no  History:  Recent stress or major life event: YES  Prior depression or anxiety: yes     No flowsheet data found.  LADY-7 SCORE 6/7/2012   Total Score 4     Really bad headache this spring on the way back from [place]  hasnt seen neurologist since his stroke 2018  Increase headaches with increased stress  Would like to see a therapist   Taking ASA daily   Allopurinal daily for gout. Feet still giving him some issues.     Wheat intolerance was vomiting bread  Has changed his diet and his nail fungus has gotten better.    Eating .. has to force himself to eat.   Wife has cancer .. more anxiety with this. Depression worse  PTSD as a child.  Flashbacks happening.  Previous psychotherapy helpful  Would like to see an exerienced therapist.   A lot of figiting when he is really stressed.     No COVID vaccine.. declined today  Pneumonia and influenza vaccine declined today         Review of Systems   Constitutional, HEENT, cardiovascular, pulmonary, GI, , musculoskeletal, neuro, skin, endocrine and psych systems are negative, except as otherwise noted.      Objective    /72   Pulse 73   Temp 97.3  F (36.3  C) (Tympanic)   Resp 14   Wt 68.9 kg (152 lb)   SpO2 99%   BMI 24.53 kg/m    Body mass index is 24.53 kg/m .  Physical Exam   GENERAL: healthy, alert and no distress  EYES: Eyes grossly normal to inspection, PERRL and conjunctivae and sclerae normal  HENT: ear canals and TM's normal, nose and mouth without ulcers or lesions  NECK: no adenopathy, no asymmetry, masses, or scars and thyroid normal to palpation  RESP: lungs clear to auscultation - no rales, rhonchi or wheezes  CV: regular rate and rhythm, normal S1 S2, no S3 or S4, no murmur, click or rub, no peripheral  edema and peripheral pulses strong  ABDOMEN: soft, nontender, no hepatosplenomegaly, no masses and bowel sounds normal  MS: no gross musculoskeletal defects noted, no edema  SKIN: no suspicious lesions or rashes ingrown toenails bilaterally great toe  NEURO: Normal strength and tone, mentation intact and speech normal  PSYCH: anxious, judgement and insight intact and appearance well groomed    Lab on 10/22/2021   Component Date Value Ref Range Status     Uric Acid 10/22/2021 4.4  3.5 - 7.2 mg/dL Final

## 2022-01-19 ASSESSMENT — ANXIETY QUESTIONNAIRES: GAD7 TOTAL SCORE: 6

## 2022-01-24 ENCOUNTER — OFFICE VISIT (OUTPATIENT)
Dept: PODIATRY | Facility: CLINIC | Age: 62
End: 2022-01-24
Attending: NURSE PRACTITIONER
Payer: COMMERCIAL

## 2022-01-24 VITALS
HEART RATE: 65 BPM | HEIGHT: 66 IN | SYSTOLIC BLOOD PRESSURE: 118 MMHG | WEIGHT: 152 LBS | BODY MASS INDEX: 24.43 KG/M2 | DIASTOLIC BLOOD PRESSURE: 75 MMHG

## 2022-01-24 DIAGNOSIS — L60.0 ONYCHOMYCOSIS WITH INGROWN TOENAIL: Primary | ICD-10-CM

## 2022-01-24 DIAGNOSIS — L60.0 INGROWING TOENAIL: ICD-10-CM

## 2022-01-24 DIAGNOSIS — B35.1 ONYCHOMYCOSIS WITH INGROWN TOENAIL: Primary | ICD-10-CM

## 2022-01-24 PROCEDURE — 11720 DEBRIDE NAIL 1-5: CPT | Performed by: PODIATRIST

## 2022-01-24 PROCEDURE — 99203 OFFICE O/P NEW LOW 30 MIN: CPT | Mod: 25 | Performed by: PODIATRIST

## 2022-01-24 ASSESSMENT — MIFFLIN-ST. JEOR: SCORE: 1432.22

## 2022-01-24 NOTE — PATIENT INSTRUCTIONS
[facility] [employer]                    Clare Pressley RN                                [phone]                                                Serving [place] and [facility]  In Home Foot Care    [facility] Skye Nina [phone] [email or web address]  [place]/ [place]/ [place] on [address]     [facility] Sandra Vanegas         RN, CFCN, [facility] [phone] [email or web address] [place]:  [place], [facility], [place], [place]     [facility] Krystina Ward         RN, CWOCN, [facility] [phone] [email or web address]  Clinic: [facility]     Home visits: [facility]     [facility] Nasrin HOBBS, RN, [facility] [phone] [email or web address] Clinic:  [facility]  Home visits:  [facility]      [facility] Shana Sarkar RN, [facility] [phone] [email or web address] [facility]     [facility] Luzma Hough RN ,BSN,[facility] [phone]    [email or web address] [place], [facility], [place], [place], [place], [place], [place]     Jaci Hinton   [phone]  [email or web address]  Clinic: [facility]  Home visits: [place] / [facility]       Any discomfort should be treated with either Ibuprofen (Advil) or Tylenol.  Please follow package instructions on amount to be taken.      Flu vaccines are now available at all [facility] clinics and retail pharmacies across the [facility]. Appointments are required for clinic locations. To schedule an appointment online, please log into [employer] or create an account if you are a new user. You can also call [phone], or simply walk in at one of the [facility] retail pharmacy locations.      [employer] - Login Page

## 2022-01-24 NOTE — LETTER
"    1/24/2022         [place]: Sen Watkins  [address]  [facility] [place] [address]        Dear Colleague,    Thank you for referring your patient, Sen Watkins, to the [facility] [place]. Please see a copy of my visit note below.    PATIENT HISTORY:  Sen Watkins is a 61 year old male who presents to clinic in consultation at the request of  Maria Dolores Krishnamurthy C.N.P. with a chief complaint of painful ingrown toenails.  The patient is seen by themselves.  The patient relates the pain is primarily located around the great toe toenails on both feet.  Reports insidious onset without acute precipitating event. that has been going on for several month(s).  The patient has previously tried trimming them on his own with little success.  Any previous notes and studies that pertain to the patient's condition were reviewed.    Pertinent medical, surgical and family history was reviewed in Epic chart and include acute ischemic stroke, idiopathic gout right foot, osteoarthritis right foot    Medications:   Current Outpatient Medications:      allopurinol (ZYLOPRIM) 100 MG tablet, Take 1 tablet (100 mg) by mouth daily, Disp: 90 tablet, Rfl: 0     aspirin (ASA) 325 MG tablet, Take 325 mg by mouth daily, Disp: , Rfl:      HERBALS, Take 1 each by mouth daily Fermented Chongga, Disp: , Rfl:      HERBALS, Take 1 each by mouth daily Joselin Lerner, Disp: , Rfl:      Allergies:    Allergies   Allergen Reactions     Bees      Codeine Nausea and Vomiting     Tylenol [Acetaminophen] GI Disturbance     vomitting        Vitals: /75   Pulse 65   Ht 1.676 m (5' 6\")   Wt 68.9 kg (152 lb)   BMI 24.53 kg/m    BMI= Body mass index is 24.53 kg/m .    LOWER EXTREMITY PHYSICAL EXAM    Dermatologic: Noted hypertrophic discolored elongated toenails to the right and left great toes.  No surrounding erythema or edema noted.  No drainage noted.  Otherwise the skin is intact to right and left lower " extremity without significant lesions, rash or abrasion.        Vascular: DP & PT pulses are intact & regular on the right and left.   CFT and skin temperature is normal to the right and left lower extremity.     Neurologic: Lower extremity sensation is intact to light touch.  No evidence of weakness in the right and left lower extremity.        Musculoskeletal: Patient is ambulatory without assistive device or brace.  No gross ankle deformity noted.  No foot or ankle joint effusion is noted.            ASSESSMENT / PLAN:     ICD-10-CM    1. Onychomycosis with ingrown toenail  B35.1     L60.0    2. Ingrowing toenail  L60.0 Orthopedic  Referral       I have explained to Sen about the conditions.  We discussed the underlying contributing factors to the condition as well as treatment options along with expected length of recovery.  At this time, the nails of the right and left great toe were sharply debrided with a nail nipper down to normal thickness and length.  The patient was advised if symptoms persist or redness, swelling or drainage is noted that he have the nails removed.    Sen verbalized agreement with and understanding of the rational for the diagnosis and treatment plan.  All questions were answered to best of my ability and the patient's satisfaction. The patient was advised to contact the clinic with any questions that may arise after the clinic visit.      Disclaimer: This note consists of symbols derived from keyboarding, dictation and/or voice recognition software. As a result, there may be errors in the script that have gone undetected. Please consider this when interpreting information found in this chart.       DARRELL Bae.P.M., F.A.C.F.A.S.        Again, thank you for allowing me to participate in the care of your patient.        Sincerely,        Nelson Johnson DPM

## 2022-01-24 NOTE — NURSING NOTE
"Chief Complaint   Patient presents with     Ingrown Toenail     BL great toenails       Initial /75   Pulse 65   Ht 1.676 m (5' 6\")   Wt 68.9 kg (152 lb)   BMI 24.53 kg/m   Estimated body mass index is 24.53 kg/m  as calculated from the following:    Height as of this encounter: 1.676 m (5' 6\").    Weight as of this encounter: 68.9 kg (152 lb).  Medications and allergies reviewed.      Kate RAMÍREZ MA    "

## 2022-01-27 NOTE — PROGRESS NOTES
"PATIENT HISTORY:  Sen Watkins is a 61 year old male who presents to clinic in consultation at the request of  Maria Dolores Krishnamurthy C.N.P. with a chief complaint of painful ingrown toenails.  The patient is seen by themselves.  The patient relates the pain is primarily located around the great toe toenails on both feet.  Reports insidious onset without acute precipitating event. that has been going on for several month(s).  The patient has previously tried trimming them on his own with little success.  Any previous notes and studies that pertain to the patient's condition were reviewed.    Pertinent medical, surgical and family history was reviewed in Epic chart and include acute ischemic stroke, idiopathic gout right foot, osteoarthritis right foot    Medications:   Current Outpatient Medications:      allopurinol (ZYLOPRIM) 100 MG tablet, Take 1 tablet (100 mg) by mouth daily, Disp: 90 tablet, Rfl: 0     aspirin (ASA) 325 MG tablet, Take 325 mg by mouth daily, Disp: , Rfl:      HERBALS, Take 1 each by mouth daily Fermented Chongga, Disp: , Rfl:      HERBALS, Take 1 each by mouth daily Joselin Lerner, Disp: , Rfl:      Allergies:    Allergies   Allergen Reactions     Bees      Codeine Nausea and Vomiting     Tylenol [Acetaminophen] GI Disturbance     vomitting        Vitals: /75   Pulse 65   Ht 1.676 m (5' 6\")   Wt 68.9 kg (152 lb)   BMI 24.53 kg/m    BMI= Body mass index is 24.53 kg/m .    LOWER EXTREMITY PHYSICAL EXAM    Dermatologic: Noted hypertrophic discolored elongated toenails to the right and left great toes.  No surrounding erythema or edema noted.  No drainage noted.  Otherwise the skin is intact to right and left lower extremity without significant lesions, rash or abrasion.        Vascular: DP & PT pulses are intact & regular on the right and left.   CFT and skin temperature is normal to the right and left lower extremity.     Neurologic: Lower extremity sensation is intact to light " touch.  No evidence of weakness in the right and left lower extremity.        Musculoskeletal: Patient is ambulatory without assistive device or brace.  No gross ankle deformity noted.  No foot or ankle joint effusion is noted.            ASSESSMENT / PLAN:     ICD-10-CM    1. Onychomycosis with ingrown toenail  B35.1     L60.0    2. Ingrowing toenail  L60.0 Orthopedic  Referral       I have explained to Sen about the conditions.  We discussed the underlying contributing factors to the condition as well as treatment options along with expected length of recovery.  At this time, the nails of the right and left great toe were sharply debrided with a nail nipper down to normal thickness and length.  The patient was advised if symptoms persist or redness, swelling or drainage is noted that he have the nails removed.    Sen verbalized agreement with and understanding of the rational for the diagnosis and treatment plan.  All questions were answered to best of my ability and the patient's satisfaction. The patient was advised to contact the clinic with any questions that may arise after the clinic visit.      Disclaimer: This note consists of symbols derived from keyboarding, dictation and/or voice recognition software. As a result, there may be errors in the script that have gone undetected. Please consider this when interpreting information found in this chart.       EMERSON Johnson D.P.M., F.MICHAEL.C.F.A.S.

## 2022-04-26 DIAGNOSIS — M1A.09X0 IDIOPATHIC CHRONIC GOUT OF MULTIPLE SITES WITHOUT TOPHUS: ICD-10-CM

## 2022-04-27 RX ORDER — ALLOPURINOL 100 MG/1
100 TABLET ORAL DAILY
Qty: 90 TABLET | Refills: 1 | Status: SHIPPED | OUTPATIENT
Start: 2022-04-27 | End: 2023-05-15

## 2023-03-21 DIAGNOSIS — M54.6 CHRONIC RIGHT-SIDED THORACIC BACK PAIN: ICD-10-CM

## 2023-03-21 DIAGNOSIS — M10.071 ACUTE IDIOPATHIC GOUT OF RIGHT FOOT: ICD-10-CM

## 2023-03-21 DIAGNOSIS — G89.29 CHRONIC RIGHT-SIDED THORACIC BACK PAIN: ICD-10-CM

## 2023-03-21 NOTE — TELEPHONE ENCOUNTER
Medication Question or Refill        What medication are you calling about (include dose and sig)?: Diclofenac    Preferred Pharmacy:     [place], [place] - [address]  [address] [address]  Phone: [phone] Fax: [phone] Alternate Fax: [phone], [phone]      Controlled Substance Agreement on file:   CSA -- Patient Level:    CSA: None found at the patient level.       Who prescribed the medication?: GREY Krishnamurthy    Do you need a refill? Yes    When did you use the medication last? As needed    Patient offered an appointment? Yes: 4/18/23    Do you have any questions or concerns?  No      Okay to leave a detailed message?: Yes at Home number on file [phone] (home)

## 2023-03-21 NOTE — TELEPHONE ENCOUNTER
Next 5 appointments (look out 90 days)    Apr 18, 2023  1:30 PM  (Arrive by 1:10 PM)  Adult Preventative Visit with MYRA Craig CNP  [facility] [address] [place] [id number]  [phone]           Routing refill request to provider for review/approval because:  Drug not active on patient's medication list

## 2023-06-21 ENCOUNTER — TELEPHONE (OUTPATIENT)
Dept: FAMILY MEDICINE | Facility: CLINIC | Age: 63
End: 2023-06-21
Payer: COMMERCIAL

## 2023-06-21 NOTE — TELEPHONE ENCOUNTER
Forms/Letter Request    Type of form/letter: Medical Excuse for Jury Duty    Have you been seen for this request: No    Pt is wondering if Maria Dolores can write a letter excusing him from jury duty due to his anxiety and past stroke. Does not thin he can sit through jury duty. Wondering if this is possible.      When is form/letter needed by: as soon as possible, has Jury Duty 7/7    How would you like the form/letter returned:     Patient Notified form requests are processed in 3-5 business days:Yes    Okay to leave a detailed message?: Yes at Home number on file [phone] (home)

## 2023-06-21 NOTE — TELEPHONE ENCOUNTER
Left detailed message on identified voicemail. This would require some type of visit, virtual is appropriate if better for patient. There are virtual openings next on 06/27. Left phone number for patient to call to schedule or speak with care team if needed.   Alejandra CANDELARIO RN

## 2023-06-27 ENCOUNTER — VIRTUAL VISIT (OUTPATIENT)
Dept: FAMILY MEDICINE | Facility: CLINIC | Age: 63
End: 2023-06-27
Payer: COMMERCIAL

## 2023-06-27 DIAGNOSIS — I63.9 OCCIPITAL STROKE (H): ICD-10-CM

## 2023-06-27 DIAGNOSIS — Z12.11 SCREEN FOR COLON CANCER: Primary | ICD-10-CM

## 2023-06-27 DIAGNOSIS — I63.81 THALAMIC INFARCT, ACUTE (H): ICD-10-CM

## 2023-06-27 DIAGNOSIS — R73.03 PREDIABETES: ICD-10-CM

## 2023-06-27 PROCEDURE — 99214 OFFICE O/P EST MOD 30 MIN: CPT | Mod: 95 | Performed by: NURSE PRACTITIONER

## 2023-06-27 ASSESSMENT — ASTHMA QUESTIONNAIRES
QUESTION_1 LAST FOUR WEEKS HOW MUCH OF THE TIME DID YOUR ASTHMA KEEP YOU FROM GETTING AS MUCH DONE AT WORK, SCHOOL OR AT HOME: NONE OF THE TIME
QUESTION_3 LAST FOUR WEEKS HOW OFTEN DID YOUR ASTHMA SYMPTOMS (WHEEZING, COUGHING, SHORTNESS OF BREATH, CHEST TIGHTNESS OR PAIN) WAKE YOU UP AT NIGHT OR EARLIER THAN USUAL IN THE MORNING: NOT AT ALL
QUESTION_4 LAST FOUR WEEKS HOW OFTEN HAVE YOU USED YOUR RESCUE INHALER OR NEBULIZER MEDICATION (SUCH AS ALBUTEROL): NOT AT ALL
ACT_TOTALSCORE: 25
QUESTION_5 LAST FOUR WEEKS HOW WOULD YOU RATE YOUR ASTHMA CONTROL: COMPLETELY CONTROLLED
QUESTION_2 LAST FOUR WEEKS HOW OFTEN HAVE YOU HAD SHORTNESS OF BREATH: NOT AT ALL
ACT_TOTALSCORE: 25

## 2023-06-27 NOTE — PROGRESS NOTES
Sen is a 62 year old who is being evaluated via a billable telephone visit.      What phone number would you like to be contacted at? [phone]  How would you like to obtain your AVS? Mail a copy  Distant Location (provider location):  Off-site    Assessment & Plan     H/O Thalamic infarct, acute (H)  H/O Occipital stroke (H)  - Adult Neurology  Referral  - Lipid panel reflex to direct LDL Fasting  Letter excusing from jury duty written  Follow up with neurology recommended  Referral placed      Screen for colon cancer  Declined     Prediabetes  Labs due   - Hemoglobin A1c    GOUT  Stable continue allopurinol     Patient will stop by clinic later today and  letter for jury duty     Call or return to the clinic with any worsening of symptoms or no resolution. Patient/Parent verbalized understanding and is in agreement. Medication side effects reviewed.   Current Outpatient Medications   Medication Sig Dispense Refill     allopurinol (ZYLOPRIM) 100 MG tablet TAKE 1 TABLET (100 MG) BY MOUTH DAILY 90 tablet 1     aspirin (ASA) 325 MG tablet Take 325 mg by mouth daily       diclofenac (VOLTAREN) 50 MG EC tablet TAKE ONE TABLET BY MOUTH TWICE A DAY AS NEEDED FOR MODERATE PAIN 180 tablet 0     HERBALS Take 1 each by mouth daily Fermented [place]       HERBALS Take 1 each by mouth daily Joselin Lerner       Chart documentation with Dragon Voice recognition Software. Although reviewed after completion, some words and grammatical errors may remain.  As the provider for this telephone/video service, I attest that I introduced myself to the patient, provided my credentials, disclosed my location, and determined that, based on a review of the patient's chart and/or a discussion with members of the patient's treatment team, a telephone/video visit is an appropriate and effective means of providing this service. The patient and I mutually agree that this visit is appropriate for telephone/video visit as  well.      MYRA Craig CNP [facility]    Mihir Chatterjee is a 62 year old, presenting for the following health issues:  Forms        6/27/2023     7:05 AM   Additional Questions   Roomed by Cristel LOPEZ   Accompanied by self     Forms 6/27/2023   Any forms needing to be completed Yes     HPI     Concern - Letter    Needs to re read what he is doing and trouble with comprehension.   Stroke 2016 occipital thalamic infarct stroke   Was called for Jury Duty and feels as if he is unable to do.    Things are going well  Some improvement with writing  Still needing to read things multiple times  Sleep has been unchanged  Has been working on core exercises  Heat is bothersome  Weakness some in the legs stiff and lock up now and then  Upper extremities are better   Memory has been difficult. Short term has been difficult  Always mindful to stay on tasks. Making mistakes and can forget to put water in the coffee pot   Driving has been ok. Feels unsure of himself. Not getting lost when driving  Still working 1-2 times a week for 4-5 hours a day.   Takes a lot out of him but really enjoys his job  Farming fish and gardening work. Funding for [employer]   Putting up a new [employer]   Vision has been stable .. a blur now and then. Sometimes focus is difficult  Light sensitivity remains and occasional headaches both sides on the side of head feels like a band pressure     Has not seen a neurology in the last several years.   Last neuropsych evaluation reviewed 9/13/2016    Declined colon cancer screening colonoscopy and cologuard/FIT testing   Taking gout medication daily  Due for labs. Orders in the system  No recent flare       Review of Systems   Constitutional, HEENT, cardiovascular, pulmonary, GI, , musculoskeletal, neuro, skin, endocrine and psych systems are negative, except as otherwise noted.      Objective           Vitals:  No vitals were obtained today due to  virtual visit.    Physical Exam   healthy, alert and no distress  PSYCH: Alert and oriented times 3; coherent speech, normal   rate and volume, able to articulate logical thoughts, able   to abstract reason, no tangential thoughts, no hallucinations   or delusions  His affect is normal  RESP: No cough, no audible wheezing, able to talk in full sentences  Remainder of exam unable to be completed due to telephone visits    Lab on 10/22/2021   Component Date Value Ref Range Status     Uric Acid 10/22/2021 4.4  3.5 - 7.2 mg/dL Final               Phone call duration: 22 minutes

## 2023-06-27 NOTE — LETTER
June 27, 2023      Sen Watkins  [address]   [facility] [place] [id number]        To Whom It May Concern:    Sen Watkins  was seen on June 27, 2023.  Please excuse him from Jury Duty at this time due to long term deficits associates with a prior stroke.        Sincerely,        MYRA Craig CNP

## 2023-09-13 ENCOUNTER — NURSE TRIAGE (OUTPATIENT)
Dept: FAMILY MEDICINE | Facility: CLINIC | Age: 63
End: 2023-09-13
Payer: COMMERCIAL

## 2023-09-13 NOTE — TELEPHONE ENCOUNTER
Detailed voicemail left with instructions, and patient was instructed to return call to [facility] main line at [phone] to confirm plan/message.     Leticia Bocanegra RN  [facility]

## 2023-09-13 NOTE — TELEPHONE ENCOUNTER
In agreement with previous stroke history ED evaluation recommended  Thanks Maria Dolores Krishnamurthy FNP-BC

## 2023-09-13 NOTE — TELEPHONE ENCOUNTER
Forwarding to PCP for review please.   Difficult triage, as patient seems to have a myriad of symptoms with unclear clinical picture, so RN ultimately advised ER for evaluation, but patient requesting message to PCP.    Patient is reporting new symptoms of headache, abdominal pain with tremor (abdominal) and vomiting, vision problem.   He states symptoms arose last night and lasted about 30-45 minutes.  Then he rested and went to sleep and felt better, but then this morning got up and after getting cleaned up/dressed, he had abdominal tremors again, and vision issue.   Unclear what's going on-GI vs neuro, etc.  He has no new medications or cause for symptoms. He inquired about food poisoning, but doesn't think he ate anything that could cause.  He reports having squash for dinner last night (has never had an issue) with this, and no meats. Currently, he seems to feel fine, except for vision issue-reports flashes and/or floaters. He has history of stroke and reports having an issue with black out vision intermittently since then, but this seems to be new.  He denies other symptoms including weakness, dizziness, or issues with speech.  He reports he was referred to neurology and has an appointment with them, but not until 1/3/24.   Ultimately, due to new, various concerning symptoms, RN advised ER for evaluation, but patient states he won't go today and would like PCP input. He's wondering if neurology referral can be moved up to sooner than January.       Additional Information   Negative: Passed out (i.e., fainted, collapsed and was not responding)   Negative: Shock suspected (e.g., cold/pale/clammy skin, too weak to stand, low BP, rapid pulse)   Negative: Sounds like a life-threatening emergency to the triager   Negative: Followed an abdomen (stomach) injury   Negative: Chest pain   Negative: Pain is mainly in upper abdomen (if needed ask: 'is it mainly above the belly button?')   Negative: Abdomen bloating or  swelling are main symptoms    Answer Assessment - Initial Assessment Questions  See my notes.    Protocols used: Abdominal Pain - Male-A-OH    Leticia Bocanegra RN  [facility]

## 2023-09-14 ENCOUNTER — APPOINTMENT (OUTPATIENT)
Dept: ULTRASOUND IMAGING | Facility: CLINIC | Age: 63
End: 2023-09-14
Attending: EMERGENCY MEDICINE
Payer: COMMERCIAL

## 2023-09-14 ENCOUNTER — APPOINTMENT (OUTPATIENT)
Dept: CT IMAGING | Facility: CLINIC | Age: 63
End: 2023-09-14
Attending: EMERGENCY MEDICINE
Payer: COMMERCIAL

## 2023-09-14 ENCOUNTER — HOSPITAL ENCOUNTER (EMERGENCY)
Facility: CLINIC | Age: 63
Discharge: HOME | End: 2023-09-14
Attending: EMERGENCY MEDICINE | Admitting: EMERGENCY MEDICINE
Payer: COMMERCIAL

## 2023-09-14 VITALS
TEMPERATURE: 98.5 F | WEIGHT: 144.2 LBS | HEART RATE: 79 BPM | OXYGEN SATURATION: 98 % | HEIGHT: 67 IN | SYSTOLIC BLOOD PRESSURE: 135 MMHG | DIASTOLIC BLOOD PRESSURE: 87 MMHG | RESPIRATION RATE: 16 BRPM | BODY MASS INDEX: 22.63 KG/M2

## 2023-09-14 DIAGNOSIS — R10.9 RIGHT-SIDED ABDOMINAL PAIN OF UNKNOWN CAUSE: ICD-10-CM

## 2023-09-14 LAB
ALBUMIN SERPL BCG-MCNC: 4 G/DL (ref 3.5–5.2)
ALBUMIN UR-MCNC: NEGATIVE MG/DL
ALP SERPL-CCNC: 99 U/L (ref 40–129)
ALT SERPL W P-5'-P-CCNC: 16 U/L (ref 0–70)
ANION GAP SERPL CALCULATED.3IONS-SCNC: 9 MMOL/L (ref 7–15)
APPEARANCE UR: CLEAR
AST SERPL W P-5'-P-CCNC: 19 U/L (ref 0–45)
BASOPHILS # BLD AUTO: 0 10E3/UL (ref 0–0.2)
BASOPHILS NFR BLD AUTO: 0 %
BILIRUB SERPL-MCNC: 0.2 MG/DL
BILIRUB UR QL STRIP: NEGATIVE
BUN SERPL-MCNC: 17.7 MG/DL (ref 8–23)
CALCIUM SERPL-MCNC: 9 MG/DL (ref 8.8–10.2)
CHLORIDE SERPL-SCNC: 102 MMOL/L (ref 98–107)
COLOR UR AUTO: YELLOW
CREAT SERPL-MCNC: 0.9 MG/DL (ref 0.67–1.17)
DEPRECATED HCO3 PLAS-SCNC: 29 MMOL/L (ref 22–29)
EGFRCR SERPLBLD CKD-EPI 2021: >90 ML/MIN/1.73M2
EOSINOPHIL # BLD AUTO: 0.2 10E3/UL (ref 0–0.7)
EOSINOPHIL NFR BLD AUTO: 3 %
ERYTHROCYTE [DISTWIDTH] IN BLOOD BY AUTOMATED COUNT: 14.5 % (ref 10–15)
GLUCOSE SERPL-MCNC: 96 MG/DL (ref 70–99)
GLUCOSE UR STRIP-MCNC: NEGATIVE MG/DL
HCT VFR BLD AUTO: 41.1 % (ref 40–53)
HGB BLD-MCNC: 13.6 G/DL (ref 13.3–17.7)
HGB UR QL STRIP: NEGATIVE
IMM GRANULOCYTES # BLD: 0 10E3/UL
IMM GRANULOCYTES NFR BLD: 0 %
KETONES UR STRIP-MCNC: NEGATIVE MG/DL
LEUKOCYTE ESTERASE UR QL STRIP: NEGATIVE
LIPASE SERPL-CCNC: 29 U/L (ref 13–60)
LYMPHOCYTES # BLD AUTO: 1.9 10E3/UL (ref 0.8–5.3)
LYMPHOCYTES NFR BLD AUTO: 25 %
MCH RBC QN AUTO: 31.6 PG (ref 26.5–33)
MCHC RBC AUTO-ENTMCNC: 33.1 G/DL (ref 31.5–36.5)
MCV RBC AUTO: 95 FL (ref 78–100)
MONOCYTES # BLD AUTO: 0.8 10E3/UL (ref 0–1.3)
MONOCYTES NFR BLD AUTO: 10 %
NEUTROPHILS # BLD AUTO: 4.9 10E3/UL (ref 1.6–8.3)
NEUTROPHILS NFR BLD AUTO: 62 %
NITRATE UR QL: NEGATIVE
NRBC # BLD AUTO: 0 10E3/UL
NRBC BLD AUTO-RTO: 0 /100
PH UR STRIP: 7 [PH] (ref 5–7)
PLATELET # BLD AUTO: 246 10E3/UL (ref 150–450)
POTASSIUM SERPL-SCNC: 4 MMOL/L (ref 3.4–5.3)
PROT SERPL-MCNC: 6.7 G/DL (ref 6.4–8.3)
RBC # BLD AUTO: 4.31 10E6/UL (ref 4.4–5.9)
RBC URINE: 1 /HPF
SODIUM SERPL-SCNC: 140 MMOL/L (ref 136–145)
SP GR UR STRIP: 1.01 (ref 1–1.03)
SQUAMOUS EPITHELIAL: <1 /HPF
UROBILINOGEN UR STRIP-MCNC: NORMAL MG/DL
WBC # BLD AUTO: 7.8 10E3/UL (ref 4–11)
WBC URINE: <1 /HPF

## 2023-09-14 PROCEDURE — 81001 URINALYSIS AUTO W/SCOPE: CPT | Performed by: EMERGENCY MEDICINE

## 2023-09-14 PROCEDURE — 96361 HYDRATE IV INFUSION ADD-ON: CPT | Performed by: EMERGENCY MEDICINE

## 2023-09-14 PROCEDURE — 96360 HYDRATION IV INFUSION INIT: CPT | Mod: 59 | Performed by: EMERGENCY MEDICINE

## 2023-09-14 PROCEDURE — 74177 CT ABD & PELVIS W/CONTRAST: CPT

## 2023-09-14 PROCEDURE — 250N000011 HC RX IP 250 OP 636: Performed by: EMERGENCY MEDICINE

## 2023-09-14 PROCEDURE — 258N000003 HC RX IP 258 OP 636: Performed by: EMERGENCY MEDICINE

## 2023-09-14 PROCEDURE — 83690 ASSAY OF LIPASE: CPT | Performed by: EMERGENCY MEDICINE

## 2023-09-14 PROCEDURE — 99285 EMERGENCY DEPT VISIT HI MDM: CPT | Mod: 25 | Performed by: EMERGENCY MEDICINE

## 2023-09-14 PROCEDURE — 250N000009 HC RX 250: Performed by: EMERGENCY MEDICINE

## 2023-09-14 PROCEDURE — 80053 COMPREHEN METABOLIC PANEL: CPT | Performed by: EMERGENCY MEDICINE

## 2023-09-14 PROCEDURE — 99284 EMERGENCY DEPT VISIT MOD MDM: CPT | Performed by: EMERGENCY MEDICINE

## 2023-09-14 PROCEDURE — 85025 COMPLETE CBC W/AUTO DIFF WBC: CPT | Performed by: EMERGENCY MEDICINE

## 2023-09-14 PROCEDURE — 76705 ECHO EXAM OF ABDOMEN: CPT

## 2023-09-14 PROCEDURE — 36415 COLL VENOUS BLD VENIPUNCTURE: CPT | Performed by: EMERGENCY MEDICINE

## 2023-09-14 RX ORDER — IOPAMIDOL 755 MG/ML
70 INJECTION, SOLUTION INTRAVASCULAR ONCE
Status: COMPLETED | OUTPATIENT
Start: 2023-09-14 | End: 2023-09-14

## 2023-09-14 RX ORDER — SODIUM CHLORIDE, SODIUM LACTATE, POTASSIUM CHLORIDE, CALCIUM CHLORIDE 600; 310; 30; 20 MG/100ML; MG/100ML; MG/100ML; MG/100ML
1000 INJECTION, SOLUTION INTRAVENOUS CONTINUOUS
Status: DISCONTINUED | OUTPATIENT
Start: 2023-09-14 | End: 2023-09-14 | Stop reason: HOSPADM

## 2023-09-14 RX ADMIN — SODIUM CHLORIDE 58 ML: 9 INJECTION, SOLUTION INTRAVENOUS at 18:57

## 2023-09-14 RX ADMIN — IOPAMIDOL 70 ML: 755 INJECTION, SOLUTION INTRAVENOUS at 18:56

## 2023-09-14 RX ADMIN — SODIUM CHLORIDE, POTASSIUM CHLORIDE, SODIUM LACTATE AND CALCIUM CHLORIDE 1000 ML: 600; 310; 30; 20 INJECTION, SOLUTION INTRAVENOUS at 17:16

## 2023-09-14 ASSESSMENT — ENCOUNTER SYMPTOMS
ENDOCRINE NEGATIVE: 1
MUSCULOSKELETAL NEGATIVE: 1
HEMATOLOGIC/LYMPHATIC NEGATIVE: 1
NEUROLOGICAL NEGATIVE: 1
CONSTITUTIONAL NEGATIVE: 1
EYES NEGATIVE: 1
RESPIRATORY NEGATIVE: 1
ALLERGIC/IMMUNOLOGIC NEGATIVE: 1
PSYCHIATRIC NEGATIVE: 1
CARDIOVASCULAR NEGATIVE: 1
ABDOMINAL PAIN: 1

## 2023-09-14 ASSESSMENT — ACTIVITIES OF DAILY LIVING (ADL)
ADLS_ACUITY_SCORE: 37
ADLS_ACUITY_SCORE: 37

## 2023-09-14 NOTE — ED PROVIDER NOTES
History     Chief Complaint   Patient presents with    Abdominal Pain     HPI  Senrosalinda Watkins is a 62 year old male who presents for evaluation with report of 1 week history of right-sided abdominal pain about the right upper quadrant right lower quadrant.  Patient noted that he has had some vomiting.    Medical records show prior diagnosis of ischemic stroke, urolithiasis, history of gout, and a history of osteoarthritis and situational anxiety.  Patient's prescribed medications were reviewed.    On examination patient presents with report that he ate a salad earlier this afternoon developed right-sided abdominal pain.  He has had ongoing pain for the last week.  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  No fever or chills and no back pain.  He does have a history of urolithiasis.  Due to ongoing pain and discomfort he presents for further care.  No urinary symptoms.  No back pain or flank pain.      Allergies:  Allergies   Allergen Reactions    Bees     Codeine Nausea and Vomiting    Tylenol [Acetaminophen] GI Disturbance     vomitting        Problem List:    Patient Active Problem List    Diagnosis Date Noted    Osteoarthritis of right foot, unspecified osteoarthritis type 02/02/2017     Priority: Medium    Occipital stroke (H) 07/07/2016     Priority: Medium    Thalamic infarct, acute (H) 07/07/2016     Priority: Medium    Situational anxiety 07/07/2016     Priority: Medium    Screening for prostate cancer 06/13/2016     Priority: Medium     See 5/2013 note for details.  Agrees with USPSTF recommendations against screening.        Acute ischemic stroke (H) 06/02/2016     Priority: Medium     Left thalamic ischemic stroke 6/2/2016.  Workup to identify cause was negative.  Now on 81mg asa daily and statin.    Some numbness in R arm, some coordination issues, mild.        Acute idiopathic gout of right foot 06/02/2016     Priority: Medium      "Patient has been treating with colloidal silver      Advanced directives, counseling/discussion 2016     Priority: Medium     Advance Care Planning 2016: ACP Review of Chart / Resources Provided:  Reviewed chart for advance care plan.  Sen Watkins has no plan or code status on file. Discussed available resources and provided with information. Confirmed code status reflects current choices pending further ACP discussions. Patient declined. Confirmed/documented legally designated decision maker(s). Added by Evelyn Barrera            Intermittent asthma 2011     Priority: Medium     dust, weather changes.  doesn't need/want maintenance inhaler.  long stretches with no use of any inhaler.  albuterol prn.   Came to me with the diagnosis.  Likes \"having albuterol around\", unsure on details on the actual diagnosis.        Nephrolithiasis 2011     Priority: Medium     has had 2 episode of stones.  \"calcium stones\"      CARDIOVASCULAR SCREENING; LDL GOAL LESS THAN 130 10/31/2010     Priority: Medium        Past Medical History:    Past Medical History:   Diagnosis Date    Acute idiopathic gout of right foot 2016    Acute ischemic stroke (H) 2016    Intermittent asthma 2011    Nephrolithiasis 2011    Occipital stroke (H) 2016    Thalamic infarct, acute (H) 2016       Past Surgical History:    No past surgical history on file.    Family History:    Family History   Problem Relation Age of Onset    Coronary Artery Disease Mother     Cerebrovascular Disease Brother     Psychotic Disorder Sister     Psychotic Disorder Brother        Social History:  Marital Status:   [2]  Social History     Tobacco Use    Smoking status: Former     Types: Cigarettes     Quit date: 2012     Years since quittin.3    Smokeless tobacco: Former   Substance Use Topics    Alcohol use: No     Alcohol/week: 0.0 standard drinks of alcohol    Drug use: No        Medications:  " "  allopurinol (ZYLOPRIM) 100 MG tablet  aspirin (ASA) 325 MG tablet  diclofenac (VOLTAREN) 50 MG EC tablet  HERBALS  HERBALS          Review of Systems   Constitutional: Negative.    HENT: Negative.     Eyes: Negative.    Respiratory: Negative.     Cardiovascular: Negative.    Gastrointestinal:  Positive for abdominal pain.   Endocrine: Negative.    Genitourinary: Negative.    Musculoskeletal: Negative.    Skin: Negative.    Allergic/Immunologic: Negative.    Neurological: Negative.    Hematological: Negative.    Psychiatric/Behavioral: Negative.     All other systems reviewed and are negative.      Physical Exam   BP: 117/72  Pulse: 63  Temp: 98.5  F (36.9  C)  Resp: 16  Height: 170.2 cm (5' 7\")  Weight: 65.4 kg (144 lb 3.2 oz)  SpO2: 96 %      Physical Exam  Constitutional:       General: He is not in acute distress.     Appearance: He is not ill-appearing, toxic-appearing or diaphoretic.   HENT:      Head: Normocephalic and atraumatic.   Eyes:      Extraocular Movements: Extraocular movements intact.      Pupils: Pupils are equal, round, and reactive to light.   Cardiovascular:      Rate and Rhythm: Normal rate and regular rhythm.   Pulmonary:      Effort: Pulmonary effort is normal.      Breath sounds: Normal breath sounds.   Abdominal:      Palpations: Abdomen is soft.      Tenderness: There is abdominal tenderness in the right upper quadrant.       Skin:     Capillary Refill: Capillary refill takes less than 2 seconds.      Coloration: Skin is not cyanotic, jaundiced, mottled or pale.      Findings: No erythema or rash.   Neurological:      General: No focal deficit present.      Mental Status: He is alert and oriented to person, place, and time.   Psychiatric:         Mood and Affect: Mood normal. Mood is not anxious or depressed.         Behavior: Behavior normal.         ED Course                 Procedures              Critical Care time:  none             ED medications:  Medications   lactated ringers " "infusion (has no administration in time range)   lactated ringers BOLUS 1,000 mL (0 mLs Intravenous Stopped 9/14/23 2016)   ct saline (58 mLs Intravenous $Given 9/14/23 1857)   iopamidol (ISOVUE-370) solution 70 mL (70 mLs Intravenous $Given 9/14/23 1856)        ED Vitals:  Vitals:    09/14/23 1531 09/14/23 1700 09/14/23 1900 09/14/23 2019   BP: 117/72 136/86 (!) 136/91 135/87   Pulse: 63  79    Resp: 16      Temp: 98.5  F (36.9  C)      TempSrc: Oral      SpO2: 96% 99% 98%    Weight: 65.4 kg (144 lb 3.2 oz)      Height: 1.702 m (5' 7\")         ED labs and imaging:  Results for orders placed or performed during the hospital encounter of 09/14/23   Abdomen US, limited (RUQ only)     Status: None    Narrative    EXAM: US ABDOMEN LIMITED  LOCATION: [facility]  DATE: 9/14/2023    INDICATION: Right-sided abdominal pain.  COMPARISON: 05/04/2017.  TECHNIQUE: Limited abdominal ultrasound.    FINDINGS:    GALLBLADDER: Normal. No gallstones, wall thickening, or pericholecystic fluid. Negative sonographic Fabian's sign.    BILE DUCTS: No biliary dilatation. The common duct measures 4 mm.    LIVER: Normal parenchyma with smooth contour. No focal mass.    RIGHT KIDNEY: No hydronephrosis.    PANCREAS: Obscured by bowel gas.      Impression    IMPRESSION:    Normal right upper quadrant ultrasound.   CT Abdomen Pelvis w Contrast     Status: None    Narrative    EXAM: CT ABDOMEN PELVIS W CONTRAST  LOCATION: [facility]  DATE: 9/14/2023    INDICATION: Right sided abdominal pain x1 week.  Evaluate for acute appendicitis versus other acute intra abdominal process.  Normal right upper quadrant ultrasound  COMPARISON: None.  TECHNIQUE: CT scan of the abdomen and pelvis was performed following injection of IV contrast. Multiplanar reformats were obtained. Dose reduction techniques were used.  CONTRAST: 70 mL Isovue 370    FINDINGS:   LOWER CHEST: Minimal bibasilar atelectasis. No " nodules, masses, infiltrates, or effusions.    HEPATOBILIARY: Normal.    PANCREAS: Normal.    SPLEEN: Normal.    ADRENAL GLANDS: Normal.    KIDNEYS/BLADDER: Kidneys enhance symmetrically and homogenously. No hydronephrosis. No solid mass.  Bladder is unremarkable. No bladder wall thickening or calculus.    BOWEL: Small hiatal hernia. The stomach, duodenum, small bowel, and colon are normal in caliber. Minimal colonic diverticulosis without evidence of diverticulitis. Normal appendix. No CT findings to suggest appendicitis. No inflammatory changes in the   right lower quadrant.    LYMPH NODES: Normal.    VASCULATURE: Aorta is not aneurysmal. No atherosclerotic plaque in the aorta.    PELVIC ORGANS: Prostatic calcifications.    MUSCULOSKELETAL: Degenerative disc disease in the lumbar spine.      Impression    IMPRESSION:   1.  No abnormalities to account for the patient's abdominal pain. Specifically, the appendix is normal.    2.  Clinic diverticulosis without CT findings to suggest diverticulitis.   UA with Microscopic reflex to Culture     Status: Normal    Specimen: Urine, Clean Catch   Result Value Ref Range    Color Urine Yellow Colorless, Straw, Light Yellow, Yellow    Appearance Urine Clear Clear    Glucose Urine Negative Negative mg/dL    Bilirubin Urine Negative Negative    Ketones Urine Negative Negative mg/dL    Specific Gravity Urine 1.010 1.003 - 1.035    Blood Urine Negative Negative    pH Urine 7.0 5.0 - 7.0    Protein Albumin Urine Negative Negative mg/dL    Urobilinogen Urine Normal Normal, 2.0 mg/dL    Nitrite Urine Negative Negative    Leukocyte Esterase Urine Negative Negative    RBC Urine 1 <=2 /HPF    WBC Urine <1 <=5 /HPF    Squamous Epithelials Urine <1 <=1 /HPF    Narrative    Urine Culture not indicated   Comprehensive metabolic panel     Status: Normal   Result Value Ref Range    Sodium 140 136 - 145 mmol/L    Potassium 4.0 3.4 - 5.3 mmol/L    Chloride 102 98 - 107 mmol/L    Carbon Dioxide  (CO2) 29 22 - 29 mmol/L    Anion Gap 9 7 - 15 mmol/L    Urea Nitrogen 17.7 8.0 - 23.0 mg/dL    Creatinine 0.90 0.67 - 1.17 mg/dL    Calcium 9.0 8.8 - 10.2 mg/dL    Glucose 96 70 - 99 mg/dL    Alkaline Phosphatase 99 40 - 129 U/L    AST 19 0 - 45 U/L    ALT 16 0 - 70 U/L    Protein Total 6.7 6.4 - 8.3 g/dL    Albumin 4.0 3.5 - 5.2 g/dL    Bilirubin Total 0.2 <=1.2 mg/dL    GFR Estimate >90 >60 mL/min/1.73m2   Lipase     Status: Normal   Result Value Ref Range    Lipase 29 13 - 60 U/L   CBC with platelets and differential     Status: Abnormal   Result Value Ref Range    WBC Count 7.8 4.0 - 11.0 10e3/uL    RBC Count 4.31 (L) 4.40 - 5.90 10e6/uL    Hemoglobin 13.6 13.3 - 17.7 g/dL    Hematocrit 41.1 40.0 - 53.0 %    MCV 95 78 - 100 fL    MCH 31.6 26.5 - 33.0 pg    MCHC 33.1 31.5 - 36.5 g/dL    RDW 14.5 10.0 - 15.0 %    Platelet Count 246 150 - 450 10e3/uL    % Neutrophils 62 %    % Lymphocytes 25 %    % Monocytes 10 %    % Eosinophils 3 %    % Basophils 0 %    % Immature Granulocytes 0 %    NRBCs per 100 WBC 0 <1 /100    Absolute Neutrophils 4.9 1.6 - 8.3 10e3/uL    Absolute Lymphocytes 1.9 0.8 - 5.3 10e3/uL    Absolute Monocytes 0.8 0.0 - 1.3 10e3/uL    Absolute Eosinophils 0.2 0.0 - 0.7 10e3/uL    Absolute Basophils 0.0 0.0 - 0.2 10e3/uL    Absolute Immature Granulocytes 0.0 <=0.4 10e3/uL    Absolute NRBCs 0.0 10e3/uL   CBC with platelets, differential     Status: Abnormal    Narrative    The following orders were created for panel order CBC with platelets, differential.  Procedure                               Abnormality         Status                     ---------                               -----------         ------                     CBC with platelets and d...[[id number]]  Abnormal            Final result                 Please view results for these tests on the individual orders.            Assessments & Plan (with Medical Decision Making)   Assessment Summary and Clinical Impression: 62-year-old male who  presented with 1 week history of right-sided abdominal pain involving the right upper right lower quadrant.  Pain worsened after eating a salad today.  No prior abdominal surgeries and no trauma.  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  On exam he noted his pain was a 2 out of 10.  No flank or back pain and no urinary or bowel concerns.  No fever no rash.  Stepwise evaluation initiated given advanced age to evaluate for possible causes for right-sided pain.  He was offered medications to manage his discomfort as reported.  Imaging revealed no acute findings to explain right-sided abdominal pain.  He was reassured by his work-up I would be due to have him continue care as an outpatient.    ED course and plan:  Reviewed the medical record.  Reviewed  note from 9/13/2023.  Prior abdominal imaging from 10/12/2017 and 5/4/2017.  See details outlined in the medical record.  Reviewed possible causes for his symptoms and agreed to stepwise evaluation including imaging.  He was offered medications to manage his pain.  Work-up revealed normal hemoglobin normal white count.  Urinalysis does show evidence for microscopic hematuria infection.  Normal liver enzymes and normal lipase.  Normal white count normal hemoglobin.  Comprehensive abdominal ultrasound was normal.  See details in medical record.  With normal ultrasound and distribution of pain CT imaging obtained to assess for appendicitis without acute process that would contribute to patient's pain  CT abdomen and pelvis revealed no evidence for acute process in the abdomen the appendix was visualized and normal diverticulosis was noted but no findings to suggest diverticulitis.  Patient was reevaluated after completion of his imaging and work-up which revealed no acute abnormality.  Discussed that the exact cause of this pain and symptoms reported is not clear.  We agreed to continue care as an outpatient. We reviewed concerning symptoms. He expressed comfort,  understanding and agreement with plan of care.    Disclaimer: This note consists of symbols derived from keyboarding, dictation and/or voice recognition software. As a result, there may be errors in the script that have gone undetected. Please consider this when interpreting information found in this chart.   I have reviewed the nursing notes.    I have reviewed the findings, diagnosis, plan and need for follow up with the patient.           Medical Decision Making  The patient's presentation was of moderate complexity (an acute illness with systemic symptoms).    The patient's evaluation involved:  ordering and/or review of 2 test(s) in this encounter (diagnostic imaging and labs)    The patient's management necessitated moderate risk (prescription drug management including medications given in the ED).        Discharge Medication List as of 9/14/2023  8:16 PM          Final diagnoses:   Right-sided abdominal pain of unknown cause       9/14/2023   [facility] EMERGENCY DEPT       Cheng Farias MD  09/14/23 3364

## 2023-09-14 NOTE — ED NOTES
Pt reports inability to attempt urine sample until he drinks water. He reports drinking 2 bottles of water.

## 2023-09-14 NOTE — ED TRIAGE NOTES
Pt presents with 1 week of RUQ and RLQ intermittent pain/discomfort. Has gallbladder and appendix. Had vomiting 3 nights ago which has since resolved. Does not endorse pain with eating. Also reports diarrhea. No hx of liver disease.      Triage Assessment       Row Name 09/14/23 0622       Triage Assessment (Adult)    Airway WDL WDL       Respiratory WDL    Respiratory WDL WDL       Skin Circulation/Temperature WDL    Skin Circulation/Temperature WDL WDL       Cardiac WDL    Cardiac WDL WDL       Peripheral/Neurovascular WDL    Peripheral Neurovascular WDL WDL       Cognitive/Neuro/Behavioral WDL    Cognitive/Neuro/Behavioral WDL WDL

## 2023-09-15 NOTE — DISCHARGE INSTRUCTIONS
1) Your patient today did not reveal the exact cause of your pain or discomfort.  Imaging did not show any acute or emergency diagnosis.  Your blood work was also reassuring.    2) You appear stable for discharge home at this time however if your symptoms worsen or you develop new concerns you should return to be reevaluated.  We discussed the need to follow-up with your primary care provider about additional concerns with your upcoming follow-up with neurology.

## 2023-10-05 NOTE — TELEPHONE ENCOUNTER
RECORDS RECEIVED FROM: internal   REASON FOR VISIT: Thalamic infarct, acute (H) [I63.81]  Occipital stroke    Date of Appt: 1/3/24   NOTES (FOR ALL VISITS) STATUS DETAILS   OFFICE NOTE from referring provider Internal Maria Dolores Krishnamurthy NP @ [facility]:  6/27/23  1/18/22  9/9/21  1/16/20  10/15/19  8/2/19   OFFICE NOTE from other specialist Internal Dr Magda Goldberg @ [facility]:  11/8/16 8/9/16   DISCHARGE SUMMARY from hospital Internal [facility]:  6/2/16-6/4/16   DISCHARGE REPORT from the ER Internal [facility]:  7/26/19   MEDICATION LIST Internal    IMAGING  (FOR ALL VISITS)     MRI (HEAD, NECK, SPINE) Internal [facility]:  MRI Brain 7/26/19  MRI Brain 6/2/16   CT (HEAD, NECK, SPINE) Internal [facility]:  CTA Head Neck 7/26/19  CT Head 7/26/19  CTA Head 6/2/16  CT Head 6/2/16

## 2023-11-06 ENCOUNTER — TELEPHONE (OUTPATIENT)
Dept: NEUROLOGY | Facility: CLINIC | Age: 63
End: 2023-11-06
Payer: COMMERCIAL

## 2023-11-06 NOTE — TELEPHONE ENCOUNTER
Called waitlist patient and offered an appointment with Dr. Garland on this date 11/8/2023 & time 9:00am at this location [facility]    Please note there is no guarantee this appointment will be available as it is first come first serve.    GLO, no MyChart.     Colin Renee on 11/6/2023 at 1:02 PM

## 2023-11-28 ENCOUNTER — TELEPHONE (OUTPATIENT)
Dept: NEUROLOGY | Facility: CLINIC | Age: 63
End: 2023-11-28
Payer: COMMERCIAL

## 2023-11-28 NOTE — TELEPHONE ENCOUNTER
LVM, no MyC for pt to sched for sooner appt with Dr. Felder on 12/5/23 at 12:00pm if appointment is still avail.    Would need to cancel then the appt with Dr. Dinh, if new appt is accepted.    11/28/23 BD

## 2023-12-07 ENCOUNTER — TELEPHONE (OUTPATIENT)
Dept: NEUROLOGY | Facility: CLINIC | Age: 63
End: 2023-12-07
Payer: COMMERCIAL

## 2023-12-07 NOTE — TELEPHONE ENCOUNTER
LVM, pt is currently scheduled as a NEW Stroke with Dr. Dinh, we can offer 12/12/23 at 1:00 pm with Dr. Phillips for virtual if appt is still available.    Please check providers schedule before santiago the appt.    12/7/23 BD

## 2023-12-21 ENCOUNTER — TELEPHONE (OUTPATIENT)
Dept: FAMILY MEDICINE | Facility: CLINIC | Age: 63
End: 2023-12-21
Payer: COMMERCIAL

## 2023-12-21 NOTE — TELEPHONE ENCOUNTER
"Pt calls and states he doesn't feel well. Pt was talking very quietly and was very hard to hear. Reports sore throat, congestion, unknown fever x 3 days . Asked pt if he has tested self for covid. Pt states does not believe in that.   Pt denies sob/cp. No clinic appt avail today. Suggested pt go to . Pt then stated in an urgent and panicked voice that he doesn't feel good. Asked  pt what doesn't feel good and he stated \"my tummy\". Advised pt perhaps he should call 911 if he is feeling very unwell. Pt states  he can drive himself and does not need 911.   Pt states will go to [place] ed/ and then stated mouth was too dry to continue talking and hung up.       Rashard Mcmillan RN    "

## 2023-12-27 ENCOUNTER — ANCILLARY PROCEDURE (OUTPATIENT)
Dept: GENERAL RADIOLOGY | Facility: CLINIC | Age: 63
End: 2023-12-27
Attending: NURSE PRACTITIONER
Payer: COMMERCIAL

## 2023-12-27 ENCOUNTER — OFFICE VISIT (OUTPATIENT)
Dept: URGENT CARE | Facility: URGENT CARE | Age: 63
End: 2023-12-27
Payer: COMMERCIAL

## 2023-12-27 VITALS
SYSTOLIC BLOOD PRESSURE: 148 MMHG | OXYGEN SATURATION: 96 % | DIASTOLIC BLOOD PRESSURE: 82 MMHG | RESPIRATION RATE: 16 BRPM | TEMPERATURE: 100.9 F | BODY MASS INDEX: 21.93 KG/M2 | WEIGHT: 140 LBS | HEART RATE: 85 BPM

## 2023-12-27 DIAGNOSIS — R05.1 ACUTE COUGH: ICD-10-CM

## 2023-12-27 DIAGNOSIS — J45.20 MILD INTERMITTENT ASTHMA WITHOUT COMPLICATION: ICD-10-CM

## 2023-12-27 DIAGNOSIS — R50.9 FEVER, UNSPECIFIED: ICD-10-CM

## 2023-12-27 DIAGNOSIS — J18.9 PNEUMONIA OF LEFT LOWER LOBE DUE TO INFECTIOUS ORGANISM: Primary | ICD-10-CM

## 2023-12-27 DIAGNOSIS — R06.02 SOB (SHORTNESS OF BREATH): ICD-10-CM

## 2023-12-27 LAB
DEPRECATED S PYO AG THROAT QL EIA: NEGATIVE
FLUAV AG SPEC QL IA: NEGATIVE
FLUBV AG SPEC QL IA: NEGATIVE
GROUP A STREP BY PCR: NOT DETECTED

## 2023-12-27 PROCEDURE — 99213 OFFICE O/P EST LOW 20 MIN: CPT | Performed by: NURSE PRACTITIONER

## 2023-12-27 PROCEDURE — 71046 X-RAY EXAM CHEST 2 VIEWS: CPT | Mod: TC | Performed by: RADIOLOGY

## 2023-12-27 PROCEDURE — 87651 STREP A DNA AMP PROBE: CPT | Performed by: NURSE PRACTITIONER

## 2023-12-27 PROCEDURE — 87804 INFLUENZA ASSAY W/OPTIC: CPT | Performed by: NURSE PRACTITIONER

## 2023-12-27 RX ORDER — AZITHROMYCIN 250 MG/1
TABLET, FILM COATED ORAL
Qty: 6 TABLET | Refills: 0 | Status: SHIPPED | OUTPATIENT
Start: 2023-12-27 | End: 2024-01-01

## 2023-12-27 NOTE — PROGRESS NOTES
Assessment & Plan     1. Pneumonia of left lower lobe due to infectious organism  Rest, Push fluids, vaporizer, oral antibiotic as directed with food without any GI upset.  Ibuprofen and or Tylenol for any fever or body aches.  If symptoms worsen, recheck immediately otherwise follow up with your PCP in 1 week if symptoms are not improving.  Worrisome symptoms discussed with instructions to go to the ED.  Patient verbalized understanding and agreed with this plan.    - azithromycin (ZITHROMAX) 250 MG tablet; Take 2 tablets (500 mg) by mouth daily for 1 day, THEN 1 tablet (250 mg) daily for 4 days.  Dispense: 6 tablet; Refill: 0    2. Acute cough  Per radiology read see below  - XR Chest 2 Views    3. SOB (shortness of breath)    - XR Chest 2 Views    4. Fever, unspecified    - Influenza A & B Antigen - Clinic Collect  - Streptococcus A Rapid Screen w/Reflex to PCR - Clinic Collect  - Group A Streptococcus PCR Throat Swab  - XR Chest 2 Views    5. Mild intermittent asthma without complication  Stable with mild exacerbation  - XR Chest 2 Views    MYRA Levy [facility]     Sen is a 63 year old male who presents to clinic today for the following health issues:  Chief Complaint   Patient presents with    URI     X 1.5 week, fever, chills, running nose, cough, congestion,white mucus     HPI    History of asthma states has had to use inhaler albuterol more frequently than normal  URI Adult    Onset of symptoms was 2 week(s) ago.  Course of illness is waxing and waning.    Severity moderate  Current and Associated symptoms: fever, chills, sweats, runny nose, cough - productive, wheezing, shortness of breath, headache, and fatigue  Treatment measures tried include Tylenol/Ibuprofen, Inhaler (name: albuterol), Fluids, and Rest.  Predisposing factors include None.      Review of Systems  Constitutional, HEENT, cardiovascular, pulmonary, gi and gu systems are  negative, except as otherwise noted.      Objective    BP (!) 148/82   Pulse 85   Temp (!) 100.9  F (38.3  C) (Tympanic)   Resp 16   Wt 63.5 kg (140 lb)   SpO2 96%   BMI 21.93 kg/m    Physical Exam   GENERAL: alert and no distress  EYES: Eyes grossly normal to inspection, PERRL and conjunctivae and sclerae normal  HENT: normal cephalic/atraumatic, ear canals and TM's normal, nose and mouth without ulcers or lesions, oropharynx clear, and oral mucous membranes moist  NECK: bilateral anterior cervical adenopathy, no asymmetry, masses, or scars, and thyroid normal to palpation  RESP: rhonchi bibasilar  CV: regular rate and rhythm, normal S1 S2, no S3 or S4, no murmur, click or rub, no peripheral edema and peripheral pulses strong  ABDOMEN: soft, nontender, no hepatosplenomegaly, no masses and bowel sounds normal  MS: no gross musculoskeletal defects noted, no edema  SKIN: no suspicious lesions or rashes    Results for orders placed or performed in visit on 12/27/23   XR Chest 2 Views     Status: None (Preliminary result)    Narrative    CHEST 2 VIEWS   12/27/2023 3:07 PM     HISTORY: Fever, unspecified; Mild intermittent asthma without  complication; Acute cough; SOB (shortness of breath).    COMPARISON: 6/2/2016.      Impression    IMPRESSION: New patchy opacity at the medial left lung base may be  developing airspace disease including pneumonia. Normal cardiac  silhouette.   Influenza A & B Antigen - Clinic Collect     Status: Normal    Specimen: Nose; Swab   Result Value Ref Range    Influenza A antigen Negative Negative    Influenza B antigen Negative Negative    Narrative    Test results must be correlated with clinical data. If necessary, results should be confirmed by a molecular assay or viral culture.   Streptococcus A Rapid Screen w/Reflex to PCR - Clinic Collect     Status: Normal    Specimen: Throat; Swab   Result Value Ref Range    Group A Strep antigen Negative Negative

## 2024-01-02 ENCOUNTER — TELEPHONE (OUTPATIENT)
Dept: FAMILY MEDICINE | Facility: CLINIC | Age: 64
End: 2024-01-02
Payer: COMMERCIAL

## 2024-01-02 NOTE — TELEPHONE ENCOUNTER
Sen states that his lungs feel a lot better but his ears are still painful and very plugged and cannot hear very well. He states he finished his Zithromax yesterday. He states there is drainage after a shower but not otherwise and no blood. He is not sure about a fever but feels warm at times. He is also a little dizzy with this. He is asking for an antibiotic for his ears as he does not want to sit in  for another 5 hours and states Luz told him she would deal with his ears after the pneumonia. Thank you!    Roas M Joe RN

## 2024-01-03 ENCOUNTER — PRE VISIT (OUTPATIENT)
Dept: NEUROLOGY | Facility: CLINIC | Age: 64
End: 2024-01-03

## 2024-01-03 ENCOUNTER — OFFICE VISIT (OUTPATIENT)
Dept: FAMILY MEDICINE | Facility: CLINIC | Age: 64
End: 2024-01-03
Payer: COMMERCIAL

## 2024-01-03 VITALS
DIASTOLIC BLOOD PRESSURE: 78 MMHG | SYSTOLIC BLOOD PRESSURE: 134 MMHG | RESPIRATION RATE: 18 BRPM | TEMPERATURE: 98.6 F | HEIGHT: 67 IN | OXYGEN SATURATION: 98 % | WEIGHT: 140 LBS | BODY MASS INDEX: 21.97 KG/M2 | HEART RATE: 90 BPM

## 2024-01-03 DIAGNOSIS — H65.06 RECURRENT ACUTE SEROUS OTITIS MEDIA OF BOTH EARS: Primary | ICD-10-CM

## 2024-01-03 PROCEDURE — 99213 OFFICE O/P EST LOW 20 MIN: CPT | Performed by: INTERNAL MEDICINE

## 2024-01-03 RX ORDER — FLUTICASONE PROPIONATE 50 MCG
1 SPRAY, SUSPENSION (ML) NASAL DAILY
Qty: 16 G | Refills: 1 | Status: SHIPPED | OUTPATIENT
Start: 2024-01-03

## 2024-01-03 RX ORDER — RESPIRATORY SYNCYTIAL VIRUS VACCINE 120MCG/0.5
0.5 KIT INTRAMUSCULAR ONCE
Qty: 1 EACH | Refills: 0 | Status: CANCELLED | OUTPATIENT
Start: 2024-01-03 | End: 2024-01-03

## 2024-01-03 ASSESSMENT — ASTHMA QUESTIONNAIRES
QUESTION_3 LAST FOUR WEEKS HOW OFTEN DID YOUR ASTHMA SYMPTOMS (WHEEZING, COUGHING, SHORTNESS OF BREATH, CHEST TIGHTNESS OR PAIN) WAKE YOU UP AT NIGHT OR EARLIER THAN USUAL IN THE MORNING: NOT AT ALL
ACT_TOTALSCORE: 24
QUESTION_1 LAST FOUR WEEKS HOW MUCH OF THE TIME DID YOUR ASTHMA KEEP YOU FROM GETTING AS MUCH DONE AT WORK, SCHOOL OR AT HOME: NONE OF THE TIME
QUESTION_4 LAST FOUR WEEKS HOW OFTEN HAVE YOU USED YOUR RESCUE INHALER OR NEBULIZER MEDICATION (SUCH AS ALBUTEROL): ONCE A WEEK OR LESS
QUESTION_5 LAST FOUR WEEKS HOW WOULD YOU RATE YOUR ASTHMA CONTROL: COMPLETELY CONTROLLED
ACT_TOTALSCORE: 24
QUESTION_2 LAST FOUR WEEKS HOW OFTEN HAVE YOU HAD SHORTNESS OF BREATH: NOT AT ALL

## 2024-01-03 NOTE — TELEPHONE ENCOUNTER
Patient calling back today. Called yesterday.   Requesting antibiotic or question if he needs an appointment for ear problem. States he has muffled hearing and full feeling in head draining clear fluid. States he had a fever yesterday, not measured, but does have chills. Wearing hats and scarves around his head and sleeping 3 hours daily since pneumonia infection and finished antibiotic (z-pack). Denies shortness of breath at rest, but is winded by activity. Denies chest pain.   Denies dizziness. States he is pushing fluids and resting.  Does not wish to discuss Covid.   Requesting prescription to [facility] pharmacy.   Please call back either way:  [phone].       Alejandra Hyman RN on 1/3/2024 at 8:42 AM

## 2024-01-03 NOTE — PROGRESS NOTES
"  Assessment & Plan   Problem List Items Addressed This Visit    None  Visit Diagnoses       Recurrent acute serous otitis media of both ears    -  Primary    Relevant Medications    amoxicillin-clavulanate (AUGMENTIN) 875-125 MG tablet    fluticasone (FLONASE) 50 MCG/ACT nasal spray           Serous otitis media of both ears  - Augmentin 875-125 MG twice a day for 10 days to cover start of ear infection  - Use flonase daily to help drain fluid from ears                 Lizandro Meier MD  [facility] JOEY Chatterjee is a 63 year old, presenting for the following health issues:  Ear Problem (Dizziness as well)        1/3/2024     3:37 PM   Additional Questions   Roomed by Asia Chatterjee presents today with bilateral ear pain for about 1 week with dizziness, headache, and chills/sweats. He states the R ear is more uncomfortable than the L and the pain is located near the tragus and ear lobe. His hearing is muffled and did notice some discharge from the ears. Patient denies any bloody in discharge. States he does have pain with chewing but has no history of teeth grinding or clenching. Patient was seen for upper respiratory symptoms on 12/27 and was diagnosed with pneumonia. He took the full dose of azythromycin for 4 days. Sen does smoke marijuana daily but wants to avoid smoking for a month after having pneumonia.       Review of Systems   Constitutional, HEENT, cardiovascular, pulmonary, gi and gu systems are negative, except as otherwise noted.      Objective    /78 (BP Location: Left arm, Patient Position: Chair, Cuff Size: Adult Regular)   Pulse 90   Temp 98.6  F (37  C)   Resp 18   Ht 1.702 m (5' 7\")   Wt 63.5 kg (140 lb)   SpO2 98%   BMI 21.93 kg/m    Body mass index is 21.93 kg/m .  Physical Exam   GENERAL: healthy, alert and no distress  EYES: Eyes grossly normal to inspection, PERRL and conjunctivae and sclerae normal. Denies vision changes  HENT: " normal cephalic/atraumatic, right ear: erythematous and bulging membrane, left ear: erythematous and bulging membrane, nose and mouth without ulcers or lesions, oropharynx clear, and oral mucous membranes moist  NECK: no adenopathy, no asymmetry, masses, or scars and thyroid normal to palpation  RESP: lungs clear to auscultation - no rales, rhonchi or wheezes  CV: regular rate and rhythm, normal S1 S2, no S3 or S4, no murmur, click or rub, no peripheral edema and peripheral pulses strong  ABDOMEN: soft, nontender, no hepatosplenomegaly, no masses and bowel sounds normal  MS: no gross musculoskeletal defects noted, no edema      ----- Services Performed by a MEDICAL STUDENT in Presence of ATTENDING Physician-------

## 2024-01-03 NOTE — TELEPHONE ENCOUNTER
Attempted to leave message for patient unable to contact.  He would need to be seen for follow-up to be prescribed any further medication treatment.    Luz Ram CNP

## 2024-05-07 DIAGNOSIS — M10.071 ACUTE IDIOPATHIC GOUT OF RIGHT FOOT: ICD-10-CM

## 2024-05-07 DIAGNOSIS — M1A.09X0 IDIOPATHIC CHRONIC GOUT OF MULTIPLE SITES WITHOUT TOPHUS: ICD-10-CM

## 2024-05-08 RX ORDER — ALLOPURINOL 100 MG/1
100 TABLET ORAL DAILY
Qty: 90 TABLET | Refills: 0 | Status: SHIPPED | OUTPATIENT
Start: 2024-05-08

## 2024-05-08 NOTE — TELEPHONE ENCOUNTER
Refilled 1 time only, please call patient to schedule for gout med labs, pt also due for preventative end of June    Nikia Murray MSN, RN

## 2024-05-16 ENCOUNTER — TELEPHONE (OUTPATIENT)
Dept: FAMILY MEDICINE | Facility: CLINIC | Age: 64
End: 2024-05-16
Payer: COMMERCIAL

## 2024-05-16 ENCOUNTER — APPOINTMENT (OUTPATIENT)
Dept: CT IMAGING | Facility: CLINIC | Age: 64
End: 2024-05-16
Attending: STUDENT IN AN ORGANIZED HEALTH CARE EDUCATION/TRAINING PROGRAM
Payer: COMMERCIAL

## 2024-05-16 ENCOUNTER — HOSPITAL ENCOUNTER (EMERGENCY)
Facility: CLINIC | Age: 64
Discharge: HOME | End: 2024-05-17
Attending: STUDENT IN AN ORGANIZED HEALTH CARE EDUCATION/TRAINING PROGRAM | Admitting: STUDENT IN AN ORGANIZED HEALTH CARE EDUCATION/TRAINING PROGRAM
Payer: COMMERCIAL

## 2024-05-16 DIAGNOSIS — R44.3 HALLUCINATIONS: ICD-10-CM

## 2024-05-16 PROCEDURE — 99284 EMERGENCY DEPT VISIT MOD MDM: CPT | Mod: 25 | Performed by: STUDENT IN AN ORGANIZED HEALTH CARE EDUCATION/TRAINING PROGRAM

## 2024-05-16 PROCEDURE — 70450 CT HEAD/BRAIN W/O DYE: CPT

## 2024-05-16 PROCEDURE — 99283 EMERGENCY DEPT VISIT LOW MDM: CPT | Performed by: STUDENT IN AN ORGANIZED HEALTH CARE EDUCATION/TRAINING PROGRAM

## 2024-05-16 ASSESSMENT — ACTIVITIES OF DAILY LIVING (ADL): ADLS_ACUITY_SCORE: 37

## 2024-05-16 ASSESSMENT — COLUMBIA-SUICIDE SEVERITY RATING SCALE - C-SSRS
2. HAVE YOU ACTUALLY HAD ANY THOUGHTS OF KILLING YOURSELF IN THE PAST MONTH?: NO
1. IN THE PAST MONTH, HAVE YOU WISHED YOU WERE DEAD OR WISHED YOU COULD GO TO SLEEP AND NOT WAKE UP?: NO
6. HAVE YOU EVER DONE ANYTHING, STARTED TO DO ANYTHING, OR PREPARED TO DO ANYTHING TO END YOUR LIFE?: NO

## 2024-05-16 NOTE — TELEPHONE ENCOUNTER
S-(situation): the spouse called to speak to provider.     B-(background): the spouse reports the patient has history of stroke.      A-(assessment): the spouse reports the patient was not acting normal. The patient had hallucinations of spirits and talking about witchcraft. The spouse reports this is very odd for the patient. The patient was anxious.  The spouse denies any fear of himself or herself.      R-(recommendations): advised spouse to have him evaluated.  She is concerned about a stroke.  The spouse will bring him to the ER for evaluation.    Thank you    Maria Dolores CANTU RN

## 2024-05-17 VITALS
RESPIRATION RATE: 16 BRPM | TEMPERATURE: 98.1 F | DIASTOLIC BLOOD PRESSURE: 74 MMHG | HEART RATE: 62 BPM | HEIGHT: 67 IN | BODY MASS INDEX: 23.53 KG/M2 | OXYGEN SATURATION: 96 % | WEIGHT: 149.91 LBS | SYSTOLIC BLOOD PRESSURE: 101 MMHG

## 2024-05-17 NOTE — ED PROVIDER NOTES
History     Chief Complaint   Patient presents with    Mental Health Evaluation     HPI  Sen Watkins is a 63 year old male who has history of asthma, kidney stones, CVA, gout, who presents to the emergency department with mental health concern.  History is provided by both the patient and his wife.  Patient's wife states that last night she was concerned because the patient was acting abnormal.  She states that he was acting very anxious and talking to her about spirits and she has never heard him do this before.  Patient states that this has been going on for his entire life, he had just never mentioned it to his wife.  He denies having hallucinations.  He states that they are visions and his people are speaking to him.  He states this happens about once per week.  He states that he usually gets a headache prior to this starting.  He currently denies having any symptoms.  Denies a headache.  Denies vision issues.  Denies weakness in his arms or legs.  He denies alcohol use.  He does use marijuana daily in the form of edibles and smoking.  No fever or recent illness.  He denies suicidal or homicidal ideation.  His wife feels safe around him and does not feel like he is a danger to himself or others.  He states that last number, he had an episode where his vision went out.  He states that for about 20 minutes he had complete blackness over both eyes.  He states that he was seen by ophthalmology and had vision testing that was normal.  He has not had any further vision issues since then.  He states he did have a appointment scheduled with neurology but he did not end up making it.  Notably, patient does have a family history of schizophrenia.    Allergies:  Allergies   Allergen Reactions    Bees     Codeine Nausea and Vomiting    Tylenol [Acetaminophen] GI Disturbance     vomitting        Problem List:    Patient Active Problem List    Diagnosis Date Noted    Osteoarthritis of right foot, unspecified  "osteoarthritis type 02/02/2017     Priority: Medium    Occipital stroke (H) 07/07/2016     Priority: Medium    Thalamic infarct, acute (H) 07/07/2016     Priority: Medium    Anxiety 07/07/2016     Priority: Medium    Screening for prostate cancer 06/13/2016     Priority: Medium     See 5/2013 note for details.  Agrees with USPSTF recommendations against screening.        Acute ischemic stroke (H) 06/02/2016     Priority: Medium     Left thalamic ischemic stroke 6/2/2016.  Workup to identify cause was negative.  Now on 81mg asa daily and statin.    Some numbness in R arm, some coordination issues, mild.        Acute idiopathic gout of right foot 06/02/2016     Priority: Medium     Patient has been treating with colloidal silver      Advanced directives, counseling/discussion 01/20/2016     Priority: Medium     Advance Care Planning 1/20/2016: ACP Review of Chart / Resources Provided:  Reviewed chart for advance care plan.  Sen Watkins has no plan or code status on file. Discussed available resources and provided with information. Confirmed code status reflects current choices pending further ACP discussions. Patient declined. Confirmed/documented legally designated decision maker(s). Added by Evelyn Barrera            Intermittent asthma 04/25/2011     Priority: Medium     dust, weather changes.  doesn't need/want maintenance inhaler.  long stretches with no use of any inhaler.  albuterol prn.   Came to me with the diagnosis.  Likes \"having albuterol around\", unsure on details on the actual diagnosis.        Nephrolithiasis 04/25/2011     Priority: Medium     has had 2 episode of stones.  \"calcium stones\"      CARDIOVASCULAR SCREENING; LDL GOAL LESS THAN 130 10/31/2010     Priority: Medium        Past Medical History:    Past Medical History:   Diagnosis Date    Acute idiopathic gout of right foot 6/2/2016    Acute ischemic stroke (H) 6/2/2016    Intermittent asthma 4/25/2011    Nephrolithiasis 4/25/2011    " "Occipital stroke (H) 2016    Thalamic infarct, acute (H) 2016       Past Surgical History:    No past surgical history on file.    Family History:    Family History   Problem Relation Age of Onset    Coronary Artery Disease Mother     Cerebrovascular Disease Brother     Psychotic Disorder Sister     Psychotic Disorder Brother        Social History:  Marital Status:   [2]  Social History     Tobacco Use    Smoking status: Former     Current packs/day: 0.00     Types: Cigarettes     Quit date: 2012     Years since quittin.0     Passive exposure: Past    Smokeless tobacco: Former   Vaping Use    Vaping status: Never Used   Substance Use Topics    Alcohol use: No     Alcohol/week: 0.0 standard drinks of alcohol    Drug use: No        Medications:    allopurinol (ZYLOPRIM) 100 MG tablet  aspirin (ASA) 325 MG tablet  diclofenac (VOLTAREN) 50 MG EC tablet  fluticasone (FLONASE) 50 MCG/ACT nasal spray  HERBALS  HERBALS          Review of Systems  See HPI  Physical Exam   BP: 138/80  Pulse: 64  Temp: 98.1  F (36.7  C)  Resp: 16  Height: 170.2 cm (5' 7\")  Weight: 68 kg (149 lb 14.6 oz)  SpO2: 99 %      Physical Exam  /74   Pulse 62   Temp 98.1  F (36.7  C) (Oral)   Resp 16   Ht 1.702 m (5' 7\")   Wt 68 kg (149 lb 14.6 oz)   SpO2 96%   BMI 23.48 kg/m    General: alert, interactive, in no apparent distress  Head: atraumatic  Nose: no rhinorrhea or epistaxis  Ears: no external auditory canal discharge or bleeding.    Eyes: Sclera nonicteric. Conjunctiva noninjected. PERRL, EOMI  Mouth: no tonsillar erythema, edema, or exudate.  Moist mucous membranes  Neck: supple, moving spontaneously no midline cervical tenderness  Lungs: No increased work of breathing.  Clear to auscultation bilaterally.  CV: RRR, peripheral pulses palpable and symmetric  Abdomen: soft, nt, nd, no guarding or rebound.   Extremities: Warm and well-perfused.    Skin: no rash or diaphoresis  Neuro: CN II-XII grossly intact, " strength 5/5 in UE and LEs bilaterally, sensation intact to light touch in UE and LEs bilaterally; normal finger-to-nose, clear speech, negative pronator drift  Psych: Denies suicidal or homicidal ideation.  He is calm and cooperative.  Appears to have good insight.  Endorses having visions where he is speaking to his people.     ED Course        Procedures             Critical Care time:  none             Results for orders placed or performed during the hospital encounter of 05/16/24 (from the past 24 hour(s))   CT Head w/o Contrast    Narrative    EXAM: CT HEAD W/O CONTRAST  LOCATION: [facility]  DATE: 5/16/2024    INDICATION: New altered mental status  COMPARISON: Brain MRI 7/26/2019  TECHNIQUE: Routine CT Head without IV contrast. Multiplanar reformats. Dose reduction techniques were used.    FINDINGS:  INTRACRANIAL CONTENTS: No intracranial hemorrhage, extraaxial collection, or mass effect.  No CT evidence of acute infarct. Bilateral chronic cerebellar and right occipital infarcts again noted. Normal parenchymal attenuation otherwise. Normal ventricles   and sulci.     VISUALIZED ORBITS/SINUSES/MASTOIDS: No intraorbital abnormality. No paranasal sinus mucosal disease. No middle ear or mastoid effusion.    BONES/SOFT TISSUES: No acute abnormality.      Impression    IMPRESSION:  1.  No acute intracranial abnormality.    2.  Chronic cerebellar and right occipital infarcts again noted.       Medications - No data to display    Assessments & Plan (with Medical Decision Making)     I have reviewed the nursing notes.    I have reviewed the findings, diagnosis, plan and need for follow up with the patient.          Medical Decision Making  Sen Watkins is a 63 year old male who has history of asthma, kidney stones, CVA, gout, who presents to the emergency department with mental health concern.  Vital signs reviewed and reassuring.  Patient is afebrile.  He has a completely normal  "neurological exam.  He is not suicidal or homicidal.  I do not think he is a danger to himself or others.  Patient did meet with the DEC  and they agree that patient does not appear to be an acute danger to himself or others.  He does not appear to have a psychiatric emergency.  They offered him referrals to psychiatry and he declined.  I do not think he has anything medical going on either.  He had 1 transient episode of vision loss last year and he has had a workup for this.  Has had no further vision issues since then.  He has a normal neurological exam here.  Possible that the patient could have psychosis secondary to marijuana use or possible undiagnosed mental health disorder, but regardless, he does not meet hold criteria and he does not have a psychiatric emergency.  I offered the patient medical workup including blood work and CT scans.  He does not want any blood work.  He was agreeable to get a head CT to \"appease his wife.\"    Head CT obtained and reviewed independently by me and I agree with radiology.  There is no acute hemorrhage or fracture.  Chronic strokes noted.  Patient reassessed and he continues to feel well.  Low suspicion for acute medical etiology he not have any symptoms at this time.  He wants to go home and I think this is reasonable.  As discussed above, patient does not have a psychiatric emergency and does not meet hold criteria.  I have offered him a referral to psychiatry and he declined.  Recommended close outpatient follow-up with ongoing concerns.  Return precautions discussed.  All questions answered.  Patient discharged in stable condition.        New Prescriptions    No medications on file       Final diagnoses:   Hallucinations       5/16/2024   [facility] EMERGENCY DEPT       Matthew David MD  05/17/24 0023    "

## 2024-05-17 NOTE — CONSULTS
"Diagnostic Evaluation Consultation  Crisis Assessment    Patient Name: Sen Watkins  Age:  63 year old  Legal Sex: male  Gender Identity: male  Pronouns:   Race: Choose not to Answer  Ethnicity: Not  or   Language: English      Patient was assessed: Virtual: [employer] Crisis Assessment Start Time: 2231 Crisis Assessment Stop Time: 2300  Patient location: [facility] EMERGENCY DEPT                             ED12    Referral Data and Chief Complaint  Sen Watkins presents to the ED with family/friends. Patient is presenting to the ED for the following concerns: Anxiety.   Factors that make the mental health crisis life threatening or complex are:  Pt presents with wife for concerns with mental health. Pt is Native and reports hallucinating, but nothing atypical to his cultural hx. Wife is concerned that pt has \"something\" going on due to things pt was telling her last night. Ex: infection, stroke, etc due to pt's new revelations that he spoke of last night. Pt denies suicidal and homicidal ideations.  Patient denies any auditory or visual hallucination and states that he receives visions roughly once per week they are not distressing to him.  Patient reports that he has received these visions his entire life.  Patient felt that he was being deceitful towards his wife by not telling her about them so he shared with her last night.  Patient's wife became concerned as patient has never mentioned these visions before and they have been  for 13 years..      Informed Consent and Assessment Methods  Explained the crisis assessment process, including applicable information disclosures and limits to confidentiality, assessed understanding of the process, and obtained consent to proceed with the assessment.  Assessment methods included conducting a formal interview with patient, review of medical records, collaboration with medical staff, and obtaining relevant collateral " information from family and community providers when available.  : done     Patient response to interventions: acceptance expressed  Coping skills were attempted to reduce the crisis:  Spoke with his wife about his visions     History of the Crisis   Patient reports he has a head injury over 40 years ago, no history of inpatient mental health, medication management or therapy.  Patient does not view himself as having any type of mental health concerns.  Patient used these visions as being sent to him.  Patient is not upset or distraught by these visions.  Patient denies any suicidal or homicidal ideations.  Patient does endorse symptoms of anxiety but feels that they are more related to being worried about sharing his thoughts and feelings with his wife    Brief Psychosocial History  Family:  , Children yes (6 kids)  Support System:  Wife  Employment Status:  disabled  Source of Income:  disability  Financial Environmental Concerns:  none  Current Hobbies:  outdoor activities, group/social activities  Barriers in Personal Life:   (denies any barriers)    Significant Clinical History  Current Anxiety Symptoms:  racing thoughts, anxious, panic attack  Current Depression/Trauma:   (Denies)  Current Somatic Symptoms:   (Denies)  Current Psychosis/Thought Disturbance:  auditory hallucinations, visual hallucinations  Current Eating Symptoms:  loss of appetite (Hx of eating disorder,food does not taste good to me.)  Chemical Use History:  Alcohol: None  Benzodiazepines: None  Opiates: None  Cocaine: None  Marijuana: Daily  Other Use: None   Past diagnosis:  Anxiety Disorder, Depression  Family history:  Schizophrenia  Past treatment:  No known formal treatment attempts  Details of most recent treatment:     Other relevant history:          Collateral Information  Is there collateral information: Yes     Collateral information name, relationship, phone number:  Helen Watkins (Spouse)  [phone]    What happened  today: Last night he was not acting normal, he was anxious and he made me sit in the car with him for 30 minutes as he explained these visions. He has never expressed these visions before. Wife called the nurse line and they recommended he come in and be seen as he has a stroke Hx.     What is different about patient's functioning: Patient seems to be acting normal today.     Concern about alcohol/drug use:      What do you think the patient needs:      Has patient made comments about wanting to kill themselves/others: no    If d/c is recommended, can they take part in safety/aftercare planning:  yes    Additional collateral information:        Risk Assessment  [place] Suicide Severity Rating Scale Full Clinical Version:  Suicidal Ideation  Q1 Wish to be Dead (Lifetime): No  Q2 Non-Specific Active Suicidal Thoughts (Lifetime): No  Q6 Suicide Behavior (Lifetime): no     Suicidal Behavior (Lifetime)  Actual Attempt (Lifetime): No  Has subject engaged in non-suicidal self-injurious behavior? (Lifetime): No  Interrupted Attempts (Lifetime): No  Aborted or Self-Interrupted Attempt (Lifetime): No  Preparatory Acts or Behavior (Lifetime): No    [place] Suicide Severity Rating Scale Recent:   Suicidal Ideation (Recent)  Q1 Wished to be Dead (Past Month): no  Q2 Suicidal Thoughts (Past Month): no  Level of Risk per Screen: no risks indicated  Intensity of Ideation (Recent)  Deterrents (Past 1 Month): Does not apply  Reasons for Ideation (Past 1 Month): Does not apply  Suicidal Behavior (Recent)  Actual Attempt (Past 3 Months): No  Total Number of Actual Attempts (Past 3 Months): 0  Has subject engaged in non-suicidal self-injurious behavior? (Past 3 Months): No  Interrupted Attempts (Past 3 Months): No  Total Number of Interrupted Attempts (Past 3 Months): 0  Aborted or Self-Interrupted Attempt (Past 3 Months): No  Total Number of Aborted or Self-Interrupted Attempts (Past 3 Months): 0  Preparatory Acts or Behavior (Past 3  Months): No    Environmental or Psychosocial Events: history of TBI  Protective Factors: Protective Factors: strong bond to family unit, community support, or employment, intact marriage or domestic partnership, responsibilities and duties to others, including pets and children, able to access care without barriers, sense of importance of health and wellness, lives in a responsibly safe and stable environment, cultural, spiritual , or Amish beliefs associated with meaning and value in life, optimistic outlook - identification of future goals    Does the patient have thoughts of harming others? Feels Like Hurting Others: no  Previous Attempt to Hurt Others: no    Is the patient engaging in sexually inappropriate behavior?           Mental Status Exam   Affect: Appropriate  Appearance: Appropriate  Attention Span/Concentration: Attentive  Eye Contact: Intense    Fund of Knowledge: Appropriate   Language /Speech Content: Fluent  Language /Speech Volume: Normal  Language /Speech Rate/Productions: Normal  Recent Memory: Intact  Remote Memory: Intact  Mood: Anxious  Orientation to Person: Yes   Orientation to Place: Yes  Orientation to Time of Day: Yes  Orientation to Date: Yes     Situation (Do they understand why they are here?): Yes  Psychomotor Behavior: Normal  Thought Content: Hallucinations (Vision)  Thought Form: Intact     Mini-Cog Assessment  Number of Words Recalled:    Clock-Drawing Test:     Three Item Recall:    Mini-Cog Total Score:       Medication  Psychotropic medications:   Medication Orders - Psychiatric (From admission, onward)      None             Current Care Team  Patient Care Team:  Maria Dolores Krishnamurthy APRN CNP as PCP - General (Family Practice)  Magda Goldberg MD as Referring Physician (Physical Medicine and Rehabilitation)  Camelia Abernathy PsyD (Psychology)  Maria Dolores Krishnamurthy APRN CNP as Assigned PCP    Diagnosis  Patient Active Problem List   Diagnosis Code     CARDIOVASCULAR SCREENING; LDL GOAL LESS THAN 130 Z13.6    Intermittent asthma J45.20    Nephrolithiasis N20.0    Advanced directives, counseling/discussion Z71.89    Acute ischemic stroke (H) I63.9    Acute idiopathic gout of right foot M10.071    Screening for prostate cancer Z12.5    Occipital stroke (H) I63.9    Thalamic infarct, acute (H) I63.81    Anxiety F41.9    Osteoarthritis of right foot, unspecified osteoarthritis type M19.071       Primary Problem This Admission  Active Hospital Problems    *Anxiety        Clinical Summary and Substantiation of Recommendations   Patient is not a danger to himself or others. Patient views these images that he is receiving as a part of his culture. Patient is not distressed by these images and feels that they are helpful in his daily life. Patient is being medically checked out to ensure that he is medically cleared. Patient does not want any mental health services. Patient alert and oriented able to have a fruitful conversation about his visions. Patient does appear anxious during the assessment. Patient will discharge once medically cleared.         Patient coping skills attempted to reduce the crisis:  Spoke with his wife about his visions    Disposition  Recommended disposition: Individual Therapy        Reviewed case and recommendations with attending provider. Attending Name: Dr. David       Attending concurs with disposition: yes       Patient and/or validated legal guardian concurs with disposition:           Final disposition:  discharge    Legal status on admission: Voluntary/Patient has signed consent for treatment    Assessment Details   Total duration spent with the patient: 31 min     CPT code(s) utilized: 76034 - Psychotherapy for Crisis - 60 (30-74*) min    LISS Marie, Psychotherapist  [facility] - Triage & Transition Services  Callback: [phone]

## 2024-05-17 NOTE — ED TRIAGE NOTES
"Pt presents with wife for concerns with mental health. Pt is Native and reports hallucinating, but nothing atypical to his cultural hx. Wife is concerned that pt has \"something\" going on due to things pt was telling her last night. Ex: infection, stroke, etc due to pt's new revelations that he spoke of last night. Pt denies suicidal and homicidal ideations.      Triage Assessment (Adult)       Row Name 05/16/24 1267          Triage Assessment    Airway WDL WDL        Respiratory WDL    Respiratory WDL WDL        Skin Circulation/Temperature WDL    Skin Circulation/Temperature WDL WDL        Cardiac WDL    Cardiac WDL WDL        Peripheral/Neurovascular WDL    Peripheral Neurovascular WDL WDL        Cognitive/Neuro/Behavioral WDL    Cognitive/Neuro/Behavioral WDL WDL                     "

## 2024-05-17 NOTE — DISCHARGE INSTRUCTIONS
I do not think anything dangerous medically is going on.  Your CAT scan was reassuring.  Follow-up with your regular doctor with ongoing concerns.  Return with new or worsening symptoms

## 2024-11-19 DIAGNOSIS — M10.071 ACUTE IDIOPATHIC GOUT OF RIGHT FOOT: ICD-10-CM

## 2024-11-19 DIAGNOSIS — M1A.09X0 IDIOPATHIC CHRONIC GOUT OF MULTIPLE SITES WITHOUT TOPHUS: ICD-10-CM

## 2024-11-19 RX ORDER — ALLOPURINOL 100 MG/1
100 TABLET ORAL DAILY
Qty: 90 TABLET | Refills: 0 | Status: SHIPPED | OUTPATIENT
Start: 2024-11-19

## 2024-11-19 NOTE — TELEPHONE ENCOUNTER
Requested Prescriptions   Pending Prescriptions Disp Refills    allopurinol (ZYLOPRIM) 100 MG tablet [Pharmacy Med Name: ALLOPURINOL 100MG TABS] 90 tablet 0     Sig: TAKE ONE TABLET BY MOUTH ONCE DAILY       Gout Agents Protocol Failed - 11/19/2024 10:40 AM        Failed - CBC on file in past 12 months     Recent Labs   Lab Test 09/14/23  1659   WBC 7.8   RBC 4.31*   HGB 13.6   HCT 41.1                    Failed - ALT on file in past 12 months     Recent Labs   Lab Test 09/14/23  1659   ALT 16             Failed - Has Uric Acid on file in past 12 months and value is less than 6     Recent Labs   Lab Test 10/22/21  1214   URIC 4.4     If level is 6mg/dL or greater, ok to refill one time and refer to provider.           Failed - Has GFR on file in past 12 months and most recent value is normal        Passed - Medication is active on med list        Passed - Medication indicated for associated diagnosis     One of the following medications: colchicine is prescribed for one or more of the following conditions:     Amyloidosis   ulcer of mouth   Bechet's syndrome   Pericarditis   Peyronie's disease, psoriasis          Passed - Recent (12 mo) or future (90 days) visit within the authorizing provider's specialty     The patient must have completed an in-person or virtual visit within the past 12 months or has a future visit scheduled within the next 90 days with the authorizing provider s specialty.  Urgent care and e-visits do not qualify as an office visit for this protocol.          Passed - Patient is age 18 or older     Refill protocol is for patient's aged 18 years and older

## 2024-12-16 ENCOUNTER — ANCILLARY PROCEDURE (OUTPATIENT)
Dept: GENERAL RADIOLOGY | Facility: CLINIC | Age: 64
End: 2024-12-16
Attending: FAMILY MEDICINE
Payer: COMMERCIAL

## 2024-12-16 ENCOUNTER — OFFICE VISIT (OUTPATIENT)
Dept: FAMILY MEDICINE | Facility: CLINIC | Age: 64
End: 2024-12-16
Payer: COMMERCIAL

## 2024-12-16 VITALS
SYSTOLIC BLOOD PRESSURE: 118 MMHG | HEIGHT: 67 IN | BODY MASS INDEX: 23.39 KG/M2 | RESPIRATION RATE: 18 BRPM | DIASTOLIC BLOOD PRESSURE: 77 MMHG | TEMPERATURE: 97.2 F | HEART RATE: 64 BPM | OXYGEN SATURATION: 98 % | WEIGHT: 149 LBS

## 2024-12-16 DIAGNOSIS — R25.2 LEG CRAMPING: ICD-10-CM

## 2024-12-16 DIAGNOSIS — M54.6 CHRONIC RIGHT-SIDED THORACIC BACK PAIN: ICD-10-CM

## 2024-12-16 DIAGNOSIS — G89.29 CHRONIC RIGHT-SIDED THORACIC BACK PAIN: ICD-10-CM

## 2024-12-16 DIAGNOSIS — M25.551 HIP PAIN, RIGHT: ICD-10-CM

## 2024-12-16 DIAGNOSIS — M25.551 HIP PAIN, RIGHT: Primary | ICD-10-CM

## 2024-12-16 DIAGNOSIS — M10.071 ACUTE IDIOPATHIC GOUT OF RIGHT FOOT: ICD-10-CM

## 2024-12-16 PROBLEM — F12.20 CANNABIS DEPENDENCE, DAILY USE (H): Status: ACTIVE | Noted: 2024-12-16

## 2024-12-16 LAB
ANION GAP SERPL CALCULATED.3IONS-SCNC: 10 MMOL/L (ref 7–15)
BUN SERPL-MCNC: 14.6 MG/DL (ref 8–23)
CALCIUM SERPL-MCNC: 9.1 MG/DL (ref 8.8–10.4)
CHLORIDE SERPL-SCNC: 106 MMOL/L (ref 98–107)
CREAT SERPL-MCNC: 0.92 MG/DL (ref 0.67–1.17)
EGFRCR SERPLBLD CKD-EPI 2021: >90 ML/MIN/1.73M2
GLUCOSE SERPL-MCNC: 82 MG/DL (ref 70–99)
HCO3 SERPL-SCNC: 28 MMOL/L (ref 22–29)
POTASSIUM SERPL-SCNC: 4.3 MMOL/L (ref 3.4–5.3)
SODIUM SERPL-SCNC: 144 MMOL/L (ref 135–145)

## 2024-12-16 PROCEDURE — 80048 BASIC METABOLIC PNL TOTAL CA: CPT | Performed by: FAMILY MEDICINE

## 2024-12-16 PROCEDURE — 73502 X-RAY EXAM HIP UNI 2-3 VIEWS: CPT | Mod: TC | Performed by: INTERNAL MEDICINE

## 2024-12-16 PROCEDURE — 99214 OFFICE O/P EST MOD 30 MIN: CPT | Performed by: FAMILY MEDICINE

## 2024-12-16 PROCEDURE — 36415 COLL VENOUS BLD VENIPUNCTURE: CPT | Performed by: FAMILY MEDICINE

## 2024-12-16 RX ORDER — ALLOPURINOL 100 MG/1
100 TABLET ORAL DAILY
Qty: 90 TABLET | Refills: 1 | Status: SHIPPED | OUTPATIENT
Start: 2024-12-16

## 2024-12-16 ASSESSMENT — ASTHMA QUESTIONNAIRES
QUESTION_3 LAST FOUR WEEKS HOW OFTEN DID YOUR ASTHMA SYMPTOMS (WHEEZING, COUGHING, SHORTNESS OF BREATH, CHEST TIGHTNESS OR PAIN) WAKE YOU UP AT NIGHT OR EARLIER THAN USUAL IN THE MORNING: NOT AT ALL
QUESTION_4 LAST FOUR WEEKS HOW OFTEN HAVE YOU USED YOUR RESCUE INHALER OR NEBULIZER MEDICATION (SUCH AS ALBUTEROL): NOT AT ALL
QUESTION_1 LAST FOUR WEEKS HOW MUCH OF THE TIME DID YOUR ASTHMA KEEP YOU FROM GETTING AS MUCH DONE AT WORK, SCHOOL OR AT HOME: NONE OF THE TIME
ACT_TOTALSCORE: 25
ACT_TOTALSCORE: 25
QUESTION_2 LAST FOUR WEEKS HOW OFTEN HAVE YOU HAD SHORTNESS OF BREATH: NOT AT ALL
QUESTION_5 LAST FOUR WEEKS HOW WOULD YOU RATE YOUR ASTHMA CONTROL: COMPLETELY CONTROLLED

## 2024-12-16 ASSESSMENT — PAIN SCALES - GENERAL: PAINLEVEL_OUTOF10: MODERATE PAIN (4)

## 2024-12-16 ASSESSMENT — ENCOUNTER SYMPTOMS: LEG PAIN: 1

## 2024-12-16 NOTE — LETTER
December 18, 2024      Sen Watkins     [place] [address]        Dear ,    We are writing to inform you of your test results.    Electrolytes and kidney function are all normal, good to see.       Resulted Orders   Basic metabolic panel  (Ca, Cl, CO2, Creat, Gluc, K, Na, BUN)   Result Value Ref Range    Sodium 144 135 - 145 mmol/L    Potassium 4.3 3.4 - 5.3 mmol/L    Chloride 106 98 - 107 mmol/L    Carbon Dioxide (CO2) 28 22 - 29 mmol/L    Anion Gap 10 7 - 15 mmol/L    Urea Nitrogen 14.6 8.0 - 23.0 mg/dL    Creatinine 0.92 0.67 - 1.17 mg/dL    GFR Estimate >90 >60 mL/min/1.73m2      Comment:      eGFR calculated using 2021 CKD-EPI equation.    Calcium 9.1 8.8 - 10.4 mg/dL      Comment:      Reference intervals for this test were updated on 7/16/2024 to reflect our healthy population more accurately. There may be differences in the flagging of prior results with similar values performed with this method. Those prior results can be interpreted in the context of the updated reference intervals.    Glucose 82 70 - 99 mg/dL       If you have any questions or concerns, please call the clinic at the number listed above.       Sincerely,      Nando Tovar MD

## 2024-12-16 NOTE — PROGRESS NOTES
"S :Sen Watkins is a 64 year old male with some R leg thigh cramping.  Last few days.  oK right now.  No injury.  No fall    No swelling    Describes area over thigh, inner thigh.  \"It's not doing it now\"     Hx gout: fills on allopurinol  Back pain: intermittent, uses dicofenac now and then.  Fills     O:/77   Pulse 64   Temp 97.2  F (36.2  C) (Tympanic)   Resp 18   Ht 1.702 m (5' 7\")   Wt 67.6 kg (149 lb)   SpO2 98%   BMI 23.34 kg/m    Walks with slight limp  Some pain with internal rotation R hip, fine on L  No pain or swelling with palpation or manipulation of the knee  No edema in ankle, no redness    Xray pelvis: ok, no findings of concern.  Some mild djd.    Basic met pending    A: R leg pain, cramping, nos      Back pain ,stable       Hx gout,  stable on allopurinol.      P: vague sx.  Not  sure what's going on.  Time, nsaids.  Make sure K and Ca are fine.    Fluids.    Fills on allopurinol and diclofenac          "

## 2024-12-16 NOTE — LETTER
December 18, 2024      Sen Watkins     [place] [address]        Dear ,    We are writing to inform you of your test results.    Electrolytes and kidney function are all normal, good to see.       Resulted Orders   Basic metabolic panel  (Ca, Cl, CO2, Creat, Gluc, K, Na, BUN)   Result Value Ref Range    Sodium 144 135 - 145 mmol/L    Potassium 4.3 3.4 - 5.3 mmol/L    Chloride 106 98 - 107 mmol/L    Carbon Dioxide (CO2) 28 22 - 29 mmol/L    Anion Gap 10 7 - 15 mmol/L    Urea Nitrogen 14.6 8.0 - 23.0 mg/dL    Creatinine 0.92 0.67 - 1.17 mg/dL    GFR Estimate >90 >60 mL/min/1.73m2      Comment:      eGFR calculated using 2021 CKD-EPI equation.    Calcium 9.1 8.8 - 10.4 mg/dL      Comment:      Reference intervals for this test were updated on 7/16/2024 to reflect our healthy population more accurately. There may be differences in the flagging of prior results with similar values performed with this method. Those prior results can be interpreted in the context of the updated reference intervals.    Glucose 82 70 - 99 mg/dL       If you have any questions or concerns, please call the clinic at the number listed above.       Sincerely,      Nando Tovar MD

## 2025-06-11 ENCOUNTER — TELEPHONE (OUTPATIENT)
Dept: FAMILY MEDICINE | Facility: CLINIC | Age: 65
End: 2025-06-11
Payer: COMMERCIAL

## 2025-06-11 NOTE — TELEPHONE ENCOUNTER
Patient Quality Outreach    Patient is due for the following:   Colon Cancer Screening    Action(s) Taken:   Schedule a office visit for colonoscopy     Type of outreach:    Sent letter     Questions for provider review:    None         Gertrudis Dozier MA  Chart routed to None.

## 2025-06-11 NOTE — LETTER
June 11, 2025    Sen Beeberosalinda Watkins  [address]   [facility] [place] [address]    Hello,     Your care team at [facility]  values your health and well-being. After reviewing your chart, we have identified recommendation(s) to help you better manage your health.    It's time for a follow-up visit to manage your Colon Cancer Screening     If you recently had or are having any of these services soon, please contact the clinic via phone or [place] so that your care team can update your records.    We look forward to seeing you at your upcoming visit.    If you have any questions or concerns, please contact our clinic. Thank you for continuing to trust us with your care.    Sincerely,    Your [facility] Care Team